# Patient Record
Sex: FEMALE | Race: WHITE | NOT HISPANIC OR LATINO | Employment: OTHER | URBAN - METROPOLITAN AREA
[De-identification: names, ages, dates, MRNs, and addresses within clinical notes are randomized per-mention and may not be internally consistent; named-entity substitution may affect disease eponyms.]

---

## 2017-02-06 ENCOUNTER — GENERIC CONVERSION - ENCOUNTER (OUTPATIENT)
Dept: OTHER | Facility: OTHER | Age: 57
End: 2017-02-06

## 2017-03-22 ENCOUNTER — ALLSCRIPTS OFFICE VISIT (OUTPATIENT)
Dept: OTHER | Facility: OTHER | Age: 57
End: 2017-03-22

## 2017-04-05 ENCOUNTER — APPOINTMENT (EMERGENCY)
Dept: RADIOLOGY | Facility: HOSPITAL | Age: 57
End: 2017-04-05
Payer: MEDICARE

## 2017-04-05 ENCOUNTER — HOSPITAL ENCOUNTER (EMERGENCY)
Facility: HOSPITAL | Age: 57
Discharge: HOME | End: 2017-04-05
Attending: EMERGENCY MEDICINE | Admitting: EMERGENCY MEDICINE
Payer: MEDICARE

## 2017-04-05 VITALS
HEART RATE: 92 BPM | WEIGHT: 155 LBS | RESPIRATION RATE: 20 BRPM | BODY MASS INDEX: 28.52 KG/M2 | DIASTOLIC BLOOD PRESSURE: 90 MMHG | SYSTOLIC BLOOD PRESSURE: 128 MMHG | OXYGEN SATURATION: 100 % | HEIGHT: 62 IN | TEMPERATURE: 98.1 F

## 2017-04-05 DIAGNOSIS — R00.2 HEART PALPITATIONS: Primary | ICD-10-CM

## 2017-04-05 LAB
ANION GAP SERPL CALCULATED.3IONS-SCNC: 10 MMOL/L (ref 4–13)
ATRIAL RATE: 103 BPM
BASOPHILS # BLD AUTO: 0.08 THOUSANDS/ΜL (ref 0–0.1)
BASOPHILS NFR BLD AUTO: 1 % (ref 0–1)
BUN SERPL-MCNC: 8 MG/DL (ref 5–25)
CALCIUM SERPL-MCNC: 9.5 MG/DL (ref 8.3–10.1)
CHLORIDE SERPL-SCNC: 101 MMOL/L (ref 100–108)
CO2 SERPL-SCNC: 27 MMOL/L (ref 21–32)
CREAT SERPL-MCNC: 0.92 MG/DL (ref 0.6–1.3)
EOSINOPHIL # BLD AUTO: 0.02 THOUSAND/ΜL (ref 0–0.61)
EOSINOPHIL NFR BLD AUTO: 0 % (ref 0–6)
ERYTHROCYTE [DISTWIDTH] IN BLOOD BY AUTOMATED COUNT: 13.8 % (ref 11.6–15.1)
GFR SERPL CREATININE-BSD FRML MDRD: >60 ML/MIN/1.73SQ M
GLUCOSE SERPL-MCNC: 134 MG/DL (ref 65–140)
HCT VFR BLD AUTO: 46.8 % (ref 34.8–46.1)
HGB BLD-MCNC: 15.5 G/DL (ref 11.5–15.4)
LYMPHOCYTES # BLD AUTO: 2.11 THOUSANDS/ΜL (ref 0.6–4.47)
LYMPHOCYTES NFR BLD AUTO: 28 % (ref 14–44)
MCH RBC QN AUTO: 28 PG (ref 26.8–34.3)
MCHC RBC AUTO-ENTMCNC: 33.1 G/DL (ref 31.4–37.4)
MCV RBC AUTO: 85 FL (ref 82–98)
MONOCYTES # BLD AUTO: 0.36 THOUSAND/ΜL (ref 0.17–1.22)
MONOCYTES NFR BLD AUTO: 5 % (ref 4–12)
NEUTROPHILS # BLD AUTO: 4.98 THOUSANDS/ΜL (ref 1.85–7.62)
NEUTS SEG NFR BLD AUTO: 66 % (ref 43–75)
NRBC BLD AUTO-RTO: 0 /100 WBCS
P AXIS: 54 DEGREES
PLATELET # BLD AUTO: 327 THOUSANDS/UL (ref 149–390)
PMV BLD AUTO: 10.6 FL (ref 8.9–12.7)
POTASSIUM SERPL-SCNC: 3.7 MMOL/L (ref 3.5–5.3)
PR INTERVAL: 136 MS
QRS AXIS: -3 DEGREES
QRSD INTERVAL: 88 MS
QT INTERVAL: 344 MS
QTC INTERVAL: 450 MS
RBC # BLD AUTO: 5.53 MILLION/UL (ref 3.81–5.12)
SODIUM SERPL-SCNC: 138 MMOL/L (ref 136–145)
SPECIMEN SOURCE: NORMAL
T WAVE AXIS: 28 DEGREES
TROPONIN I BLD-MCNC: 0 NG/ML (ref 0–0.08)
TSH SERPL DL<=0.05 MIU/L-ACNC: 1.41 UIU/ML (ref 0.36–3.74)
VENTRICULAR RATE: 103 BPM
WBC # BLD AUTO: 7.57 THOUSAND/UL (ref 4.31–10.16)

## 2017-04-05 PROCEDURE — 71020 HB CHEST X-RAY 2VW FRONTAL&LATL: CPT

## 2017-04-05 PROCEDURE — 84443 ASSAY THYROID STIM HORMONE: CPT | Performed by: NURSE PRACTITIONER

## 2017-04-05 PROCEDURE — 99284 EMERGENCY DEPT VISIT MOD MDM: CPT

## 2017-04-05 PROCEDURE — 80048 BASIC METABOLIC PNL TOTAL CA: CPT | Performed by: NURSE PRACTITIONER

## 2017-04-05 PROCEDURE — 93005 ELECTROCARDIOGRAM TRACING: CPT | Performed by: EMERGENCY MEDICINE

## 2017-04-05 PROCEDURE — 85025 COMPLETE CBC W/AUTO DIFF WBC: CPT | Performed by: NURSE PRACTITIONER

## 2017-04-05 PROCEDURE — 36415 COLL VENOUS BLD VENIPUNCTURE: CPT | Performed by: NURSE PRACTITIONER

## 2017-04-05 PROCEDURE — 84484 ASSAY OF TROPONIN QUANT: CPT

## 2017-04-05 RX ORDER — TRAZODONE HYDROCHLORIDE 150 MG/1
TABLET ORAL
COMMUNITY
End: 2018-12-04

## 2017-04-05 RX ORDER — PANTOPRAZOLE SODIUM 40 MG/1
TABLET, DELAYED RELEASE ORAL
COMMUNITY
Start: 2016-09-07 | End: 2018-12-04 | Stop reason: SDUPTHER

## 2017-04-05 RX ORDER — BIOTIN 1 MG
TABLET ORAL
COMMUNITY
Start: 2016-10-01 | End: 2018-12-04 | Stop reason: SDUPTHER

## 2017-04-05 RX ORDER — LORAZEPAM 0.5 MG/1
0.5 TABLET ORAL ONCE
Status: COMPLETED | OUTPATIENT
Start: 2017-04-05 | End: 2017-04-05

## 2017-04-05 RX ORDER — SERTRALINE HYDROCHLORIDE 100 MG/1
150 TABLET, FILM COATED ORAL 2 TIMES DAILY
COMMUNITY

## 2017-04-05 RX ORDER — LORAZEPAM 2 MG/ML
0.5 INJECTION INTRAMUSCULAR ONCE
Status: DISCONTINUED | OUTPATIENT
Start: 2017-04-05 | End: 2017-04-05

## 2017-04-05 RX ORDER — FOLIC ACID 1 MG/1
TABLET ORAL
COMMUNITY
Start: 2016-10-01 | End: 2017-03-30 | Stop reason: ALTCHOICE

## 2017-04-05 RX ORDER — CLONAZEPAM 1 MG/1
1 TABLET ORAL 4 TIMES DAILY
COMMUNITY

## 2017-04-05 RX ADMIN — LORAZEPAM 0.5 MG: 0.5 TABLET ORAL at 12:07

## 2017-04-18 ENCOUNTER — GENERIC CONVERSION - ENCOUNTER (OUTPATIENT)
Dept: OTHER | Facility: OTHER | Age: 57
End: 2017-04-18

## 2017-10-11 ENCOUNTER — GENERIC CONVERSION - ENCOUNTER (OUTPATIENT)
Dept: OTHER | Facility: OTHER | Age: 57
End: 2017-10-11

## 2018-01-10 NOTE — MISCELLANEOUS
History of Present Illness  TCM Communication [facility]: The patient is being contacted for SINDI appt not required  Hospital records were reviewed  She was hospitalized at [facility]  The date of admission: 10/5/17, date of discharge: 10/10/17  She did not schedule a follow up appointment  Communication performed and completed by      Active Problems    1  Abnormal CAT scan (793 99) (R93 8)   2  Abnormal CT of liver (793 3) (R93 2)   3  Abnormal weight loss (783 21) (R63 4)   4  Anxiety (300 00) (F41 9)   5  Backache (724 5) (M54 9)   6  Bilious vomiting with nausea (787 04) (R11 14)   7  Calculus of gallbladder without cholecystitis without obstruction (574 20) (K80 20)   8  Changing skin lesion (709 9) (L98 9)   9  Chronic obstructive pulmonary disease (496) (J44 9)   10  Chronic sore throat (472 1) (J31 2)   11  Current tobacco use (305 1) (Z72 0)   12  Decreased thyroxine (T4) level (794 5) (R94 6)   13  Dehydration (276 51) (E86 0)   14  Depression (311) (F32 9)   15  Edema (782 3) (R60 9)   16  Elevated serum alkaline phosphatase level (790 5) (R74 8)   17  Esophageal reflux (530 81) (K21 9)   18  Hemorrhoids, external (455 3) (K64 4)   19  Hepatic hemangioma (228 04) (D18 03)   20  History of gallstones (V12 79) (Z87 19)   21  Hypercholesterolemia (272 0) (E78 00)   22  Hyperlipidemia (272 4) (E78 5)   23  Kyphoscoliosis (737 30) (M41 9)   24  Liver mass (573 9) (R16 0)   25  Long term use of drug (V58 69) (Z79 899)   26  Loose stools (787 7) (R19 5)   27  Lung nodule (793 11) (R91 1)   28  Lung nodule seen on imaging study (793 11) (R91 1)   29  Multiple benign nevi (216 9) (D22 9)   30  Nasal congestion (478 19) (R09 81)   31  Need for revaccination (V05 9) (Z23)   32  Neurotic excoriations (698 4) (L98 1)   33  Noncompliance of patient with other medical treatment and regimen (V15 81) (Z91 19)   34  Obsessive compulsive disorder (300 3) (F42 9)   35  Osteoporosis (733 00) (M81 0)   36   Other gastritis without hemorrhage (535 40) (K29 60)   37  Rectal bleeding (569 3) (K62 5)   38  Rhabdomyolysis (728 88) (M62 82)   39  Schizoaffective disorder (295 70) (F25 9)   40  Screening for skin condition (V82 0) (Z13 89)   41  Sepsis (038 9,995 91) (A41 9)   42  Sprain of ankle, right (845 00) (S93 401A)   43  Urinary incontinence (788 30) (R32)    Past Medical History    1  History of Acute upper respiratory infection (465 9) (J06 9)   2  History of Benzodiazepine Abuse (305 40)   3  History of Facial cellulitis (682 0) (L03 211)   4  History of abnormal weight loss (V13 89) (Z87 898)   5  History of nicotine dependence (V15 82) (Z87 891)   6  History of Malaise (780 79) (R53 81)   7  History of Suicidal ideation (V62 84) (R45 851)    Surgical History    1  Denied: History of Recent Surgery    Family History  Mother    1  Family history of Diabetes Mellitus (V18 0)   2  Family history of Family Health Status Of Mother -    3  Family history of Stroke Syndrome (V17 1)  Father    4  Family history of Acute Myocardial Infarction (V17 3)  Brother    5  Family history of Intellectual Disabilities (V18 4)    Social History    · Denied: History of Alcohol Use (History)   · Current Every Day Smoker (305 1)   · Current tobacco use (305 1) (Z72 0)    Current Meds   1  Calcium 600 MG Oral Tablet; TAKE 1 TABLET TWICE DAILY; Therapy: 12YRZ0358 to (Evaluate:2016)  Requested for: 65GXB5317; Last   Rx:2016 Ordered   2  KlonoPIN 1 MG Oral Tablet; 1 tab TID; Therapy: (Yarelis Verduzco) to Recorded   3  Pantoprazole Sodium 40 MG Oral Tablet Delayed Release; TAKE 1 TABLET TWICE DAILY   30 MINUTES BEFORE BREAKFAST AND DINNER; Therapy: 58XZN4426 to (Evaluate:2017)  Requested for: 72DSZ6482; Last   Rx:2017 Ordered   4  Sertraline HCl - 100 MG Oral Tablet; TAKE 1 5 TABLET Daily; Last Rx:28Hzh7662   Ordered   5  Vitamin D 1000 UNIT Oral Tablet; TAKE 1 TABLET DAILY;    Therapy: 61WHI4047 to (Evaluate:2016) Requested for: 00Kgj1736; Last   Rx:[id number] Ordered   6  Vitamin D3 1000 UNIT Oral Capsule; TAKE AS DIRECTED; Therapy: 40DHV0148 to (Evaluate:31Oct2016)  Requested for: 51VYD6907; Last   Rx:01Oct2016 Ordered    Allergies    1  Penicillins   2  SEROquel TABS    Health Management  Health Maintenance   COLONOSCOPY; every 10 years; Last 07GOG1851; Next Due: 10SOZ7030;  Active    Signatures   Electronically signed by : Nae Pearce, ; Oct 11 2017  9:06AM EST                       (Author)    Electronically signed by : Armida Sinclair DO; Oct 19 2017  5:03PM EST                       (Co-author)

## 2018-01-11 NOTE — MISCELLANEOUS
History of Present Illness  TCM Communication [facility]: The patient is being contacted for follow-up after hospitalization and 1st try mess 2nd try  Hospital records were reviewed  She was hospitalized at [facility]  The date of admission: 4/10/17, date of discharge: 4/17/17  Diagnosis: schizo  She was discharged to home  Communication performed and completed by      Active Problems    1  Abnormal CAT scan (793 99) (R93 8)   2  Abnormal CT of liver (793 3) (R93 2)   3  Abnormal weight loss (783 21) (R63 4)   4  Anxiety (300 00) (F41 9)   5  Backache (724 5) (M54 9)   6  Bilious vomiting with nausea (787 04) (R11 14)   7  Calculus of gallbladder without cholecystitis without obstruction (574 20) (K80 20)   8  Changing skin lesion (709 9) (L98 9)   9  Chronic obstructive pulmonary disease (496) (J44 9)   10  Chronic sore throat (472 1) (J31 2)   11  Current tobacco use (305 1) (Z72 0)   12  Decreased thyroxine (T4) level (794 5) (R94 6)   13  Dehydration (276 51) (E86 0)   14  Depression (311) (F32 9)   15  Edema (782 3) (R60 9)   16  Elevated serum alkaline phosphatase level (790 5) (R74 8)   17  Esophageal reflux (530 81) (K21 9)   18  Hemorrhoids, external (455 3) (K64 4)   19  Hepatic hemangioma (228 04) (D18 03)   20  History of gallstones (V12 79) (Z87 19)   21  Hypercholesterolemia (272 0) (E78 00)   22  Hyperlipidemia (272 4) (E78 5)   23  Kyphoscoliosis (737 30) (M41 9)   24  Liver mass (573 9) (R16 0)   25  Long term use of drug (V58 69) (Z79 899)   26  Loose stools (787 7) (R19 5)   27  Lung nodule (793 11) (R91 1)   28  Lung nodule seen on imaging study (793 11) (R91 1)   29  Multiple benign nevi (216 9) (D22 9)   30  Nasal congestion (478 19) (R09 81)   31  Need for revaccination (V05 9) (Z23)   32  Neurotic excoriations (698 4) (L98 1)   33  Noncompliance of patient with other medical treatment and regimen (V15 81) (Z91 19)   34  Obsessive compulsive disorder (300 3) (F42 9)   35   Osteoporosis (733 00) (M81 0)   36  Other gastritis without hemorrhage (535 40) (K29 60)   37  Rectal bleeding (569 3) (K62 5)   38  Rhabdomyolysis (728 88) (M62 82)   39  Schizoaffective disorder (295 70) (F25 9)   40  Screening for skin condition (V82 0) (Z13 89)   41  Sepsis (038 9,995 91) (A41 9)   42  Sprain of ankle, right (845 00) (S93 401A)   43  Urinary incontinence (788 30) (R32)    Past Medical History    1  History of Acute upper respiratory infection (465 9) (J06 9)   2  History of Benzodiazepine Abuse (305 40)   3  History of Facial cellulitis (682 0) (L03 211)   4  History of abnormal weight loss (V13 89) (Z87 898)   5  History of nicotine dependence (V15 82) (Z87 891)   6  History of Malaise (780 79) (R53 81)   7  History of Suicidal ideation (V62 84) (R45 851)    Surgical History    1  Denied: History of Recent Surgery    Family History  Mother    1  Family history of Diabetes Mellitus (V18 0)   2  Family history of Family Health Status Of Mother -    3  Family history of Stroke Syndrome (V17 1)  Father    4  Family history of Acute Myocardial Infarction (V17 3)  Brother    5  Family history of Intellectual Disabilities (V18 4)    Social History    · Denied: History of Alcohol Use (History)   · Current Every Day Smoker (305 1)   · Current tobacco use (305 1) (Z72 0)    Current Meds   1  Calcium 600 MG Oral Tablet; TAKE 1 TABLET TWICE DAILY; Therapy: 87UVN6235 to (Evaluate:2016)  Requested for: 31GNL3526; Last   Rx:2016 Ordered   2  KlonoPIN 1 MG Oral Tablet; 1 tab TID; Therapy: (Charan Cortraymond) to Recorded   3  Pantoprazole Sodium 40 MG Oral Tablet Delayed Release; TAKE 1 TABLET TWICE DAILY   30 MINUTES BEFORE BREAKFAST AND DINNER; Therapy: 59ILO3515 to (Evaluate:2017)  Requested for: 57IPR9542; Last   Rx:2017 Ordered   4  Sertraline HCl - 100 MG Oral Tablet; TAKE 1 5 TABLET Daily; Last Rx:13Nwc0287   Ordered   5  Vitamin D 1000 UNIT Oral Tablet; TAKE 1 TABLET DAILY;    Therapy: 83WOD0855 to (Evaluate:18Jun2016)  Requested for: 42Ptv1145; Last   Rx:11Mup9862 Ordered   6  Vitamin D3 1000 UNIT Oral Capsule; TAKE AS DIRECTED; Therapy: 61HTM3620 to (Evaluate:31Oct2016)  Requested for: 21TVO3616; Last   Rx:01Oct2016 Ordered    Allergies    1  Penicillins   2  SEROquel TABS    Health Management  Health Maintenance   COLONOSCOPY; every 10 years; Last 79LPU9572; Next Due: 59DVN3607;  Active    Signatures   Electronically signed by : Roman Gold, [facility]; May  5 2017  2:04PM EST                       (Author)    Electronically signed by : Dale Nieto DO; May  5 2017  3:33PM EST                       (Co-author)

## 2018-01-13 VITALS
DIASTOLIC BLOOD PRESSURE: 80 MMHG | BODY MASS INDEX: 27.31 KG/M2 | HEIGHT: 64 IN | WEIGHT: 160 LBS | SYSTOLIC BLOOD PRESSURE: 130 MMHG

## 2018-01-14 NOTE — MISCELLANEOUS
Message  I called the patient  Last week she had refused basically all medical treatment  I called her today and she now agrees to see the pulmonologist  She is going to make an appointment for follow-up regarding her pulmonary nodules        Signatures   Electronically signed by : Nella Lane DO; Oct  3 2016  6:06PM EST                       (Author)

## 2018-01-16 NOTE — RESULT NOTES
Verified Results  (1) CBC/PLT/DIFF 15Apr2016 09:42AM Yvonne Johnson   TW Order Number: [id number]    TW Order Number: [id number]     Test Name Result Flag Reference   WBC COUNT 8 01 Thousand/uL  4 31-10 16   RBC COUNT 4 62 Million/uL  3 81-5 12   HEMOGLOBIN 12 9 g/dL  11 5-15 4   HEMATOCRIT 39 5 %  34 8-46  1   MCV 86 fL  82-98   MCH 27 9 pg  26 8-34 3   MCHC 32 7 g/dL  31 4-37 4   RDW 15 8 % H 11 6-15 1   MPV 10 5 fL  8 9-12 7   PLATELET COUNT 293 Thousands/uL  149-390   nRBC AUTOMATED 0 /100 WBCs     NEUTROPHILS RELATIVE PERCENT 59 %  43-75   LYMPHOCYTES RELATIVE PERCENT 31 %  14-44   MONOCYTES RELATIVE PERCENT 7 %  4-12   EOSINOPHILS RELATIVE PERCENT 2 %  0-6   BASOPHILS RELATIVE PERCENT 1 %  0-1   NEUTROPHILS ABSOLUTE COUNT 4 81 Thousands/µL  1 85-7 62   LYMPHOCYTES ABSOLUTE COUNT 2 48 Thousands/µL  0 60-4 47   MONOCYTES ABSOLUTE COUNT 0 52 Thousand/µL  0 17-1 22   EOSINOPHILS ABSOLUTE COUNT 0 13 Thousand/µL  0 00-0 61   BASOPHILS ABSOLUTE COUNT 0 06 Thousands/µL  0 00-0 10

## 2018-02-14 ENCOUNTER — TRANSITIONAL CARE MANAGEMENT (OUTPATIENT)
Dept: INTERNAL MEDICINE CLINIC | Facility: CLINIC | Age: 58
End: 2018-02-14

## 2018-03-07 NOTE — PROGRESS NOTES
History of Present Illness    Revaccination   Vaccine Information: Vaccine(s) Given (names): pneumovax [id number]  Unable to reach by phone  Pt called (attempt 1): [phone] sd  Pt called (attempt 2): [phone] sd  Pt called (attempt 3): [phone] sd  Other Information: patient called back and wants to call back to schedule later [phone] sd  [phone]- no response from patient  Active Problems    1  Abnormal CAT scan (793 99) (R93 8)   2  Abnormal CT of liver (793 3) (R93 2)   3  Abnormal weight loss (783 21) (R63 4)   4  Anxiety (300 00) (F41 9)   5  Backache (724 5) (M54 9)   6  Bilious vomiting with nausea (787 04) (R11 14)   7  Changing skin lesion (709 9) (L98 9)   8  Chronic obstructive pulmonary disease (496) (J44 9)   9  Chronic sore throat (472 1) (J31 2)   10  Current tobacco use (305 1) (Z72 0)   11  Decreased thyroxine (T4) level (794 5) (R94 6)   12  Dehydration (276 51) (E86 0)   13  Depression (311) (F32 9)   14  Edema (782 3) (R60 9)   15  Elevated serum alkaline phosphatase level (790 5) (R74 8)   16  Hemorrhoids, external (455 3) (K64 4)   17  History of gallstones (V12 79) (Z87 19)   18  Hypercholesterolemia (272 0) (E78 00)   19  Hyperlipidemia (272 4) (E78 5)   20  Kyphoscoliosis (737 30) (M41 9)   21  Liver mass (573 9) (R16 0)   22  Long term use of drug (V58 69) (Z79 899)   23  Loose stools (787 7) (R19 5)   24  Lung nodule (793 11) (R91 1)   25  Lung nodule seen on imaging study (793 11) (R91 1)   26  Multiple benign nevi (216 9) (D22 9)   27  Nasal congestion (478 19) (R09 81)   28  Need for revaccination (V05 9) (Z23)   29  Neurotic excoriations (698 4) (L98 1)   30  Noncompliance of patient with other medical treatment and regimen (V15 81) (Z91 19)   31  Obsessive compulsive disorder (300 3) (F42 9)   32  Osteoporosis (733 00) (M81 0)   33  Other gastritis without hemorrhage (535 40) (K29 60)   34  Rectal bleeding (569 3) (K62 5)   35   Rhabdomyolysis (446 88) (M62 82)   36  Schizoaffective disorder (295 70) (F25 9)   37  Screening for skin condition (V82 0) (Z13 89)   38  Sepsis (038 9,995 91) (A41 9)   39  Sprain of ankle, right (845 00) (S93 401A)   40  Urinary incontinence (788 30) (R32)    Immunizations  DTP/DTaP --- Edilia Abdit: 09-Apr-2015   Influenza --- Edilia Tierney: no   PPSV --- Series1: 09-Apr-2015     Current Meds   1  Calcium 600 MG Oral Tablet; TAKE 1 TABLET TWICE DAILY   2  Folic Acid 1 MG Oral Tablet; TAKE 1 TABLET DAILY   3  KlonoPIN 1 MG Oral Tablet; 1 tab TID   4  Pantoprazole Sodium 40 MG Oral Tablet Delayed Release; TAKE 1 TABLET TWICE DAILY   30 MINUTES BEFORE BREAKFAST AND DINNER   5  Sertraline HCl - 100 MG Oral Tablet; TAKE 1 5 TABLET Daily   6  Vitamin D 1000 UNIT Oral Tablet; TAKE 1 TABLET DAILY   7  Vitamin D3 1000 UNIT Oral Capsule; TAKE AS DIRECTED    Allergies    1  Penicillins   2  SEROquel TABS    Plan    1   RVAC-Pneumovax 23 25 MCG/0 5ML Injection Injectable    Future Appointments    Date/Time Provider Specialty Site   08/25/2017 09:15 AM Malik Au DO Internal Medicine [facility]     Signatures   Electronically signed by : Kaz Mancini DO; Aug 24 2017  8:00PM EST                       (Author)

## 2018-03-23 ENCOUNTER — TRANSITIONAL CARE MANAGEMENT (OUTPATIENT)
Dept: INTERNAL MEDICINE CLINIC | Facility: CLINIC | Age: 58
End: 2018-03-23

## 2018-04-04 ENCOUNTER — TRANSITIONAL CARE MANAGEMENT (OUTPATIENT)
Dept: INTERNAL MEDICINE CLINIC | Facility: CLINIC | Age: 58
End: 2018-04-04

## 2018-04-17 ENCOUNTER — APPOINTMENT (OUTPATIENT)
Dept: LAB | Facility: CLINIC | Age: 58
End: 2018-04-17
Payer: MEDICARE

## 2018-04-17 ENCOUNTER — TRANSCRIBE ORDERS (OUTPATIENT)
Dept: LAB | Facility: CLINIC | Age: 58
End: 2018-04-17

## 2018-04-17 ENCOUNTER — TELEPHONE (OUTPATIENT)
Dept: INTERNAL MEDICINE CLINIC | Facility: CLINIC | Age: 58
End: 2018-04-17

## 2018-04-17 DIAGNOSIS — Z13.21 ENCOUNTER FOR SCREENING FOR NUTRITIONAL DISORDER: Primary | ICD-10-CM

## 2018-04-17 DIAGNOSIS — Z13.21 ENCOUNTER FOR SCREENING FOR NUTRITIONAL DISORDER: ICD-10-CM

## 2018-04-17 LAB
25(OH)D3 SERPL-MCNC: 16.6 NG/ML (ref 30–100)
ALBUMIN SERPL BCP-MCNC: 3.3 G/DL (ref 3.5–5)
ALP SERPL-CCNC: 124 U/L (ref 46–116)
ALT SERPL W P-5'-P-CCNC: 9 U/L (ref 12–78)
ANION GAP SERPL CALCULATED.3IONS-SCNC: 6 MMOL/L (ref 4–13)
AST SERPL W P-5'-P-CCNC: 13 U/L (ref 5–45)
BASOPHILS # BLD AUTO: 0.04 THOUSANDS/ΜL (ref 0–0.1)
BASOPHILS NFR BLD AUTO: 0 % (ref 0–1)
BILIRUB SERPL-MCNC: 0.22 MG/DL (ref 0.2–1)
BUN SERPL-MCNC: 15 MG/DL (ref 5–25)
CALCIUM SERPL-MCNC: 9.3 MG/DL (ref 8.3–10.1)
CHLORIDE SERPL-SCNC: 110 MMOL/L (ref 100–108)
CHOLEST SERPL-MCNC: 167 MG/DL (ref 50–200)
CO2 SERPL-SCNC: 26 MMOL/L (ref 21–32)
CREAT SERPL-MCNC: 0.83 MG/DL (ref 0.6–1.3)
EOSINOPHIL # BLD AUTO: 0.25 THOUSAND/ΜL (ref 0–0.61)
EOSINOPHIL NFR BLD AUTO: 3 % (ref 0–6)
ERYTHROCYTE [DISTWIDTH] IN BLOOD BY AUTOMATED COUNT: 14.6 % (ref 11.6–15.1)
GFR SERPL CREATININE-BSD FRML MDRD: 78 ML/MIN/1.73SQ M
GLUCOSE P FAST SERPL-MCNC: 79 MG/DL (ref 65–99)
HCT VFR BLD AUTO: 36.9 % (ref 34.8–46.1)
HDLC SERPL-MCNC: 32 MG/DL (ref 40–60)
HGB BLD-MCNC: 12.1 G/DL (ref 11.5–15.4)
LDLC SERPL CALC-MCNC: 115 MG/DL (ref 0–100)
LYMPHOCYTES # BLD AUTO: 2.62 THOUSANDS/ΜL (ref 0.6–4.47)
LYMPHOCYTES NFR BLD AUTO: 29 % (ref 14–44)
MCH RBC QN AUTO: 29 PG (ref 26.8–34.3)
MCHC RBC AUTO-ENTMCNC: 32.8 G/DL (ref 31.4–37.4)
MCV RBC AUTO: 89 FL (ref 82–98)
MONOCYTES # BLD AUTO: 0.58 THOUSAND/ΜL (ref 0.17–1.22)
MONOCYTES NFR BLD AUTO: 6 % (ref 4–12)
NEUTROPHILS # BLD AUTO: 5.58 THOUSANDS/ΜL (ref 1.85–7.62)
NEUTS SEG NFR BLD AUTO: 62 % (ref 43–75)
NONHDLC SERPL-MCNC: 135 MG/DL
NRBC BLD AUTO-RTO: 0 /100 WBCS
PLATELET # BLD AUTO: 425 THOUSANDS/UL (ref 149–390)
PMV BLD AUTO: 10 FL (ref 8.9–12.7)
POTASSIUM SERPL-SCNC: 4.5 MMOL/L (ref 3.5–5.3)
PROT SERPL-MCNC: 7.7 G/DL (ref 6.4–8.2)
RBC # BLD AUTO: 4.17 MILLION/UL (ref 3.81–5.12)
SODIUM SERPL-SCNC: 142 MMOL/L (ref 136–145)
TRIGL SERPL-MCNC: 101 MG/DL
TSH SERPL DL<=0.05 MIU/L-ACNC: 2.02 UIU/ML (ref 0.36–3.74)
VIT B12 SERPL-MCNC: 231 PG/ML (ref 100–900)
WBC # BLD AUTO: 9.09 THOUSAND/UL (ref 4.31–10.16)

## 2018-04-17 PROCEDURE — 80053 COMPREHEN METABOLIC PANEL: CPT

## 2018-04-17 PROCEDURE — 84443 ASSAY THYROID STIM HORMONE: CPT

## 2018-04-17 PROCEDURE — 80061 LIPID PANEL: CPT

## 2018-04-17 PROCEDURE — 87086 URINE CULTURE/COLONY COUNT: CPT

## 2018-04-17 PROCEDURE — 82306 VITAMIN D 25 HYDROXY: CPT

## 2018-04-17 PROCEDURE — 85025 COMPLETE CBC W/AUTO DIFF WBC: CPT

## 2018-04-17 PROCEDURE — 82607 VITAMIN B-12: CPT

## 2018-04-17 PROCEDURE — 36415 COLL VENOUS BLD VENIPUNCTURE: CPT

## 2018-04-17 NOTE — TELEPHONE ENCOUNTER
The visiting nurses should try again and if there is no answer they should probably call the police

## 2018-04-18 LAB — BACTERIA UR CULT: NORMAL

## 2018-06-25 ENCOUNTER — APPOINTMENT (OUTPATIENT)
Dept: PHYSICAL THERAPY | Facility: CLINIC | Age: 58
End: 2018-06-25
Payer: MEDICARE

## 2018-06-28 ENCOUNTER — OFFICE VISIT (OUTPATIENT)
Dept: PHYSICAL THERAPY | Facility: CLINIC | Age: 58
End: 2018-06-28
Payer: MEDICARE

## 2018-06-28 DIAGNOSIS — Z96.641 STATUS POST TOTAL HIP REPLACEMENT, RIGHT: Primary | ICD-10-CM

## 2018-06-28 PROCEDURE — 97110 THERAPEUTIC EXERCISES: CPT

## 2018-06-28 NOTE — PROGRESS NOTES
Daily Note     Today's date: 2018  Patient name: Anthony Manjarrez  : 1960  MRN: [id number]  Referring provider: Smiley Miles MD  Dx:   Encounter Diagnosis     ICD-10-CM    1  Status post total hip replacement, right Z96 641    visit 20           Re-eval Date:visit 20  Date 18       Visit Count 16       FOTO        Pain In 0       Pain Out 0                  Subjective: using rw intermittently at home, is working on getting a [facility],, has no pain with amb  Objective: See treatment diary below      Assessment: Tolerated treatment well  Patient exhibited good technique with therapeutic exercises  Pt showing progress in strength, activity allyn, and gt skills  Plan: Continue per plan of care       Precautions THPs Rt LE    Specialty Daily Treatment Diary     Manual  18        def                                           Exercise Diary  18       nustep L6 10 min       Heel slides 3# 40       SL ABD w pillow b/t legs 0# 30       SAQs 3# 45       SLRs 3# 2x15       bridges 25x5 sec       LAQs 4# 30       Seated march 4# 30       Standing march, ABD EXT 3#       Leg press squats 80# 30       Leg press TRs 60# 30       Sit to stand from mat w/o UEs 20       Step ups  Up R down L 3# 30       aeromat sidestep        Calf stretch 8x15 sec       Wobble board                GT w/o asst devices                            Modalities

## 2018-07-03 ENCOUNTER — OFFICE VISIT (OUTPATIENT)
Dept: PHYSICAL THERAPY | Facility: CLINIC | Age: 58
End: 2018-07-03
Payer: MEDICARE

## 2018-07-03 DIAGNOSIS — Z96.641 STATUS POST TOTAL HIP REPLACEMENT, RIGHT: Primary | ICD-10-CM

## 2018-07-03 PROCEDURE — 97112 NEUROMUSCULAR REEDUCATION: CPT

## 2018-07-03 PROCEDURE — 97110 THERAPEUTIC EXERCISES: CPT

## 2018-07-03 NOTE — PROGRESS NOTES
Daily Note     Today's date: 7/3/2018  Patient name: Lina Luz  : 1960  MRN: [id number]  Referring provider: Ariel Bailey MD  Dx:   Encounter Diagnosis     ICD-10-CM    1  Status post total hip replacement, right Z96 641           Re-eval Date:visit 20  Date 6/28/18 7/3/18      Visit Count 16 17      FOTO        Pain In 0 0      Pain Out 0 0                Subjective: At home I walk around a lot of the time w/o the walker  Objective: See treatment diary below      Assessment: Tolerated treatment well  Pt appears safe for indep amb w/o asst devices level surfaces  Patient exhibited good technique with therapeutic exercises and would benefit from continued PT      Plan: Progress treatment as tolerated    Attempt amb on outdoor surfaces w/o asst devices      Precautions THPs Rt LE    Specialty Daily Treatment Diary     Manual  6/28/18 7/3/18       def def                                          Exercise Diary  6/28/18 7/3/18      nustep L6 10 min L6 10 min      Heel slides 3# 40 3# 45      SL ABD w pillow b/t legs 0# 30 0# 30      SAQs 3# 45 3# 45      SLRs 3# 2x15 3# 2x15      bridges 25x5 sec 30 x 5sec      LAQs 4# 30 4# 30      Seated march 4# 30 4# 30      Standing march, ABD EXT 3#  3# 30      Leg press squats 80# 30 85# 30 supine      Leg press TRs 60# 30 65# 30      Sit to stand from mat w/o UEs 20 20      Step ups  Up R down L 3# 30 3# 30      aeromat sidestep  3x12ft w/o UEs      Calf stretch 8x15 sec [id number]      Wobble board  Fwd/rev 30  Side to side 30              GT w/o asst devices  5 min total                          Modalities

## 2018-07-10 ENCOUNTER — OFFICE VISIT (OUTPATIENT)
Dept: PHYSICAL THERAPY | Facility: CLINIC | Age: 58
End: 2018-07-10
Payer: MEDICARE

## 2018-07-10 DIAGNOSIS — Z96.641 STATUS POST TOTAL HIP REPLACEMENT, RIGHT: Primary | ICD-10-CM

## 2018-07-10 PROCEDURE — 97110 THERAPEUTIC EXERCISES: CPT

## 2018-07-10 PROCEDURE — 97112 NEUROMUSCULAR REEDUCATION: CPT

## 2018-07-10 PROCEDURE — 97116 GAIT TRAINING THERAPY: CPT

## 2018-07-10 NOTE — PROGRESS NOTES
Daily Note     Today's date: 7/10/2018  Patient name: Anthony Manjarrez  : 1960  MRN: [id number]  Referring provider: Smiley Miles MD  Dx:   Encounter Diagnosis     ICD-10-CM    1  Status post total hip replacement, right Z96 641             Re-eval Date:visit 20  Date 6/28/18 7/3/18 7 10 18     Visit Count 16 17 18     FOTO        Pain In 0 0 0     Pain Out 0 0 0            Subjective: Pt states the staff at the Group home where she's living is requesting for pt to have some stair training, as she will need to go up/down about 25 steps when she returns to her previous home  Objective: See treatment diary below      Assessment: Tolerated treatment well  Patient demonstrated Indep  on stairs for ascend/descend, however she did use the B handrails      Plan: Continue per plan of care         Manual  6/28/18 7/3/18 7 10 18      def def                                          Exercise Diary  6/28/18 7/3/18 7 10 18     nustep L6 10 min L6 10 min L6  10 min     Heel slides 3# 40 3# 45 3# 45x R     SL ABD w pillow b/t legs 0# 30 0# 30 0# 40x  SL on L side     SAQs 3# 45 3# 45 3# 45x R     SLRs 3# 2x15 3# 2x15 3# 2x15 R     bridges 25x5 sec 30 x 5sec 30 x 5 sec     LAQs 4# 30 4# 30 4# 40x     Seated march 4# 30 4# 30      Standing march, ABD EXT 3#  3# 30 3# 30x ea     Leg press squats 80# 30 85# 30 supine 85# 40x     Leg press TRs 60# 30 65# 30 65# 40x     Sit to stand from mat w/o UEs 20 20 20x     Step ups  Up R down L 3# 30 3# 30 3# 30x FWD     aeromat sidestep  3x12ft w/o UEs 3x12ft w/o UE's     Calf stretch 8x15 sec [id number]     Wobble board  Fwd/rev 30  Side to side 30 Fwd/Rev 30x   Side-side 30x          * stair train  Up/down 4 steps x 7 trials  B Handrails for Asst      GT w/o asst devices  5 min total 8 min                         Modalities

## 2018-07-17 ENCOUNTER — OFFICE VISIT (OUTPATIENT)
Dept: PHYSICAL THERAPY | Facility: CLINIC | Age: 58
End: 2018-07-17
Payer: MEDICARE

## 2018-07-17 DIAGNOSIS — Z96.641 STATUS POST TOTAL HIP REPLACEMENT, RIGHT: Primary | ICD-10-CM

## 2018-07-17 PROCEDURE — 97110 THERAPEUTIC EXERCISES: CPT

## 2018-07-17 PROCEDURE — 97116 GAIT TRAINING THERAPY: CPT

## 2018-07-17 NOTE — PROGRESS NOTES
Daily Note     Today's date: 2018  Patient name: Sara Macdonald  : 1960  MRN: [id number]  Referring provider: Manisha Butterfield MD  Dx:   Encounter Diagnosis     ICD-10-CM    1  Status post total hip replacement, right Z96 641                Re-eval Date:visit 20  Date 6/28/18 7/3/18 7 10 18 18    Visit Count 16  18 19    FOTO        Pain In 0 0 0 0    Pain Out 0 0 0 0           Subjective: Pt reports she has been ambulating w/o asst devices around her home w/o difficulty w steady gait  Objective: See treatment diary below      Assessment: Tolerated treatment well  Patient exhibited good technique with therapeutic exercises and would benefit from continued PT      Plan: Potential discharge next visit  he is feeling good and has been ambulating w/o asst devices        Precautions THPs Rt LE    Manual  6/28/18 7/3/18 7 10 18 18     def def                                          Exercise Diary  6/28/18 7/3/18 7 10 18 18      nustep L6 10 min L6 10 min L6  10 min L6 10 min    Heel slides 3# 40 3# 45 3# 45x R 3# 39 R    SL ABD w pillow b/t legs 0# 30 0# 30 0# 40x  SL on L side 0# 39  Lt SL    SAQs 3# 45 3# 45 3# 45x R 3# 39    SLRs 3# 2x15 3# 2x15 3# 2x15 R 3# 2x15    bridges 25x5 sec 30 x 5sec 30 x 5 sec 30x5 sec    LAQs 4# 30 4# 30 4# 40x 4# 45    Seated march 4# 30 4# 30  4# 45    Standing march, ABD EXT 3#  3# 30 3# 30x ea 3# 40x ea    Leg press squats 80# 30 85# 30 supine 85# 40x 90#  30x    Leg press TRs 60# 30 65# 30 65# 40x 70# 30    Sit to stand from mat w/o UEs 20 20 20x 20x    Step ups  Up R down L 3# 30 3# 30 3# 30x FWD 3# 30 fwd    aeromat sidestep  3x12ft w/o UEs 3x12ft w/o UE's 5x12ft w/o UEs    Calf stretch 8x15 sec [id number] 9x15 sec    Wobble board  Fwd/rev 30  Side to side 30 Fwd/Rev 30x   Side-side 30x   Fwd/rev 30x  Side-side 30x       * stair train  Up/down 4 steps x 7 trials  B Handrails for Asst  Stair train foot over foot w 1 HR  4 steps x 7    GT w/o asst devices  5 min total 8 min     GT outdoor terrain w/o asst devices    8 min curbs, sidewalks  and uneven grassy surfaces                Modalities

## 2018-07-19 ENCOUNTER — OFFICE VISIT (OUTPATIENT)
Dept: PHYSICAL THERAPY | Facility: CLINIC | Age: 58
End: 2018-07-19
Payer: MEDICARE

## 2018-07-19 DIAGNOSIS — Z96.641 STATUS POST TOTAL HIP REPLACEMENT, RIGHT: Primary | ICD-10-CM

## 2018-07-19 PROCEDURE — 97110 THERAPEUTIC EXERCISES: CPT

## 2018-07-19 PROCEDURE — 97140 MANUAL THERAPY 1/> REGIONS: CPT

## 2018-07-19 NOTE — LETTER
2018    Rishabh Kaplan MD  [id number] [address] [address]    Patient: Eder Bates   YOB: 1960   Date of Visit: 2018     Encounter Diagnosis     ICD-10-CM    1  Status post total hip replacement, right Z96 641        Dear Dr Little Blank:    Please review the attached Plan of Care from [facility] recent visit  Please verify that you agree therapy should continue by signing the attached document and sending it back to our office  If you have any questions or concerns, please don't hesitate to call  Sincerely,    Luz Marina Bryant PT      Referring Provider:      I certify that I have read the below Plan of Care and certify the need for these services furnished under this plan of treatment while under my care  Rishabh Kaplan MD  [id number] [address] [address]  VIA Facsimile: [phone]          PT Discharge    Today's date: 2018  Patient name: Eder Bates  : 1960  MRN: [id number]  Referring provider: Howard Stokes MD  Dx: No diagnosis found  Assessment    Assessment details: Eder Bates is a 61 y o  female presenting to outpatient physical therapy with diagnosis of Status post total hip replacement, right  Patient has successfully rehabilitated her rt hip back to PLOF such that she reports no functional deficits at this time    Patient has benefitted  from skilled outpatient physical therapy to reduce pain, maximize pain free range of motion, increase strength and stability, and improve functional mobility/functional activity in order to maximize return to prior level of function with reduced limitations  Pt has demonstrated safe indep amb level surfaces, stairs and outdoor surfaces w/o asst devices  Pt is ready for D/C from PT    Thank you for your referral       Goals  All STGs and LTGs have been met at this time and pt is being D/Cd    Plan  Treatment plan discussed with: patient  Plan details: D/C PT        Subjective Evaluation    History of Present Illness  Mechanism of injury: Pt states she has returned to PLOF status  Notes no diff w any functional mobility or activities    Pain  Current pain ratin  At best pain ratin  At worst pain ratin          Objective     Active Range of Motion     Right Hip   Flexion: 110 degrees   Extension: 0 degrees   Abduction: 40 degrees   Adduction: Right hip active adduction: neutral      Strength/Myotome Testing     Right Hip   Planes of Motion   Flexion: 5  Extension: 4+  Abduction: 4+  Adduction: 5  External rotation: 4+  Internal rotation: 4+        Exercise Diary  6/28/18 7/3/18 7 10 18 7/17/18   7/19/18   nustep L6 10 min L6 10 min L6  10 min L6 10 min L6 10 min   Heel slides 3# 40 3# 45 3# 45x R 3# 39 R 3# 39 R   SL ABD w pillow b/t legs 0# 30 0# 30 0# 40x  SL on L side 0# 39  Lt SL 0# 45  L SL   SAQs 3# 45 3# 39 3# 45x R 3# 45 3# 39   SLRs 3# 2x15 3# 2x15 3# 2x15 R 3# 2x15 3# 2x15   bridges 25x5 sec 30 x 5sec 30 x 5 sec 30x5 sec [id number]   LAQs 4# 30 4# 30 4# 40x 4# 45 4# 45   Seated march 4# 30 4# 30  4# 45 4# 39   Standing march, ABD EXT 3#  3# 30 3# 30x ea 3# 40x ea 3# 40 ea   Leg press squats 80# 30 85# 30 supine 85# 40x 90#  30x 90# 40   Leg press TRs 60# 30 65# 30 65# 40x 70# 30 70# 40x   Sit to stand from mat w/o UEs 20 20 20x 20x 20x   Step ups  Up R down L 3# 30 3# 30 3# 30x FWD 3# 30 fwd 3# 30 fwd   aeromat sidestep  3x12ft w/o UEs 3x12ft w/o UE's 5x12ft w/o UEs 5x12ft w/o UEs   Calf stretch 8x15 sec [id number] 9x15 sec 9x15 sec   Wobble board  Fwd/rev 30  Side to side 30 Fwd/rev 30  Side to side 30    Fwd/rev 30  Side to side 30    Fwd/rev 30x  Side-side 30x      * stair train  Up/down 4 steps x 7 trials  B Handrails for Asst  Stair train foot over foot w 1 HR  4 steps x 7 Stair train foot over foot w 1 HR  4 steps x 7   GT w/o asst devices  5 min total 8 min     GT outdoor terrain w/o asst devices    8 min curbs, sidewalks  and uneven grassy surfaces                Modalities

## 2018-07-19 NOTE — PROGRESS NOTES
PT Discharge    Today's date: 2018  Patient name: Bienvenido Pink  : 1960  MRN: [id number]  Referring provider: Sachi Joy MD  Dx: No diagnosis found  Assessment    Assessment details: Bienvenido Pink is a 61 y o  female presenting to outpatient physical therapy with diagnosis of Status post total hip replacement, right  Patient has successfully rehabilitated her rt hip back to PLOF such that she reports no functional deficits at this time    Patient has benefitted  from skilled outpatient physical therapy to reduce pain, maximize pain free range of motion, increase strength and stability, and improve functional mobility/functional activity in order to maximize return to prior level of function with reduced limitations  Pt has demonstrated safe indep amb level surfaces, stairs and outdoor surfaces w/o asst devices  Pt is ready for D/C from PT  Thank you for your referral       Goals  All STGs and LTGs have been met at this time and pt is being D/Cd    Plan  Treatment plan discussed with: patient  Plan details: D/C PT        Subjective Evaluation    History of Present Illness  Mechanism of injury: Pt states she has returned to PLOF status  Notes no diff w any functional mobility or activities    Pain  Current pain ratin  At best pain ratin  At worst pain ratin          Objective     Active Range of Motion     Right Hip   Flexion: 110 degrees   Extension: 0 degrees   Abduction: 40 degrees   Adduction: Right hip active adduction: neutral      Strength/Myotome Testing     Right Hip   Planes of Motion   Flexion: 5  Extension: 4+  Abduction: 4+  Adduction: 5  External rotation: 4+  Internal rotation: 4+        Exercise Diary  6/28/18 7/3/18 7 10 18   nustep L6 10 min L6 10 min L6  10 min L6 10 min L6 10 min   Heel slides 3# 40 3# 45 3# 45x R 3# 39 R 3# 39 R   SL ABD w pillow b/t legs 0# 30 0# 30 0# 40x  SL on L side 0# 39  Lt SL 0# 45  L SL   SAQs 3# 39 3# 39 3# 45x R 3# 39 3# 39   SLRs 3# 2x15 3# 2x15 3# 2x15 R 3# 2x15 3# 2x15   bridges 25x5 sec 30 x 5sec 30 x 5 sec 30x5 sec [id number]   LAQs 4# 30 4# 30 4# 40x 4# 45 4# 45   Seated march 4# 30 4# 30  4# 45 4# 39   Standing march, ABD EXT 3#  3# 30 3# 30x ea 3# 40x ea 3# 40 ea   Leg press squats 80# 30 85# 30 supine 85# 40x 90#  30x 90# 40   Leg press TRs 60# 30 65# 30 65# 40x 70# 30 70# 40x   Sit to stand from mat w/o UEs 20 20 20x 20x 20x   Step ups  Up R down L 3# 30 3# 30 3# 30x FWD 3# 30 fwd 3# 30 fwd   aeromat sidestep  3x12ft w/o UEs 3x12ft w/o UE's 5x12ft w/o UEs 5x12ft w/o UEs   Calf stretch 8x15 sec [id number] 9x15 sec 9x15 sec   Wobble board  Fwd/rev 30  Side to side 30 Fwd/rev 30  Side to side 30    Fwd/rev 30  Side to side 30    Fwd/rev 30x  Side-side 30x      * stair train  Up/down 4 steps x 7 trials  B Handrails for Asst  Stair train foot over foot w 1 HR  4 steps x 7 Stair train foot over foot w 1 HR  4 steps x 7   GT w/o asst devices  5 min total 8 min     GT outdoor terrain w/o asst devices    8 min curbs, sidewalks  and uneven grassy surfaces                Modalities

## 2018-07-23 ENCOUNTER — APPOINTMENT (OUTPATIENT)
Dept: PHYSICAL THERAPY | Facility: CLINIC | Age: 58
End: 2018-07-23
Payer: MEDICARE

## 2018-07-24 ENCOUNTER — APPOINTMENT (OUTPATIENT)
Dept: PHYSICAL THERAPY | Facility: CLINIC | Age: 58
End: 2018-07-24
Payer: MEDICARE

## 2018-07-26 ENCOUNTER — APPOINTMENT (OUTPATIENT)
Dept: PHYSICAL THERAPY | Facility: CLINIC | Age: 58
End: 2018-07-26
Payer: MEDICARE

## 2018-07-27 ENCOUNTER — TRANSCRIBE ORDERS (OUTPATIENT)
Dept: PHYSICAL THERAPY | Facility: CLINIC | Age: 58
End: 2018-07-27

## 2018-07-27 DIAGNOSIS — M62.81 GENERALIZED MUSCLE WEAKNESS: ICD-10-CM

## 2018-07-27 DIAGNOSIS — R26.9 GAIT ABNORMALITY: Primary | ICD-10-CM

## 2018-09-28 ENCOUNTER — TELEPHONE (OUTPATIENT)
Dept: INTERNAL MEDICINE CLINIC | Facility: CLINIC | Age: 58
End: 2018-09-28

## 2018-09-28 NOTE — TELEPHONE ENCOUNTER
Please let Kierra Ahumada know that we have a psychology dr now; her name is Nelly  Works Tues all day and Wed half days  It's a 45 min appt for New patients  Contact Neo in our office to set up an appt    Discharge date: 9/12; had appt 9/18; No show; No TCM    FYI: Dr Rocío Kearney; refuses to come in for appointment this afternoon  Her appointment is for 2:45PM     [facility] from [address] [place] [address],[address] called  Mckenna Paul refuses to come in for appointment with Dr Rocío Kearney today  Patient was in hospital again this morning and they discharged her again- wouldn't keep her  Has psych reasons      P# for Kierra Ahumada is: [phone]

## 2018-11-27 DIAGNOSIS — K21.9 GASTROESOPHAGEAL REFLUX DISEASE, ESOPHAGITIS PRESENCE NOT SPECIFIED: Primary | ICD-10-CM

## 2018-11-27 DIAGNOSIS — E78.00 HIGH CHOLESTEROL: ICD-10-CM

## 2018-11-27 NOTE — TELEPHONE ENCOUNTER
Wesley Kamara the  at [facility]  Asking for medication refills     -Panprazole 40mg once daily in the morning  atorvastatin 10mg once at bedtime   ( I do not see this on her med list)     Pharmacy [facility] [address]    PH#[phone]

## 2018-11-29 NOTE — TELEPHONE ENCOUNTER
Wesley Kamara / Gissel Carter called back seeing if these meds will be ordered    She said patient transferred to them from [facility], where they were prescribed to her by Dr Moyer Gent     She only has enough for a couple days and is asking if these could be prescribed for her ????

## 2018-12-03 RX ORDER — ATORVASTATIN CALCIUM 10 MG/1
10 TABLET, FILM COATED ORAL DAILY
Qty: 90 TABLET | Refills: 1 | OUTPATIENT
Start: 2018-12-03

## 2018-12-03 RX ORDER — PANTOPRAZOLE SODIUM 40 MG/1
40 TABLET, DELAYED RELEASE ORAL DAILY
Qty: 90 TABLET | Refills: 1 | OUTPATIENT
Start: 2018-12-03

## 2018-12-04 ENCOUNTER — OFFICE VISIT (OUTPATIENT)
Dept: INTERNAL MEDICINE CLINIC | Facility: CLINIC | Age: 58
End: 2018-12-04
Payer: MEDICARE

## 2018-12-04 VITALS
OXYGEN SATURATION: 96 % | BODY MASS INDEX: 26.23 KG/M2 | WEIGHT: 143.4 LBS | HEART RATE: 73 BPM | DIASTOLIC BLOOD PRESSURE: 72 MMHG | SYSTOLIC BLOOD PRESSURE: 108 MMHG

## 2018-12-04 DIAGNOSIS — Z12.4 SCREENING FOR CERVICAL CANCER: ICD-10-CM

## 2018-12-04 DIAGNOSIS — E78.5 HYPERLIPIDEMIA, UNSPECIFIED HYPERLIPIDEMIA TYPE: Primary | ICD-10-CM

## 2018-12-04 DIAGNOSIS — F32.A DEPRESSION, UNSPECIFIED DEPRESSION TYPE: ICD-10-CM

## 2018-12-04 DIAGNOSIS — E55.9 VITAMIN D DEFICIENCY: ICD-10-CM

## 2018-12-04 DIAGNOSIS — Z13.6 SCREENING FOR HEART DISEASE: ICD-10-CM

## 2018-12-04 DIAGNOSIS — Z13.228 SCREENING FOR METABOLIC DISORDER: ICD-10-CM

## 2018-12-04 DIAGNOSIS — F41.9 ANXIETY: ICD-10-CM

## 2018-12-04 DIAGNOSIS — Z13.29 SCREENING FOR THYROID DISORDER: ICD-10-CM

## 2018-12-04 DIAGNOSIS — Z13.0 SCREENING FOR DEFICIENCY ANEMIA: ICD-10-CM

## 2018-12-04 DIAGNOSIS — K21.9 GASTROESOPHAGEAL REFLUX DISEASE, ESOPHAGITIS PRESENCE NOT SPECIFIED: ICD-10-CM

## 2018-12-04 PROCEDURE — 99215 OFFICE O/P EST HI 40 MIN: CPT | Performed by: PHYSICIAN ASSISTANT

## 2018-12-04 RX ORDER — ERGOCALCIFEROL 1.25 MG/1
50000 CAPSULE ORAL WEEKLY
Qty: 12 CAPSULE | Refills: 1 | Status: SHIPPED | OUTPATIENT
Start: 2018-12-04 | End: 2019-10-14 | Stop reason: SDUPTHER

## 2018-12-04 RX ORDER — TRAZODONE HYDROCHLORIDE 100 MG/1
100 TABLET ORAL
Refills: 0
Start: 2018-12-04 | End: 2021-02-10

## 2018-12-04 RX ORDER — ZIPRASIDONE HYDROCHLORIDE 20 MG/1
20 CAPSULE ORAL 2 TIMES DAILY WITH MEALS
Refills: 0
Start: 2018-12-04 | End: 2021-02-10

## 2018-12-04 RX ORDER — ATORVASTATIN CALCIUM 10 MG/1
10 TABLET, FILM COATED ORAL DAILY
Qty: 90 TABLET | Refills: 1 | Status: SHIPPED | OUTPATIENT
Start: 2018-12-04 | End: 2019-05-21 | Stop reason: SDUPTHER

## 2018-12-04 RX ORDER — HYDROXYZINE HYDROCHLORIDE 25 MG/1
25 TABLET, FILM COATED ORAL EVERY 6 HOURS PRN
Refills: 0
Start: 2018-12-04

## 2018-12-04 RX ORDER — PANTOPRAZOLE SODIUM 40 MG/1
40 TABLET, DELAYED RELEASE ORAL DAILY
Qty: 90 TABLET | Refills: 1 | Status: SHIPPED | OUTPATIENT
Start: 2018-12-04 | End: 2019-05-09 | Stop reason: SDUPTHER

## 2018-12-04 NOTE — PROGRESS NOTES
Assessment/Plan:    Hyperlipidemia-recent lipid panel in April stable, continue atorvastatin, recheck lipid panel in the spring    Vitamin-D deficiency-patient has not been taking her vitamin-D supplement, we will renew the weekly vitamin-D supplement and she will check her vitamin-D level in the spring  GERD-stable, renew pantoprazole, follow up with GI as needed    Anxiety/depression-continue to follow with Psychiatry, psych meds ordered by Psychiatry  Breast cancer screening- Recent mammogram done in May  Up-to-date    Colon cancer screening- Colonoscopy done in September of 2014, up-to-date, due in 2024    Cervical cancer screening- Referral given for gyn, patient sees Dr Jalen Abarca and will call his office to find out when she needs to be seen again  Follow-up again with Dr Jane Alvarez in the spring after blood work is done    No problem-specific Assessment & Plan notes found for this encounter  Diagnoses and all orders for this visit:    Gastroesophageal reflux disease, esophagitis presence not specified  -     pantoprazole (PROTONIX) 40 mg tablet; Take 1 tablet (40 mg total) by mouth daily    Screening for heart disease    Vitamin D deficiency  -     ergocalciferol (VITAMIN D2) 50,000 units; Take 1 capsule (50,000 Units total) by mouth once a week  -     Vitamin D 25 hydroxy; Future    Anxiety  -     ziprasidone (GEODON) 20 mg capsule; Take 1 capsule (20 mg total) by mouth 2 (two) times a day with meals  -     hydrOXYzine HCL (ATARAX) 25 mg tablet; Take 1 tablet (25 mg total) by mouth every 6 (six) hours as needed for itching    Depression, unspecified depression type  -     traZODone (DESYREL) 100 mg tablet; Take 1 tablet (100 mg total) by mouth daily at bedtime    Screening for metabolic disorder  -     Comprehensive metabolic panel; Future    Screening for thyroid disorder  -     TSH, 3rd generation with Free T4 reflex;  Future    Hyperlipidemia, unspecified hyperlipidemia type  - atorvastatin (LIPITOR) 10 mg tablet; Take 1 tablet (10 mg total) by mouth daily  -     Lipid panel; Future    Screening for deficiency anemia  -     CBC and differential; Future  -     CBC and differential; Future    Screening for cervical cancer  -     Ambulatory referral to Obstetrics / Gynecology; Future          Subjective:      Patient ID: Marjan Portillo is a 60 y o  female  Patient comes in for medication refills  She has not been here in 2 years  She lives in the [address] [place] [address]/[facility]  She is relatively noncompliant with office visits  She does not like to come in here  She does not like to do screening or follow-up tests in general     She has no acute complaints  She is present with a staff member  The following portions of the patient's history were reviewed and updated as appropriate: allergies, current medications, past family history, past medical history, past social history, past surgical history and problem list     Review of Systems   Constitutional: Negative for chills, fatigue and fever  HENT: Negative for congestion, ear pain and sore throat  Respiratory: Negative for cough and shortness of breath  Cardiovascular: Negative for chest pain and palpitations  Gastrointestinal: Negative for abdominal pain, blood in stool, diarrhea, nausea and vomiting  Objective:      /72   Pulse 73   Wt 65 kg (143 lb 6 4 oz)   SpO2 96%   BMI 26 23 kg/m²          Physical Exam   Constitutional: She appears well-developed and well-nourished  HENT:   Head: Normocephalic and atraumatic  Right Ear: External ear normal    Left Ear: External ear normal    Mouth/Throat: Oropharynx is clear and moist  No oropharyngeal exudate  Eyes: Pupils are equal, round, and reactive to light  Neck: Normal range of motion  Cardiovascular: Normal rate, regular rhythm and normal heart sounds  Pulmonary/Chest: Effort normal and breath sounds normal  No respiratory distress  Abdominal: Soft  Bowel sounds are normal  There is no tenderness  Lymphadenopathy:     She has no cervical adenopathy  Neurological: She is alert  Psychiatric: She has a normal mood and affect   Her behavior is normal  Judgment normal

## 2019-01-02 ENCOUNTER — TELEPHONE (OUTPATIENT)
Dept: INTERNAL MEDICINE CLINIC | Facility: CLINIC | Age: 59
End: 2019-01-02

## 2019-04-22 ENCOUNTER — APPOINTMENT (OUTPATIENT)
Dept: LAB | Facility: CLINIC | Age: 59
End: 2019-04-22
Payer: MEDICARE

## 2019-04-22 ENCOUNTER — OFFICE VISIT (OUTPATIENT)
Dept: INTERNAL MEDICINE CLINIC | Facility: CLINIC | Age: 59
End: 2019-04-22
Payer: MEDICARE

## 2019-04-22 VITALS
HEIGHT: 65 IN | BODY MASS INDEX: 27.16 KG/M2 | HEART RATE: 92 BPM | SYSTOLIC BLOOD PRESSURE: 110 MMHG | DIASTOLIC BLOOD PRESSURE: 74 MMHG | WEIGHT: 163 LBS | OXYGEN SATURATION: 95 %

## 2019-04-22 DIAGNOSIS — Z13.0 SCREENING FOR DEFICIENCY ANEMIA: ICD-10-CM

## 2019-04-22 DIAGNOSIS — E78.5 HYPERLIPIDEMIA, UNSPECIFIED HYPERLIPIDEMIA TYPE: ICD-10-CM

## 2019-04-22 DIAGNOSIS — Z13.228 SCREENING FOR METABOLIC DISORDER: ICD-10-CM

## 2019-04-22 DIAGNOSIS — J44.9 CHRONIC OBSTRUCTIVE PULMONARY DISEASE, UNSPECIFIED COPD TYPE (HCC): ICD-10-CM

## 2019-04-22 DIAGNOSIS — F32.A DEPRESSION, UNSPECIFIED DEPRESSION TYPE: ICD-10-CM

## 2019-04-22 DIAGNOSIS — K21.9 GASTROESOPHAGEAL REFLUX DISEASE WITHOUT ESOPHAGITIS: ICD-10-CM

## 2019-04-22 DIAGNOSIS — Z13.29 SCREENING FOR THYROID DISORDER: ICD-10-CM

## 2019-04-22 DIAGNOSIS — F41.9 ANXIETY: ICD-10-CM

## 2019-04-22 DIAGNOSIS — F25.1 SCHIZOAFFECTIVE DISORDER, DEPRESSIVE TYPE (HCC): Primary | ICD-10-CM

## 2019-04-22 DIAGNOSIS — E55.9 VITAMIN D DEFICIENCY: ICD-10-CM

## 2019-04-22 LAB
25(OH)D3 SERPL-MCNC: 90.6 NG/ML (ref 30–100)
ALBUMIN SERPL BCP-MCNC: 3.9 G/DL (ref 3.5–5)
ALP SERPL-CCNC: 104 U/L (ref 46–116)
ALT SERPL W P-5'-P-CCNC: 14 U/L (ref 12–78)
ANION GAP SERPL CALCULATED.3IONS-SCNC: 5 MMOL/L (ref 4–13)
AST SERPL W P-5'-P-CCNC: 12 U/L (ref 5–45)
BASOPHILS # BLD AUTO: 0.08 THOUSANDS/ΜL (ref 0–0.1)
BASOPHILS NFR BLD AUTO: 1 % (ref 0–1)
BILIRUB SERPL-MCNC: 0.39 MG/DL (ref 0.2–1)
BUN SERPL-MCNC: 16 MG/DL (ref 5–25)
CALCIUM SERPL-MCNC: 8.7 MG/DL (ref 8.3–10.1)
CHLORIDE SERPL-SCNC: 106 MMOL/L (ref 100–108)
CHOLEST SERPL-MCNC: 161 MG/DL (ref 50–200)
CO2 SERPL-SCNC: 25 MMOL/L (ref 21–32)
CREAT SERPL-MCNC: 1.05 MG/DL (ref 0.6–1.3)
EOSINOPHIL # BLD AUTO: 0.06 THOUSAND/ΜL (ref 0–0.61)
EOSINOPHIL NFR BLD AUTO: 1 % (ref 0–6)
ERYTHROCYTE [DISTWIDTH] IN BLOOD BY AUTOMATED COUNT: 15.6 % (ref 11.6–15.1)
GFR SERPL CREATININE-BSD FRML MDRD: 58 ML/MIN/1.73SQ M
GLUCOSE P FAST SERPL-MCNC: 87 MG/DL (ref 65–99)
HCT VFR BLD AUTO: 40.2 % (ref 34.8–46.1)
HDLC SERPL-MCNC: 32 MG/DL (ref 40–60)
HGB BLD-MCNC: 12.9 G/DL (ref 11.5–15.4)
IMM GRANULOCYTES # BLD AUTO: 0.02 THOUSAND/UL (ref 0–0.2)
IMM GRANULOCYTES NFR BLD AUTO: 0 % (ref 0–2)
LDLC SERPL CALC-MCNC: 105 MG/DL (ref 0–100)
LYMPHOCYTES # BLD AUTO: 2.61 THOUSANDS/ΜL (ref 0.6–4.47)
LYMPHOCYTES NFR BLD AUTO: 29 % (ref 14–44)
MCH RBC QN AUTO: 26.3 PG (ref 26.8–34.3)
MCHC RBC AUTO-ENTMCNC: 32.1 G/DL (ref 31.4–37.4)
MCV RBC AUTO: 82 FL (ref 82–98)
MONOCYTES # BLD AUTO: 0.46 THOUSAND/ΜL (ref 0.17–1.22)
MONOCYTES NFR BLD AUTO: 5 % (ref 4–12)
NEUTROPHILS # BLD AUTO: 5.78 THOUSANDS/ΜL (ref 1.85–7.62)
NEUTS SEG NFR BLD AUTO: 64 % (ref 43–75)
NONHDLC SERPL-MCNC: 129 MG/DL
NRBC BLD AUTO-RTO: 0 /100 WBCS
PLATELET # BLD AUTO: 338 THOUSANDS/UL (ref 149–390)
PMV BLD AUTO: 10.3 FL (ref 8.9–12.7)
POTASSIUM SERPL-SCNC: 4.1 MMOL/L (ref 3.5–5.3)
PROT SERPL-MCNC: 7.8 G/DL (ref 6.4–8.2)
RBC # BLD AUTO: 4.91 MILLION/UL (ref 3.81–5.12)
SODIUM SERPL-SCNC: 136 MMOL/L (ref 136–145)
TRIGL SERPL-MCNC: 118 MG/DL
TSH SERPL DL<=0.05 MIU/L-ACNC: 1.42 UIU/ML (ref 0.36–3.74)
WBC # BLD AUTO: 9.01 THOUSAND/UL (ref 4.31–10.16)

## 2019-04-22 PROCEDURE — 99214 OFFICE O/P EST MOD 30 MIN: CPT | Performed by: INTERNAL MEDICINE

## 2019-04-22 PROCEDURE — 36415 COLL VENOUS BLD VENIPUNCTURE: CPT

## 2019-04-22 PROCEDURE — 80061 LIPID PANEL: CPT

## 2019-04-22 PROCEDURE — G0439 PPPS, SUBSEQ VISIT: HCPCS | Performed by: INTERNAL MEDICINE

## 2019-04-22 PROCEDURE — 82306 VITAMIN D 25 HYDROXY: CPT

## 2019-04-22 PROCEDURE — 85025 COMPLETE CBC W/AUTO DIFF WBC: CPT

## 2019-04-22 PROCEDURE — 84443 ASSAY THYROID STIM HORMONE: CPT

## 2019-04-22 PROCEDURE — 80053 COMPREHEN METABOLIC PANEL: CPT

## 2019-04-23 ENCOUNTER — TELEPHONE (OUTPATIENT)
Dept: INTERNAL MEDICINE CLINIC | Facility: CLINIC | Age: 59
End: 2019-04-23

## 2019-05-02 ENCOUNTER — HOSPITAL ENCOUNTER (OUTPATIENT)
Dept: CT IMAGING | Facility: CLINIC | Age: 59
Discharge: HOME | End: 2019-05-02
Payer: COMMERCIAL

## 2019-05-02 DIAGNOSIS — J44.9 CHRONIC OBSTRUCTIVE PULMONARY DISEASE, UNSPECIFIED COPD TYPE (HCC): ICD-10-CM

## 2019-05-09 DIAGNOSIS — K21.9 GASTROESOPHAGEAL REFLUX DISEASE, ESOPHAGITIS PRESENCE NOT SPECIFIED: ICD-10-CM

## 2019-05-09 RX ORDER — PANTOPRAZOLE SODIUM 40 MG/1
TABLET, DELAYED RELEASE ORAL
Qty: 90 TABLET | Refills: 1 | Status: SHIPPED | OUTPATIENT
Start: 2019-05-09 | End: 2020-01-07 | Stop reason: SDUPTHER

## 2019-05-21 DIAGNOSIS — E78.5 HYPERLIPIDEMIA, UNSPECIFIED HYPERLIPIDEMIA TYPE: ICD-10-CM

## 2019-05-21 RX ORDER — ATORVASTATIN CALCIUM 10 MG/1
TABLET, FILM COATED ORAL
Qty: 90 TABLET | Refills: 1 | Status: SHIPPED | OUTPATIENT
Start: 2019-05-21 | End: 2019-10-24 | Stop reason: SDUPTHER

## 2019-05-23 ENCOUNTER — OFFICE VISIT (OUTPATIENT)
Dept: GASTROENTEROLOGY | Facility: CLINIC | Age: 59
End: 2019-05-23
Payer: MEDICARE

## 2019-05-23 VITALS
DIASTOLIC BLOOD PRESSURE: 70 MMHG | SYSTOLIC BLOOD PRESSURE: 107 MMHG | WEIGHT: 170 LBS | HEIGHT: 65 IN | RESPIRATION RATE: 18 BRPM | HEART RATE: 82 BPM | BODY MASS INDEX: 28.32 KG/M2

## 2019-05-23 DIAGNOSIS — K21.9 GASTROESOPHAGEAL REFLUX DISEASE WITHOUT ESOPHAGITIS: ICD-10-CM

## 2019-05-23 PROCEDURE — 99213 OFFICE O/P EST LOW 20 MIN: CPT | Performed by: INTERNAL MEDICINE

## 2019-05-24 DIAGNOSIS — K21.9 GASTROESOPHAGEAL REFLUX DISEASE, ESOPHAGITIS PRESENCE NOT SPECIFIED: Primary | ICD-10-CM

## 2019-05-24 RX ORDER — PANTOPRAZOLE SODIUM 40 MG/1
TABLET, DELAYED RELEASE ORAL
Qty: 15 TABLET | Refills: 2 | Status: SHIPPED | OUTPATIENT
Start: 2019-05-24 | End: 2019-08-29 | Stop reason: SDUPTHER

## 2019-08-29 ENCOUNTER — APPOINTMENT (OUTPATIENT)
Dept: LAB | Facility: CLINIC | Age: 59
End: 2019-08-29
Payer: MEDICARE

## 2019-08-29 ENCOUNTER — OFFICE VISIT (OUTPATIENT)
Dept: INTERNAL MEDICINE CLINIC | Facility: CLINIC | Age: 59
End: 2019-08-29
Payer: MEDICARE

## 2019-08-29 VITALS
HEART RATE: 83 BPM | BODY MASS INDEX: 28.82 KG/M2 | HEIGHT: 65 IN | OXYGEN SATURATION: 95 % | WEIGHT: 173 LBS | DIASTOLIC BLOOD PRESSURE: 84 MMHG | SYSTOLIC BLOOD PRESSURE: 132 MMHG

## 2019-08-29 DIAGNOSIS — Z00.00 HEALTHCARE MAINTENANCE: ICD-10-CM

## 2019-08-29 DIAGNOSIS — F32.A DEPRESSION, UNSPECIFIED DEPRESSION TYPE: ICD-10-CM

## 2019-08-29 DIAGNOSIS — Z12.39 SCREENING FOR BREAST CANCER: Primary | ICD-10-CM

## 2019-08-29 DIAGNOSIS — E78.5 HYPERLIPIDEMIA, UNSPECIFIED HYPERLIPIDEMIA TYPE: ICD-10-CM

## 2019-08-29 DIAGNOSIS — F25.1 SCHIZOAFFECTIVE DISORDER, DEPRESSIVE TYPE (HCC): ICD-10-CM

## 2019-08-29 DIAGNOSIS — J44.9 CHRONIC OBSTRUCTIVE PULMONARY DISEASE, UNSPECIFIED COPD TYPE (HCC): ICD-10-CM

## 2019-08-29 DIAGNOSIS — K21.9 GASTROESOPHAGEAL REFLUX DISEASE, ESOPHAGITIS PRESENCE NOT SPECIFIED: ICD-10-CM

## 2019-08-29 LAB
ALBUMIN SERPL BCP-MCNC: 4.1 G/DL (ref 3.5–5)
ALP SERPL-CCNC: 95 U/L (ref 46–116)
ALT SERPL W P-5'-P-CCNC: 14 U/L (ref 12–78)
ANION GAP SERPL CALCULATED.3IONS-SCNC: 7 MMOL/L (ref 4–13)
AST SERPL W P-5'-P-CCNC: 12 U/L (ref 5–45)
BASOPHILS # BLD AUTO: 0.08 THOUSANDS/ΜL (ref 0–0.1)
BASOPHILS NFR BLD AUTO: 1 % (ref 0–1)
BILIRUB SERPL-MCNC: 0.4 MG/DL (ref 0.2–1)
BUN SERPL-MCNC: 17 MG/DL (ref 5–25)
CALCIUM SERPL-MCNC: 9.1 MG/DL (ref 8.3–10.1)
CHLORIDE SERPL-SCNC: 105 MMOL/L (ref 100–108)
CHOLEST SERPL-MCNC: 150 MG/DL (ref 50–200)
CO2 SERPL-SCNC: 26 MMOL/L (ref 21–32)
CREAT SERPL-MCNC: 1 MG/DL (ref 0.6–1.3)
EOSINOPHIL # BLD AUTO: 0.05 THOUSAND/ΜL (ref 0–0.61)
EOSINOPHIL NFR BLD AUTO: 1 % (ref 0–6)
ERYTHROCYTE [DISTWIDTH] IN BLOOD BY AUTOMATED COUNT: 15.8 % (ref 11.6–15.1)
GFR SERPL CREATININE-BSD FRML MDRD: 61 ML/MIN/1.73SQ M
GLUCOSE P FAST SERPL-MCNC: 88 MG/DL (ref 65–99)
HCT VFR BLD AUTO: 40.7 % (ref 34.8–46.1)
HDLC SERPL-MCNC: 33 MG/DL (ref 40–60)
HGB BLD-MCNC: 12.6 G/DL (ref 11.5–15.4)
IMM GRANULOCYTES # BLD AUTO: 0.03 THOUSAND/UL (ref 0–0.2)
IMM GRANULOCYTES NFR BLD AUTO: 0 % (ref 0–2)
LDLC SERPL CALC-MCNC: 96 MG/DL (ref 0–100)
LYMPHOCYTES # BLD AUTO: 2.88 THOUSANDS/ΜL (ref 0.6–4.47)
LYMPHOCYTES NFR BLD AUTO: 27 % (ref 14–44)
MCH RBC QN AUTO: 25.5 PG (ref 26.8–34.3)
MCHC RBC AUTO-ENTMCNC: 31 G/DL (ref 31.4–37.4)
MCV RBC AUTO: 82 FL (ref 82–98)
MONOCYTES # BLD AUTO: 0.62 THOUSAND/ΜL (ref 0.17–1.22)
MONOCYTES NFR BLD AUTO: 6 % (ref 4–12)
NEUTROPHILS # BLD AUTO: 6.94 THOUSANDS/ΜL (ref 1.85–7.62)
NEUTS SEG NFR BLD AUTO: 65 % (ref 43–75)
NONHDLC SERPL-MCNC: 117 MG/DL
NRBC BLD AUTO-RTO: 0 /100 WBCS
PLATELET # BLD AUTO: 360 THOUSANDS/UL (ref 149–390)
PMV BLD AUTO: 10.4 FL (ref 8.9–12.7)
POTASSIUM SERPL-SCNC: 4 MMOL/L (ref 3.5–5.3)
PROT SERPL-MCNC: 8 G/DL (ref 6.4–8.2)
RBC # BLD AUTO: 4.94 MILLION/UL (ref 3.81–5.12)
SODIUM SERPL-SCNC: 138 MMOL/L (ref 136–145)
TRIGL SERPL-MCNC: 104 MG/DL
WBC # BLD AUTO: 10.6 THOUSAND/UL (ref 4.31–10.16)

## 2019-08-29 PROCEDURE — 99214 OFFICE O/P EST MOD 30 MIN: CPT | Performed by: INTERNAL MEDICINE

## 2019-08-29 PROCEDURE — 80061 LIPID PANEL: CPT

## 2019-08-29 PROCEDURE — 80053 COMPREHEN METABOLIC PANEL: CPT

## 2019-08-29 PROCEDURE — 36415 COLL VENOUS BLD VENIPUNCTURE: CPT

## 2019-08-29 PROCEDURE — 85025 COMPLETE CBC W/AUTO DIFF WBC: CPT

## 2019-08-29 RX ORDER — THERMOMETER, ELECTRONIC,ORAL
EACH MISCELLANEOUS 2 TIMES DAILY
COMMUNITY

## 2019-08-29 RX ORDER — DOCUSATE SODIUM 100 MG/1
100 CAPSULE, LIQUID FILLED ORAL 2 TIMES DAILY
COMMUNITY
End: 2021-07-13 | Stop reason: SDUPTHER

## 2019-08-29 NOTE — PROGRESS NOTES
Assessment/Plan:  Her psychiatric status seems stable and she is going to follow up with her psychiatrist     She does need to follow up with a mammogram labs and a pelvic exam   The importance of this was stressed to both the patient and the caregiver that accompanies her  His was supposed to be done previously but was not  I will see her back in 4 months  Before if any problems and once again I have encouraged her to discontinue her tobacco abuse  1  Screening for breast cancer  Mammo screening bilateral w 3d & cad   2  Healthcare maintenance  Ambulatory referral to Gynecology   3  Depression, unspecified depression type     4  Hyperlipidemia, unspecified hyperlipidemia type  CBC and differential    Comprehensive metabolic panel    Lipid panel   5  [facility]izoaffective disorder, depressive type ([facility] )     6  Chronic obstructive pulmonary disease, unspecified COPD type ([facility] )     7  Gastroesophageal reflux disease, esophagitis presence not specified         Orders Placed This Encounter   Procedures    Mammo screening bilateral w 3d & cad    CBC and differential    Comprehensive metabolic panel    Lipid panel    Ambulatory referral to Gynecology         Subjective:  She comes in for follow-up  When she was in last time I told her to get a mammogram as well as pelvic exam   She apparently did not have either 1 of them  She needs to have her blood work checked  She has COPD  She needs to have her labs done  Overall she says she is feeling well  She lives in a group home  She does have schizoaffective disorder  Patient ID: Tawanda Hilton is a 60 y o  female  HPI    The following portions of the patient's history were reviewed and updated as appropriate:   She has a past medical history of Abnormal weight loss, Anxiety, Benzodiazepine abuse ([facility] ), Facial cellulitis, Malaise, Nicotine dependence, Psychiatric disorder, and Suicidal ideation  ,  does not have any pertinent problems on file  , has a past surgical history that includes No past surgeries  ,  family history includes Diabetes in her mother; Heart attack in her father; Other in her brother; Stroke in her mother  ,   reports that she has been smoking  She has a 10 00 pack-year smoking history  She has never used smokeless tobacco  She reports that she does not drink alcohol or use drugs  ,  is allergic to quetiapine and penicillins       Current Outpatient Medications:     atorvastatin (LIPITOR) 10 mg tablet, TAKE 1 TABLET BY MOUTH EVERY DAY, Disp: 90 tablet, Rfl: 1    clonazePAM (KLONOPIN) 1 mg tablet, Take 1 mg by mouth 3 (three) times a day , Disp: , Rfl:     docusate sodium (COLACE) 100 mg capsule, Take 100 mg by mouth 2 (two) times a day, Disp: , Rfl:     ergocalciferol (VITAMIN D2) 50,000 units, Take 1 capsule (50,000 Units total) by mouth once a week, Disp: 12 capsule, Rfl: 1    hydrOXYzine HCL (ATARAX) 25 mg tablet, Take 1 tablet (25 mg total) by mouth every 6 (six) hours as needed for itching, Disp: , Rfl: 0    pantoprazole (PROTONIX) 40 mg tablet, TAKE 1 TABLET BY MOUTH EVERY DAY, Disp: 90 tablet, Rfl: 1    sertraline (ZOLOFT) 100 mg tablet, Take 150 mg by mouth daily , Disp: , Rfl:     tolnaftate (TINACTIN) 1 % cream, Apply topically 2 (two) times a day, Disp: , Rfl:     traZODone (DESYREL) 100 mg tablet, Take 1 tablet (100 mg total) by mouth daily at bedtime (Patient taking differently: Take 150 mg by mouth daily at bedtime ), Disp: , Rfl: 0    ziprasidone (GEODON) 20 mg capsule, Take 1 capsule (20 mg total) by mouth 2 (two) times a day with meals, Disp: , Rfl: 0    Review of Systems   Constitutional: Negative for activity change, appetite change, chills, diaphoresis, fatigue, fever and unexpected weight change  HENT: Negative for congestion, ear pain, hearing loss, mouth sores, nosebleeds, postnasal drip, sinus pressure, sinus pain, sore throat and trouble swallowing      Eyes: Negative for pain, discharge and visual disturbance  Respiratory: Positive for shortness of breath  Negative for apnea, cough, chest tightness and wheezing  She says she smokes a few cigarettes per day  Cardiovascular: Negative for chest pain, palpitations and leg swelling  Gastrointestinal: Negative for abdominal pain, anal bleeding, blood in stool, constipation, diarrhea, nausea and vomiting  Endocrine: Negative for polydipsia and polyphagia  Genitourinary: Negative for decreased urine volume, dysuria, flank pain, frequency, hematuria and urgency  Musculoskeletal: Negative for arthralgias, back pain, gait problem, joint swelling and myalgias  Skin: Negative for rash and wound  Allergic/Immunologic: Negative for environmental allergies and food allergies  Neurological: Negative for dizziness, tremors, seizures, syncope, speech difficulty, light-headedness, numbness and headaches  Hematological: Negative for adenopathy  Does not bruise/bleed easily  Psychiatric/Behavioral: Negative for agitation, confusion, hallucinations, sleep disturbance and suicidal ideas  The patient is not nervous/anxious  Objective:  /84 (BP Location: Left arm, Patient Position: Sitting, Cuff Size: Standard)   Pulse 83   Ht 5' 5" (1 651 m)   Wt 78 5 kg (173 lb)   SpO2 95%   BMI 28 79 kg/m²      Physical Exam   Constitutional: She appears well-developed and well-nourished  No distress  Blood pressure is 132/84  O2 saturations 95  Respirations are 20  Rhythm is regular  HENT:   Head: Normocephalic  Right Ear: External ear normal    Left Ear: External ear normal    Nose: Nose normal    Mouth/Throat: Oropharynx is clear and moist  No oropharyngeal exudate  Eyes: Pupils are equal, round, and reactive to light  Conjunctivae and EOM are normal  Right eye exhibits no discharge  Left eye exhibits no discharge  Neck: Normal range of motion  Neck supple  No thyromegaly present     Cardiovascular: Normal rate, regular rhythm, normal heart sounds and intact distal pulses  Exam reveals no gallop and no friction rub  No murmur heard  Pulmonary/Chest: Effort normal  No respiratory distress  She has no wheezes  She has no rales  Breath sounds are diminished  Abdominal: Soft  Bowel sounds are normal  She exhibits no distension and no mass  There is no tenderness  There is no rebound and no guarding  Musculoskeletal: Normal range of motion  She exhibits no edema, tenderness or deformity  Lymphadenopathy:     She has no cervical adenopathy  Neurological: She is alert  She has normal reflexes  She displays normal reflexes  No cranial nerve deficit  Coordination normal    Skin: Skin is warm and dry  No rash noted  No erythema  Psychiatric: Her behavior is normal  Judgment and thought content normal    She has a very bland affect  She is our doing much better than she used to be  Nursing note and vitals reviewed          Recent Results (from the past 1008 hour(s))   CBC and differential    Collection Time: 08/29/19 10:31 AM   Result Value Ref Range    WBC 10 60 (H) 4 31 - 10 16 Thousand/uL    RBC 4 94 3 81 - 5 12 Million/uL    Hemoglobin 12 6 11 5 - 15 4 g/dL    Hematocrit 40 7 34 8 - 46 1 %    MCV 82 82 - 98 fL    MCH 25 5 (L) 26 8 - 34 3 pg    MCHC 31 0 (L) 31 4 - 37 4 g/dL    RDW 15 8 (H) 11 6 - 15 1 %    MPV 10 4 8 9 - 12 7 fL    Platelets 619 508 - 532 Thousands/uL    nRBC 0 /100 WBCs    Neutrophils Relative 65 43 - 75 %    Immat GRANS % 0 0 - 2 %    Lymphocytes Relative 27 14 - 44 %    Monocytes Relative 6 4 - 12 %    Eosinophils Relative 1 0 - 6 %    Basophils Relative 1 0 - 1 %    Neutrophils Absolute 6 94 1 85 - 7 62 Thousands/µL    Immature Grans Absolute 0 03 0 00 - 0 20 Thousand/uL    Lymphocytes Absolute 2 88 0 60 - 4 47 Thousands/µL    Monocytes Absolute 0 62 0 17 - 1 22 Thousand/µL    Eosinophils Absolute 0 05 0 00 - 0 61 Thousand/µL    Basophils Absolute 0 08 0 00 - 0 10 Thousands/µL   Comprehensive metabolic panel Collection Time: 08/29/19 10:31 AM   Result Value Ref Range    Sodium 138 136 - 145 mmol/L    Potassium 4 0 3 5 - 5 3 mmol/L    Chloride 105 100 - 108 mmol/L    CO2 26 21 - 32 mmol/L    ANION GAP 7 4 - 13 mmol/L    BUN 17 5 - 25 mg/dL    Creatinine 1 00 0 60 - 1 30 mg/dL    Glucose, Fasting 88 65 - 99 mg/dL    Calcium 9 1 8 3 - 10 1 mg/dL    AST 12 5 - 45 U/L    ALT 14 12 - 78 U/L    Alkaline Phosphatase 95 46 - 116 U/L    Total Protein 8 0 6 4 - 8 2 g/dL    Albumin 4 1 3 5 - 5 0 g/dL    Total Bilirubin 0 40 0 20 - 1 00 mg/dL    eGFR 61 ml/min/1 73sq m   Lipid panel    Collection Time: 08/29/19 10:31 AM   Result Value Ref Range    Cholesterol 150 50 - 200 mg/dL    Triglycerides 104 <=150 mg/dL    HDL, Direct 33 (L) 40 - 60 mg/dL    LDL Calculated 96 0 - 100 mg/dL    Non-HDL-Chol (CHOL-HDL) 117 mg/dl

## 2019-09-06 ENCOUNTER — TELEPHONE (OUTPATIENT)
Dept: INTERNAL MEDICINE CLINIC | Facility: CLINIC | Age: 59
End: 2019-09-06

## 2019-09-06 RX ORDER — ACETAMINOPHEN 500 MG
500 TABLET ORAL EVERY 6 HOURS PRN
Qty: 30 TABLET | Refills: 0 | Status: CANCELLED | OUTPATIENT
Start: 2019-09-06

## 2019-09-06 NOTE — TELEPHONE ENCOUNTER
Mariah from [employer] called stating that at [address] [facility] last visit on 8/29 with Dr Truman Paget he mentioned about putting an order in for tylenol for her  Mariah is requesting that Dr Truman Paget put in an order for tylenol and fax it Lisa      VPN#[phone]    Any questions please contact Mariah at  [phone]

## 2019-09-10 DIAGNOSIS — R52 MILD PAIN: Primary | ICD-10-CM

## 2019-09-10 RX ORDER — ACETAMINOPHEN 500 MG
500 TABLET ORAL EVERY 6 HOURS PRN
Qty: 30 TABLET | Refills: 0 | OUTPATIENT
Start: 2019-09-10

## 2019-09-10 NOTE — TELEPHONE ENCOUNTER
CALLED [facility] AND WAS NOTIFIED MED SENT TO    TO SEND TO NE PHARMACY, STATED THEY DO NOT WANT IT TO GO THERE FAX TOP THEM -[phone] DONE

## 2019-09-23 ENCOUNTER — HOSPITAL ENCOUNTER (OUTPATIENT)
Dept: MAMMOGRAPHY | Facility: CLINIC | Age: 59
Discharge: HOME | End: 2019-09-23
Payer: MEDICARE

## 2019-09-23 VITALS — WEIGHT: 173 LBS | BODY MASS INDEX: 28.82 KG/M2 | HEIGHT: 65 IN

## 2019-09-23 DIAGNOSIS — Z12.39 SCREENING FOR BREAST CANCER: ICD-10-CM

## 2019-09-23 PROCEDURE — 77067 SCR MAMMO BI INCL CAD: CPT

## 2019-09-23 PROCEDURE — 77063 BREAST TOMOSYNTHESIS BI: CPT

## 2019-09-30 ENCOUNTER — OFFICE VISIT (OUTPATIENT)
Dept: OBGYN CLINIC | Age: 59
End: 2019-09-30
Payer: MEDICARE

## 2019-09-30 VITALS
SYSTOLIC BLOOD PRESSURE: 110 MMHG | DIASTOLIC BLOOD PRESSURE: 82 MMHG | BODY MASS INDEX: 27.61 KG/M2 | WEIGHT: 171.8 LBS | HEIGHT: 66 IN

## 2019-09-30 DIAGNOSIS — Z01.419 ENCOUNTER FOR GYNECOLOGICAL EXAMINATION WITHOUT ABNORMAL FINDING: Primary | ICD-10-CM

## 2019-09-30 DIAGNOSIS — Z78.0 POST-MENOPAUSAL: ICD-10-CM

## 2019-09-30 PROCEDURE — G0145 SCR C/V CYTO,THINLAYER,RESCR: HCPCS | Performed by: NURSE PRACTITIONER

## 2019-09-30 PROCEDURE — 87624 HPV HI-RISK TYP POOLED RSLT: CPT | Performed by: NURSE PRACTITIONER

## 2019-09-30 PROCEDURE — G0101 CA SCREEN;PELVIC/BREAST EXAM: HCPCS | Performed by: NURSE PRACTITIONER

## 2019-09-30 RX ORDER — PSEUDOEPHED/ACETAMINOPH/DIPHEN 30MG-500MG
TABLET ORAL
Refills: 5 | COMMUNITY
Start: 2019-09-11 | End: 2021-06-30 | Stop reason: ALTCHOICE

## 2019-09-30 NOTE — PROGRESS NOTES
Diagnoses and all orders for this visit:    1  Encounter for gynecological examination without abnormal finding/2  Postmenopausal  -     Liquid-based pap, screening  Pap collected today, discussed new guidelines  Healthy eating and nutrition, daily exercise discussed and SBE reinforced  Call with any issues and all questions and concerns addressed  Other orders  -     ACETAMINOPHEN EXTRA STRENGTH 500 MG tablet; TAKE 1 TABLET BY MOUTH EVERY 6 HOURS AS NEEDED FOR MILD PAIN      Discussed calcium and vitamin D daily intake  All questions and concerns answered  Call with any questions  Pleasant 60 y o  nulliparous, postmenopausal female here for new patient annual exam  She denies any vaginal bleeding of any kind  Denies vaginal itching, discharge and odor  Denies pelvic pain, breast problems and urinary incontinence  Not sexually active  She is up-to-date on mammogram, completed on 9/23/19, report not available yet to view, colonoscopy order in her chart, reprinted and given to patient today to complete  All paps in the past have been normal, last pap was in 2014, so  Based on new pap guidelines, patient will need pap today  Vitals:    09/30/19 0959   BP: 110/82   BP Location: Left arm   Patient Position: Sitting   Cuff Size: Standard   Weight: 77 9 kg (171 lb 12 8 oz)   Height: 5' 6" (1 676 m)     Body mass index is 27 73 kg/m²      Allergies   Allergen Reactions    Quetiapine     Penicillins Rash       Past Medical History:   Diagnosis Date    Abnormal weight loss     Anxiety     Benzodiazepine abuse (HCC)     Facial cellulitis     Malaise     Nicotine dependence     Psychiatric disorder     Suicidal ideation      Past Surgical History:   Procedure Laterality Date    NO PAST SURGERIES       Family History   Problem Relation Age of Onset    Diabetes Mother     Stroke Mother         Syndrome    Heart attack Father     Other Brother         Intellectual Disabilities    No Known Problems Sister     No Known Problems Maternal Grandmother     Breast cancer Paternal Grandmother 68    Breast cancer Maternal Aunt 39    No Known Problems Maternal Aunt     No Known Problems Maternal Aunt     Colon cancer Neg Hx     Ovarian cancer Neg Hx     Cervical cancer Neg Hx     Uterine cancer Neg Hx      Social History     Tobacco Use    Smoking status: Current Some Day Smoker     Packs/day: 0 50     Years: 20 00     Pack years: 10 00    Smokeless tobacco: Never Used    Tobacco comment: current every day smoker per [employer]   Substance Use Topics    Alcohol use: No    Drug use: No       Current Outpatient Medications:     ACETAMINOPHEN EXTRA STRENGTH 500 MG tablet, TAKE 1 TABLET BY MOUTH EVERY 6 HOURS AS NEEDED FOR MILD PAIN, Disp: , Rfl: 5    atorvastatin (LIPITOR) 10 mg tablet, TAKE 1 TABLET BY MOUTH EVERY DAY, Disp: 90 tablet, Rfl: 1    clonazePAM (KLONOPIN) 1 mg tablet, Take 1 mg by mouth 3 (three) times a day , Disp: , Rfl:     docusate sodium (COLACE) 100 mg capsule, Take 100 mg by mouth 2 (two) times a day, Disp: , Rfl:     ergocalciferol (VITAMIN D2) 50,000 units, Take 1 capsule (50,000 Units total) by mouth once a week, Disp: 12 capsule, Rfl: 1    hydrOXYzine HCL (ATARAX) 25 mg tablet, Take 1 tablet (25 mg total) by mouth every 6 (six) hours as needed for itching, Disp: , Rfl: 0    pantoprazole (PROTONIX) 40 mg tablet, TAKE 1 TABLET BY MOUTH EVERY DAY, Disp: 90 tablet, Rfl: 1    sertraline (ZOLOFT) 100 mg tablet, Take 150 mg by mouth daily , Disp: , Rfl:     tolnaftate (TINACTIN) 1 % cream, Apply topically 2 (two) times a day, Disp: , Rfl:     traZODone (DESYREL) 100 mg tablet, Take 1 tablet (100 mg total) by mouth daily at bedtime (Patient taking differently: Take 150 mg by mouth daily at bedtime ), Disp: , Rfl: 0    ziprasidone (GEODON) 20 mg capsule, Take 1 capsule (20 mg total) by mouth 2 (two) times a day with meals, Disp: , Rfl: 0    OB History    Para Term  AB Living       0         SAB TAB Ectopic Multiple Live Births                   She is a resident of [place], paperwork filled out today  Review of Systems   Constitutional: Negative for chills, fatigue, fever and unexpected weight change  Respiratory: Negative for shortness of breath  Gastrointestinal: Negative for anal bleeding, blood in stool, constipation and diarrhea  Genitourinary: Negative for difficulty urinating, dysuria and hematuria  OBGyn Exam     Physical Exam   Constitutional: She appears well-developed and well-nourished  No distress  Head: Normocephalic  Neck: Normal range of motion  Neck supple  Pulmonary: Effort normal   Breasts: bilateral without masses, skin changes or nipple discharge  Bilaterally soft and warm to touch  No areas of erythema or pain  Abdominal: Soft  Non-tender, obese  Pelvic exam was performed with patient supine  No labial fusion, mild thinning w/o leukoplakia  There is no rash, tenderness, lesion or injury on the right labia  There is no rash, tenderness, lesion or injury on the left labia  Uterus is not deviated, not enlarged, not fixed and not tender  Cervix exhibits no motion tenderness, no discharge and no friability, stenotic os: yes  Right adnexum displays no mass, no tenderness and no fullness  Left adnexum displays no mass, no tenderness and no fullness  No erythema or tenderness in the vagina, R/T vaginal dryness  Mild signs of atrophy, w/o erosion, shortening and/or tightening of vaginal canal  No foreign body in the vagina  No signs of injury around the vagina  No vaginal discharge found  PAP collected with some difficulty, stenosis noted  Prolapse: None  Lymphadenopathy:          Right: No inguinal adenopathy present  Left: No inguinal adenopathy present

## 2019-10-01 LAB
HPV HR 12 DNA CVX QL NAA+PROBE: NEGATIVE
HPV16 DNA CVX QL NAA+PROBE: NEGATIVE
HPV18 DNA CVX QL NAA+PROBE: NEGATIVE

## 2019-10-03 LAB
LAB AP GYN PRIMARY INTERPRETATION: NORMAL
Lab: NORMAL

## 2019-10-14 DIAGNOSIS — E55.9 VITAMIN D DEFICIENCY: ICD-10-CM

## 2019-10-14 RX ORDER — ERGOCALCIFEROL 1.25 MG/1
CAPSULE ORAL
Qty: 12 CAPSULE | Refills: 1 | Status: SHIPPED | OUTPATIENT
Start: 2019-10-14 | End: 2020-01-07 | Stop reason: SDUPTHER

## 2019-10-24 DIAGNOSIS — E78.5 HYPERLIPIDEMIA, UNSPECIFIED HYPERLIPIDEMIA TYPE: ICD-10-CM

## 2019-10-24 RX ORDER — ATORVASTATIN CALCIUM 10 MG/1
TABLET, FILM COATED ORAL
Qty: 90 TABLET | Refills: 1 | Status: SHIPPED | OUTPATIENT
Start: 2019-10-24 | End: 2020-01-07 | Stop reason: SDUPTHER

## 2020-01-07 ENCOUNTER — OFFICE VISIT (OUTPATIENT)
Dept: INTERNAL MEDICINE CLINIC | Facility: CLINIC | Age: 60
End: 2020-01-07
Payer: MEDICARE

## 2020-01-07 VITALS
HEART RATE: 82 BPM | OXYGEN SATURATION: 96 % | WEIGHT: 178.2 LBS | SYSTOLIC BLOOD PRESSURE: 108 MMHG | DIASTOLIC BLOOD PRESSURE: 74 MMHG | BODY MASS INDEX: 28.64 KG/M2 | HEIGHT: 66 IN

## 2020-01-07 DIAGNOSIS — E55.9 VITAMIN D DEFICIENCY: ICD-10-CM

## 2020-01-07 DIAGNOSIS — J44.9 CHRONIC OBSTRUCTIVE PULMONARY DISEASE, UNSPECIFIED COPD TYPE (HCC): ICD-10-CM

## 2020-01-07 DIAGNOSIS — F25.1 SCHIZOAFFECTIVE DISORDER, DEPRESSIVE TYPE (HCC): Primary | ICD-10-CM

## 2020-01-07 DIAGNOSIS — Z12.11 SCREENING FOR COLON CANCER: ICD-10-CM

## 2020-01-07 DIAGNOSIS — E78.5 HYPERLIPIDEMIA, UNSPECIFIED HYPERLIPIDEMIA TYPE: ICD-10-CM

## 2020-01-07 DIAGNOSIS — K21.9 GASTROESOPHAGEAL REFLUX DISEASE, ESOPHAGITIS PRESENCE NOT SPECIFIED: ICD-10-CM

## 2020-01-07 PROCEDURE — 99215 OFFICE O/P EST HI 40 MIN: CPT | Performed by: INTERNAL MEDICINE

## 2020-01-07 RX ORDER — PANTOPRAZOLE SODIUM 40 MG/1
40 TABLET, DELAYED RELEASE ORAL DAILY
Qty: 90 TABLET | Refills: 1 | Status: SHIPPED | OUTPATIENT
Start: 2020-01-07 | End: 2020-06-30 | Stop reason: SDUPTHER

## 2020-01-07 RX ORDER — ATORVASTATIN CALCIUM 10 MG/1
10 TABLET, FILM COATED ORAL DAILY
Qty: 90 TABLET | Refills: 1 | Status: SHIPPED | OUTPATIENT
Start: 2020-01-07 | End: 2020-01-24

## 2020-01-07 RX ORDER — ERGOCALCIFEROL 1.25 MG/1
50000 CAPSULE ORAL
Qty: 12 CAPSULE | Refills: 1 | Status: SHIPPED | OUTPATIENT
Start: 2020-01-07 | End: 2020-12-28 | Stop reason: SDUPTHER

## 2020-01-07 NOTE — PROGRESS NOTES
Assessment/Plan:  Regarding her cholesterol she will check a cholesterol  Regarding her colon issues she is going to have a cologard test     She apparently is up-to-date on mammograms and pelvic exams  I have urged her to stop smoking  Will see her in 4 months  At that time will be rescheduling a screening CT of her chest     Apparently she may be going out living on her own  Approximately 35 minute spent with patient terms of counseling physical exam and review of medications    1  Schizoaffective disorder, depressive type ([place] [address] )     2  Chronic obstructive pulmonary disease, unspecified COPD type ([place] [address] )     3  Gastroesophageal reflux disease, esophagitis presence not specified  pantoprazole (PROTONIX) 40 mg tablet    CBC and differential    Comprehensive metabolic panel   4  Hyperlipidemia, unspecified hyperlipidemia type  atorvastatin (LIPITOR) 10 mg tablet    Lipid panel   5  Vitamin D deficiency  ergocalciferol (VITAMIN D2) 50,000 units   6  Screening for colon cancer  Cologuard       Orders Placed This Encounter   Procedures    Cologuard    CBC and differential    Comprehensive metabolic panel    Lipid panel         Subjective:  She comes in for follow-up  She says she is doing well  She said her nares are under good control  She continues to be followed by Psychiatry  She continues to smoke cigarettes  Patient ID: Itz Chris is a 60 y o  female  HPI she had a cologard a couple of years ago  She is up-to-date on mammograms and pelvic exams  She does have GERD and was scoped a couple of years ago  The following portions of the patient's history were reviewed and updated as appropriate:   She has a past medical history of Abnormal weight loss, Anxiety, Benzodiazepine abuse ([place] [address] ), Facial cellulitis, Malaise, Nicotine dependence, Psychiatric disorder, and Suicidal ideation  ,  does not have any pertinent problems on file  ,   has a past surgical history that includes No past surgeries  ,  family history includes Breast cancer (age of onset: 39) in her maternal aunt; Breast cancer (age of onset: 68) in her paternal grandmother; Diabetes in her mother; Heart attack in her father; No Known Problems in her maternal aunt, maternal aunt, maternal grandmother, and sister; Other in her brother; Stroke in her mother  ,   reports that she has been smoking  She has a 10 00 pack-year smoking history  She has never used smokeless tobacco  She reports that she does not drink alcohol or use drugs  ,  is allergic to quetiapine and penicillins       Current Outpatient Medications:     ACETAMINOPHEN EXTRA STRENGTH 500 MG tablet, TAKE 1 TABLET BY MOUTH EVERY 6 HOURS AS NEEDED FOR MILD PAIN, Disp: , Rfl: 5    atorvastatin (LIPITOR) 10 mg tablet, Take 1 tablet (10 mg total) by mouth daily, Disp: 90 tablet, Rfl: 1    clonazePAM (KLONOPIN) 1 mg tablet, Take 1 mg by mouth 3 (three) times a day , Disp: , Rfl:     docusate sodium (COLACE) 100 mg capsule, Take 100 mg by mouth 2 (two) times a day, Disp: , Rfl:     ergocalciferol (VITAMIN D2) 50,000 units, Take 1 capsule (50,000 Units total) by mouth every 28 days, Disp: 12 capsule, Rfl: 1    hydrOXYzine HCL (ATARAX) 25 mg tablet, Take 1 tablet (25 mg total) by mouth every 6 (six) hours as needed for itching, Disp: , Rfl: 0    pantoprazole (PROTONIX) 40 mg tablet, Take 1 tablet (40 mg total) by mouth daily, Disp: 90 tablet, Rfl: 1    sertraline (ZOLOFT) 100 mg tablet, Take 150 mg by mouth daily , Disp: , Rfl:     tolnaftate (TINACTIN) 1 % cream, Apply topically 2 (two) times a day, Disp: , Rfl:     traZODone (DESYREL) 100 mg tablet, Take 1 tablet (100 mg total) by mouth daily at bedtime (Patient taking differently: Take 150 mg by mouth daily at bedtime ), Disp: , Rfl: 0    ziprasidone (GEODON) 20 mg capsule, Take 1 capsule (20 mg total) by mouth 2 (two) times a day with meals, Disp: , Rfl: 0    Review of Systems   Constitutional: Negative for activity change, appetite change, chills, diaphoresis, fatigue, fever and unexpected weight change  HENT: Negative for congestion, ear pain, hearing loss, mouth sores, nosebleeds, postnasal drip, sinus pressure, sinus pain, sore throat and trouble swallowing  Eyes: Negative for pain, discharge and visual disturbance  Respiratory: Negative for apnea, cough, chest tightness, shortness of breath and wheezing  Cardiovascular: Negative for chest pain, palpitations and leg swelling  Gastrointestinal: Negative for abdominal pain, anal bleeding, blood in stool, constipation, diarrhea, nausea and vomiting  GERD is under control she is due for a cologard test    Endocrine: Negative for polydipsia and polyphagia  Genitourinary: Negative for decreased urine volume, dysuria, flank pain, frequency, hematuria and urgency  She is up-to-date on pelvic exams and mammograms   Musculoskeletal: Negative for arthralgias, back pain, gait problem, joint swelling and myalgias  Skin: Negative for rash and wound  Allergic/Immunologic: Negative for environmental allergies and food allergies  Neurological: Negative for dizziness, tremors, seizures, syncope, speech difficulty, light-headedness, numbness and headaches  Hematological: Negative for adenopathy  Does not bruise/bleed easily  Psychiatric/Behavioral: Negative for agitation, confusion, hallucinations, sleep disturbance and suicidal ideas  The patient is not nervous/anxious  She carries the diagnosis of schizoaffective disorder  She is followed by Psychiatry  Objective:  /74 (BP Location: Left arm, Patient Position: Sitting, Cuff Size: Standard)   Pulse 82   Ht 5' 6" (1 676 m)   Wt 80 8 kg (178 lb 3 2 oz)   SpO2 96%   BMI 28 76 kg/m²      Physical Exam   Constitutional: She appears well-developed and well-nourished  No distress  She has a somewhat bland affect  Blood pressure is 108/74  Rhythm is regular  Rate is 20      HENT: Head: Normocephalic  Right Ear: External ear normal    Left Ear: External ear normal    Nose: Nose normal    Mouth/Throat: Oropharynx is clear and moist  No oropharyngeal exudate  Eyes: Pupils are equal, round, and reactive to light  Conjunctivae and EOM are normal  Right eye exhibits no discharge  Left eye exhibits no discharge  Neck: Normal range of motion  Neck supple  No thyromegaly present  Cardiovascular: Normal rate, regular rhythm, normal heart sounds and intact distal pulses  Exam reveals no gallop and no friction rub  No murmur heard  Pulmonary/Chest: Effort normal and breath sounds normal  No respiratory distress  She has no wheezes  She has no rales  Breath sounds are somewhat diminished  Abdominal: Soft  Bowel sounds are normal  She exhibits no distension and no mass  There is no tenderness  There is no rebound and no guarding  Musculoskeletal: Normal range of motion  She exhibits no edema, tenderness or deformity  Lymphadenopathy:     She has no cervical adenopathy  Neurological: She is alert  She has normal reflexes  She displays normal reflexes  No cranial nerve deficit  Coordination normal    Skin: Skin is warm and dry  No rash noted  No erythema  She has a healed scar across her face which is old   Psychiatric: She has a normal mood and affect  Her behavior is normal  Judgment and thought content normal    Nursing note and vitals reviewed  No results found for this or any previous visit (from the past 1008 hour(s))

## 2020-01-23 DIAGNOSIS — E78.5 HYPERLIPIDEMIA, UNSPECIFIED HYPERLIPIDEMIA TYPE: ICD-10-CM

## 2020-01-24 RX ORDER — ATORVASTATIN CALCIUM 10 MG/1
TABLET, FILM COATED ORAL
Qty: 90 TABLET | Refills: 0 | Status: SHIPPED | OUTPATIENT
Start: 2020-01-24 | End: 2020-04-28

## 2020-02-05 ENCOUNTER — TELEPHONE (OUTPATIENT)
Dept: INTERNAL MEDICINE CLINIC | Facility: CLINIC | Age: 60
End: 2020-02-05

## 2020-02-05 DIAGNOSIS — K21.9 GASTROESOPHAGEAL REFLUX DISEASE, ESOPHAGITIS PRESENCE NOT SPECIFIED: ICD-10-CM

## 2020-02-05 RX ORDER — PANTOPRAZOLE SODIUM 40 MG/1
40 TABLET, DELAYED RELEASE ORAL DAILY
Qty: 90 TABLET | Refills: 1 | Status: CANCELLED | OUTPATIENT
Start: 2020-02-05

## 2020-02-05 NOTE — TELEPHONE ENCOUNTER
NEEDS TO CLARIFY THE DIRECTION ON THE PROTONIX MEDICATION   THEY HAVE CONFLICTING DIRECTIONS ON FILE  [address] [facility]   [phone] (MCKAYLA)

## 2020-04-27 DIAGNOSIS — E78.5 HYPERLIPIDEMIA, UNSPECIFIED HYPERLIPIDEMIA TYPE: ICD-10-CM

## 2020-04-28 RX ORDER — ATORVASTATIN CALCIUM 10 MG/1
TABLET, FILM COATED ORAL
Qty: 90 TABLET | Refills: 0 | Status: SHIPPED | OUTPATIENT
Start: 2020-04-28 | End: 2020-07-31

## 2020-06-30 DIAGNOSIS — K21.9 GASTROESOPHAGEAL REFLUX DISEASE, ESOPHAGITIS PRESENCE NOT SPECIFIED: ICD-10-CM

## 2020-06-30 RX ORDER — PANTOPRAZOLE SODIUM 40 MG/1
40 TABLET, DELAYED RELEASE ORAL DAILY
Qty: 90 TABLET | Refills: 1 | Status: SHIPPED | OUTPATIENT
Start: 2020-06-30 | End: 2021-07-13 | Stop reason: SDUPTHER

## 2020-07-31 DIAGNOSIS — E78.5 HYPERLIPIDEMIA, UNSPECIFIED HYPERLIPIDEMIA TYPE: ICD-10-CM

## 2020-07-31 RX ORDER — ATORVASTATIN CALCIUM 10 MG/1
TABLET, FILM COATED ORAL
Qty: 90 TABLET | Refills: 0 | Status: SHIPPED | OUTPATIENT
Start: 2020-07-31 | End: 2020-09-01

## 2020-08-31 DIAGNOSIS — E78.5 HYPERLIPIDEMIA, UNSPECIFIED HYPERLIPIDEMIA TYPE: ICD-10-CM

## 2020-09-01 RX ORDER — ATORVASTATIN CALCIUM 10 MG/1
TABLET, FILM COATED ORAL
Qty: 90 TABLET | Refills: 0 | Status: SHIPPED | OUTPATIENT
Start: 2020-09-01 | End: 2021-01-04 | Stop reason: SDUPTHER

## 2020-09-21 NOTE — TELEPHONE ENCOUNTER
Make appointment with PA tomorrow    I have not seen her in more than a year Refill approved as requested.

## 2020-11-10 DIAGNOSIS — E55.9 VITAMIN D DEFICIENCY: ICD-10-CM

## 2020-11-10 RX ORDER — ERGOCALCIFEROL 1.25 MG/1
50000 CAPSULE ORAL
Qty: 12 CAPSULE | Refills: 1 | OUTPATIENT
Start: 2020-11-10

## 2020-11-19 ENCOUNTER — TELEPHONE (OUTPATIENT)
Dept: INTERNAL MEDICINE CLINIC | Facility: CLINIC | Age: 60
End: 2020-11-19

## 2020-11-30 ENCOUNTER — TELEPHONE (OUTPATIENT)
Dept: INTERNAL MEDICINE CLINIC | Facility: CLINIC | Age: 60
End: 2020-11-30

## 2020-12-03 ENCOUNTER — TELEPHONE (OUTPATIENT)
Dept: INTERNAL MEDICINE CLINIC | Facility: CLINIC | Age: 60
End: 2020-12-03

## 2020-12-28 DIAGNOSIS — E55.9 VITAMIN D DEFICIENCY: ICD-10-CM

## 2020-12-28 RX ORDER — ERGOCALCIFEROL 1.25 MG/1
50000 CAPSULE ORAL
Qty: 12 CAPSULE | Refills: 1 | Status: SHIPPED | OUTPATIENT
Start: 2020-12-28 | End: 2021-02-10

## 2021-01-04 DIAGNOSIS — E78.5 HYPERLIPIDEMIA, UNSPECIFIED HYPERLIPIDEMIA TYPE: ICD-10-CM

## 2021-01-04 RX ORDER — ATORVASTATIN CALCIUM 10 MG/1
10 TABLET, FILM COATED ORAL DAILY
Qty: 30 TABLET | Refills: 0 | Status: SHIPPED | OUTPATIENT
Start: 2021-01-04 | End: 2021-01-27 | Stop reason: SDUPTHER

## 2021-01-12 ENCOUNTER — TELEPHONE (OUTPATIENT)
Dept: INTERNAL MEDICINE CLINIC | Facility: CLINIC | Age: 61
End: 2021-01-12

## 2021-01-12 NOTE — TELEPHONE ENCOUNTER
[address] [facility]; [place]; [phone]  They were unable to see her today, regarding her broken arm  She wants nothing to do with them, is non compliant, paranoid        [facility]  She was found wondering the streets, wasn't seen for 3 days, according to her roomate    They reached out to her care manager alonzo saying she needs increase level of care

## 2021-01-12 NOTE — TELEPHONE ENCOUNTER
We will try to call patient and see if we can get her established with dr Cesario Mendiola Pac Maury Pac Maury Pac Please contact patient and make appointment

## 2021-01-27 DIAGNOSIS — E78.5 HYPERLIPIDEMIA, UNSPECIFIED HYPERLIPIDEMIA TYPE: ICD-10-CM

## 2021-01-27 RX ORDER — ATORVASTATIN CALCIUM 10 MG/1
10 TABLET, FILM COATED ORAL DAILY
Qty: 30 TABLET | Refills: 0 | Status: SHIPPED | OUTPATIENT
Start: 2021-01-27 | End: 2021-07-13 | Stop reason: SDUPTHER

## 2021-02-10 ENCOUNTER — OFFICE VISIT (OUTPATIENT)
Dept: INTERNAL MEDICINE CLINIC | Facility: CLINIC | Age: 61
End: 2021-02-10
Payer: MEDICARE

## 2021-02-10 ENCOUNTER — APPOINTMENT (OUTPATIENT)
Dept: LAB | Facility: CLINIC | Age: 61
End: 2021-02-10
Payer: MEDICARE

## 2021-02-10 VITALS
BODY MASS INDEX: 28.57 KG/M2 | DIASTOLIC BLOOD PRESSURE: 82 MMHG | TEMPERATURE: 96.9 F | SYSTOLIC BLOOD PRESSURE: 108 MMHG | HEART RATE: 86 BPM | WEIGHT: 177 LBS | OXYGEN SATURATION: 98 %

## 2021-02-10 DIAGNOSIS — D64.9 ANEMIA, UNSPECIFIED TYPE: ICD-10-CM

## 2021-02-10 DIAGNOSIS — E55.9 VITAMIN D DEFICIENCY: ICD-10-CM

## 2021-02-10 DIAGNOSIS — Z11.4 SCREENING FOR HIV (HUMAN IMMUNODEFICIENCY VIRUS): ICD-10-CM

## 2021-02-10 DIAGNOSIS — Z11.59 NEED FOR HEPATITIS C SCREENING TEST: ICD-10-CM

## 2021-02-10 DIAGNOSIS — R73.09 ELEVATED GLUCOSE LEVEL: ICD-10-CM

## 2021-02-10 DIAGNOSIS — J44.9 CHRONIC OBSTRUCTIVE PULMONARY DISEASE, UNSPECIFIED COPD TYPE (HCC): ICD-10-CM

## 2021-02-10 DIAGNOSIS — R74.8 ELEVATED ALKALINE PHOSPHATASE LEVEL: ICD-10-CM

## 2021-02-10 DIAGNOSIS — E78.5 HYPERLIPIDEMIA, UNSPECIFIED HYPERLIPIDEMIA TYPE: Primary | ICD-10-CM

## 2021-02-10 DIAGNOSIS — R91.1 PULMONARY NODULE: ICD-10-CM

## 2021-02-10 DIAGNOSIS — F13.20 BENZODIAZEPINE DEPENDENCE (HCC): ICD-10-CM

## 2021-02-10 DIAGNOSIS — N39.41 URGE INCONTINENCE OF URINE: ICD-10-CM

## 2021-02-10 DIAGNOSIS — F25.1 SCHIZOAFFECTIVE DISORDER, DEPRESSIVE TYPE (HCC): ICD-10-CM

## 2021-02-10 LAB
25(OH)D3 SERPL-MCNC: 38.3 NG/ML (ref 30–100)
ALBUMIN SERPL BCP-MCNC: 4 G/DL (ref 3.5–5)
ALP SERPL-CCNC: 106 U/L (ref 46–116)
ALT SERPL W P-5'-P-CCNC: 15 U/L (ref 12–78)
ANION GAP SERPL CALCULATED.3IONS-SCNC: 4 MMOL/L (ref 4–13)
AST SERPL W P-5'-P-CCNC: 9 U/L (ref 5–45)
BASOPHILS # BLD AUTO: 0.08 THOUSANDS/ΜL (ref 0–0.1)
BASOPHILS NFR BLD AUTO: 1 % (ref 0–1)
BILIRUB SERPL-MCNC: 0.29 MG/DL (ref 0.2–1)
BUN SERPL-MCNC: 18 MG/DL (ref 5–25)
CALCIUM SERPL-MCNC: 9.7 MG/DL (ref 8.3–10.1)
CHLORIDE SERPL-SCNC: 109 MMOL/L (ref 100–108)
CO2 SERPL-SCNC: 27 MMOL/L (ref 21–32)
CREAT SERPL-MCNC: 0.88 MG/DL (ref 0.6–1.3)
EOSINOPHIL # BLD AUTO: 0.22 THOUSAND/ΜL (ref 0–0.61)
EOSINOPHIL NFR BLD AUTO: 3 % (ref 0–6)
ERYTHROCYTE [DISTWIDTH] IN BLOOD BY AUTOMATED COUNT: 19.9 % (ref 11.6–15.1)
EST. AVERAGE GLUCOSE BLD GHB EST-MCNC: 108 MG/DL
GFR SERPL CREATININE-BSD FRML MDRD: 71 ML/MIN/1.73SQ M
GLUCOSE P FAST SERPL-MCNC: 95 MG/DL (ref 65–99)
HBA1C MFR BLD: 5.4 %
HCT VFR BLD AUTO: 33.4 % (ref 34.8–46.1)
HCV AB SER QL: NORMAL
HGB BLD-MCNC: 10.1 G/DL (ref 11.5–15.4)
IMM GRANULOCYTES # BLD AUTO: 0.02 THOUSAND/UL (ref 0–0.2)
IMM GRANULOCYTES NFR BLD AUTO: 0 % (ref 0–2)
LYMPHOCYTES # BLD AUTO: 2.3 THOUSANDS/ΜL (ref 0.6–4.47)
LYMPHOCYTES NFR BLD AUTO: 29 % (ref 14–44)
MCH RBC QN AUTO: 24.6 PG (ref 26.8–34.3)
MCHC RBC AUTO-ENTMCNC: 30.2 G/DL (ref 31.4–37.4)
MCV RBC AUTO: 81 FL (ref 82–98)
MONOCYTES # BLD AUTO: 0.53 THOUSAND/ΜL (ref 0.17–1.22)
MONOCYTES NFR BLD AUTO: 7 % (ref 4–12)
NEUTROPHILS # BLD AUTO: 4.92 THOUSANDS/ΜL (ref 1.85–7.62)
NEUTS SEG NFR BLD AUTO: 60 % (ref 43–75)
NRBC BLD AUTO-RTO: 0 /100 WBCS
PLATELET # BLD AUTO: 391 THOUSANDS/UL (ref 149–390)
PMV BLD AUTO: 10.6 FL (ref 8.9–12.7)
POTASSIUM SERPL-SCNC: 4.3 MMOL/L (ref 3.5–5.3)
PROT SERPL-MCNC: 7.8 G/DL (ref 6.4–8.2)
RBC # BLD AUTO: 4.11 MILLION/UL (ref 3.81–5.12)
SODIUM SERPL-SCNC: 140 MMOL/L (ref 136–145)
WBC # BLD AUTO: 8.07 THOUSAND/UL (ref 4.31–10.16)

## 2021-02-10 PROCEDURE — 87389 HIV-1 AG W/HIV-1&-2 AB AG IA: CPT

## 2021-02-10 PROCEDURE — 99214 OFFICE O/P EST MOD 30 MIN: CPT | Performed by: FAMILY MEDICINE

## 2021-02-10 PROCEDURE — 80053 COMPREHEN METABOLIC PANEL: CPT

## 2021-02-10 PROCEDURE — 86803 HEPATITIS C AB TEST: CPT

## 2021-02-10 PROCEDURE — 85025 COMPLETE CBC W/AUTO DIFF WBC: CPT

## 2021-02-10 PROCEDURE — 82306 VITAMIN D 25 HYDROXY: CPT

## 2021-02-10 PROCEDURE — 83036 HEMOGLOBIN GLYCOSYLATED A1C: CPT

## 2021-02-10 PROCEDURE — 36415 COLL VENOUS BLD VENIPUNCTURE: CPT

## 2021-02-10 RX ORDER — OXYBUTYNIN CHLORIDE 5 MG/1
TABLET ORAL
COMMUNITY
Start: 2021-01-25 | End: 2021-07-13 | Stop reason: SDUPTHER

## 2021-02-10 RX ORDER — ZIPRASIDONE HYDROCHLORIDE 40 MG/1
80 CAPSULE ORAL 2 TIMES DAILY
COMMUNITY
Start: 2021-01-25

## 2021-02-10 RX ORDER — LANOLIN ALCOHOL/MO/W.PET/CERES
6 CREAM (GRAM) TOPICAL
COMMUNITY
Start: 2020-11-11

## 2021-02-10 RX ORDER — TRAZODONE HYDROCHLORIDE 150 MG/1
150 TABLET ORAL
COMMUNITY
Start: 2021-01-25

## 2021-02-10 RX ORDER — ASPIRIN 81 MG/1
81 TABLET, CHEWABLE ORAL DAILY
COMMUNITY
Start: 2020-11-11 | End: 2021-07-13 | Stop reason: SDUPTHER

## 2021-02-10 RX ORDER — LANOLIN ALCOHOL/MO/W.PET/CERES
400 CREAM (GRAM) TOPICAL 2 TIMES DAILY
COMMUNITY
Start: 2021-01-06 | End: 2021-07-13 | Stop reason: SDUPTHER

## 2021-02-10 NOTE — PROGRESS NOTES
FOLLOW-UP OFFICE VISIT  [facility]    NAME: Naida Clay  AGE: 63 y o  SEX: female  : 1960     DATE: 2/10/2021     Assessment and Plan:     1  Hyperlipidemia, unspecified hyperlipidemia type-continue statin     2  Anemia, unspecified type- h/h of 2021 show hemoglobin of 11 and hematocrit of 33  repeat study ordered  - CBC and differential; Future    3  Elevated glucose level-multiple studies shows elevation  F/u a1c ordered  - HEMOGLOBIN A1C W/ EAG ESTIMATION; Future    4  Elevated alkaline phosphatase level- last alk phos 122  Repeat study ordered  - Comprehensive metabolic panel; Future    5  Schizoaffective disorder, depressive type ([facility] )  Stable  Living in group setting  No longer independent  Pt to continue with psych  6  Urge incontinence of urine-continue oxybutinin  Script for adult diaper faxed to pharmacy     7  Need for hepatitis C screening test  - Hepatitis C Antibody ([facility]); Future    8  Screening for HIV (human immunodeficiency virus)  - HIV 1/2 Antigen/Antibody (4th Generation) w Reflex [facility]; Future    9  Pulmonary nodule  - CT lung nodule follow-up; Future    10  Vitamin D deficiency- not currently taking supplement  Will check serum levels   - Vitamin D 25 hydroxy; Future    11  Chronic obstructive pulmonary disease, unspecified COPD type ([facility] )  Stable  12  Benzodiazepine dependence ([facility] )- persistent  on klonopin for anxiety and panic  Managed by psychiatry  Return in about 3 months (around 5/10/2021)  Chief Complaint:     Chief Complaint   Patient presents with    Follow-up     panic attacks        History of Present Illness:       34-year-old female past medical history significant for hyperlipidemia, anxiety, schizoaffective disorder, COPD and pulmonary nodules  Patient presents to establish care  Previous PCP retired over 4 months ago    Patient last seen previous PCP over a year ago         Today patient complains of urinary incontinence  Occurs when she has the urge to go and is unable to get to the bathroom in time  Denies urinary leakage with coughing, laughing or quick positional changes  She is on oxybutynin therapy for this but still has problems  Asking for a prescription of adult depends  Patient has history of Closed fracture of proximal end of right fibula  A  m  Nondisplaced fracture of distal phalanx of right thumb  Fractures  Were due to a fall that occurred a few months ago  Says she wore a cast and her broken bones have since healed  She did not participate in physical therapy  Home PT was ordered via [facility] however she reports that no one ever came to her home  This provided knows that multiple attempts were made to reach patient and conduct home PT however due to her psychiatric illness she would not let nursing staff in her home at that time  The patient [facility]  Since she has  moved from independent living to being back in a group community living with staff ( [facility])  Has been there for a week now  Denies SI or self injurious behavior  Follows with psychiatrist every 3 months  Has a  she speaks with weekly  Still smoking  Half a pack a day  Trying to self wean  Review of Systems:     Review of Systems   Eyes: Negative for visual disturbance  Respiratory: Negative for cough and shortness of breath  Cardiovascular: Negative for chest pain and leg swelling  Gastrointestinal: Positive for constipation (on colace)  Negative for abdominal pain and blood in stool  Genitourinary: Negative for difficulty urinating, dysuria and hematuria  Neurological: Positive for tremors (full body shakes ) and headaches (responds to tylenol )  Psychiatric/Behavioral: Negative for self-injury, sleep disturbance and suicidal ideas  The patient is nervous/anxious  Problem List:     Patient Active Problem List   Diagnosis    Anxiety    Depression    Hyperlipidemia    Esophageal reflux    [facility]    Chronic obstructive pulmonary disease ([facility] )    Encounter for gynecological examination without abnormal finding    Post-menopausal    [facility]        Objective:     /82   Pulse 86   Temp (!) 96 9 °F (36 1 °C) (Tympanic)   Wt 80 3 kg (177 lb)   SpO2 98%   BMI 28 57 kg/m²     Physical Exam  Constitutional:       General: She is not in acute distress  HENT:      Head: Normocephalic and atraumatic  Right Ear: Tympanic membrane and external ear normal       Left Ear: Tympanic membrane and external ear normal    Eyes:      Conjunctiva/sclera: Conjunctivae normal       Pupils: Pupils are equal, round, and reactive to light  Cardiovascular:      Rate and Rhythm: Normal rate and regular rhythm  Pulmonary:      Effort: Pulmonary effort is normal       Breath sounds: No stridor  Abdominal:      General: Bowel sounds are normal       Palpations: Abdomen is soft  Tenderness: There is no abdominal tenderness  Musculoskeletal:      Right lower leg: No edema  Left lower leg: No edema  Neurological:      Mental Status: She is alert        Gait: Gait normal       Comments:  tardive dyskinesia of the jaw         Pertinent Laboratory/Diagnostic Studies:    Laboratory Results: I have personally reviewed the pertinent laboratory results/reports             Main Salomon [facility]  2/10/2021 12:49 PM

## 2021-02-10 NOTE — PATIENT INSTRUCTIONS
Get your blood work done before your next visit  Make sure you schedule the lung cat scan for early May

## 2021-02-11 LAB — HIV 1+2 AB+HIV1 P24 AG SERPL QL IA: NORMAL

## 2021-02-12 ENCOUNTER — TELEPHONE (OUTPATIENT)
Dept: INTERNAL MEDICINE CLINIC | Facility: CLINIC | Age: 61
End: 2021-02-12

## 2021-02-12 NOTE — TELEPHONE ENCOUNTER
----- Message from Fletcher Wagner DO sent at 2/12/2021 11:23 AM EST -----  Mild anemia will continue to monitor, vit d level normal  negative hep c and hiv    Will have [facility] review negative study with pt

## 2021-02-12 NOTE — TELEPHONE ENCOUNTER
Tyler Irenelaanayeli / sister called back and sd to call patient @ [phone]  that is her home #  sister sd she has nothing to do with her sister Royal Para

## 2021-04-30 ENCOUNTER — HOSPITAL ENCOUNTER (OUTPATIENT)
Dept: CT IMAGING | Facility: CLINIC | Age: 61
Discharge: HOME | End: 2021-04-30
Payer: MEDICARE

## 2021-04-30 DIAGNOSIS — R91.1 PULMONARY NODULE: ICD-10-CM

## 2021-04-30 PROCEDURE — G1004 CDSM NDSC: HCPCS

## 2021-04-30 PROCEDURE — 71250 CT THORAX DX C-: CPT

## 2021-05-04 ENCOUNTER — TELEPHONE (OUTPATIENT)
Dept: INTERNAL MEDICINE CLINIC | Facility: CLINIC | Age: 61
End: 2021-05-04

## 2021-05-04 NOTE — TELEPHONE ENCOUNTER
----- Message from Joshua Perez DO sent at 5/4/2021  2:52 PM EDT -----  Stable, benign appearing  lung nodules   Recommend repeat study in 6-12 months

## 2021-05-17 ENCOUNTER — TELEPHONE (OUTPATIENT)
Dept: INTERNAL MEDICINE CLINIC | Facility: CLINIC | Age: 61
End: 2021-05-17

## 2021-05-17 ENCOUNTER — APPOINTMENT (OUTPATIENT)
Dept: LAB | Facility: CLINIC | Age: 61
End: 2021-05-17
Payer: MEDICARE

## 2021-05-17 ENCOUNTER — OFFICE VISIT (OUTPATIENT)
Dept: INTERNAL MEDICINE CLINIC | Facility: CLINIC | Age: 61
End: 2021-05-17
Payer: MEDICARE

## 2021-05-17 VITALS
TEMPERATURE: 97.7 F | BODY MASS INDEX: 25.57 KG/M2 | OXYGEN SATURATION: 98 % | DIASTOLIC BLOOD PRESSURE: 84 MMHG | HEART RATE: 67 BPM | SYSTOLIC BLOOD PRESSURE: 116 MMHG | WEIGHT: 158.4 LBS

## 2021-05-17 DIAGNOSIS — R26.89 SHUFFLING GAIT: ICD-10-CM

## 2021-05-17 DIAGNOSIS — D64.9 ANEMIA, UNSPECIFIED TYPE: ICD-10-CM

## 2021-05-17 DIAGNOSIS — R25.1 TREMOR: ICD-10-CM

## 2021-05-17 DIAGNOSIS — F41.9 ANXIETY: Primary | ICD-10-CM

## 2021-05-17 DIAGNOSIS — R63.4 WEIGHT LOSS: ICD-10-CM

## 2021-05-17 DIAGNOSIS — R32 URINARY INCONTINENCE, UNSPECIFIED TYPE: ICD-10-CM

## 2021-05-17 DIAGNOSIS — D50.8 IRON DEFICIENCY ANEMIA SECONDARY TO INADEQUATE DIETARY IRON INTAKE: Primary | ICD-10-CM

## 2021-05-17 DIAGNOSIS — R39.198 DIFFICULTY URINATING: ICD-10-CM

## 2021-05-17 LAB
BASOPHILS # BLD AUTO: 0.06 THOUSANDS/ΜL (ref 0–0.1)
BASOPHILS NFR BLD AUTO: 1 % (ref 0–1)
BILIRUB UR QL STRIP: NEGATIVE
CLARITY UR: ABNORMAL
COLOR UR: YELLOW
EOSINOPHIL # BLD AUTO: 0.43 THOUSAND/ΜL (ref 0–0.61)
EOSINOPHIL NFR BLD AUTO: 5 % (ref 0–6)
ERYTHROCYTE [DISTWIDTH] IN BLOOD BY AUTOMATED COUNT: 18.3 % (ref 11.6–15.1)
FERRITIN SERPL-MCNC: 6 NG/ML (ref 8–388)
GLUCOSE UR STRIP-MCNC: NEGATIVE MG/DL
HCT VFR BLD AUTO: 34.2 % (ref 34.8–46.1)
HGB BLD-MCNC: 10.3 G/DL (ref 11.5–15.4)
HGB UR QL STRIP.AUTO: NEGATIVE
IMM GRANULOCYTES # BLD AUTO: 0.02 THOUSAND/UL (ref 0–0.2)
IMM GRANULOCYTES NFR BLD AUTO: 0 % (ref 0–2)
IRON SATN MFR SERPL: 6 %
IRON SERPL-MCNC: 24 UG/DL (ref 50–170)
KETONES UR STRIP-MCNC: NEGATIVE MG/DL
LEUKOCYTE ESTERASE UR QL STRIP: ABNORMAL
LYMPHOCYTES # BLD AUTO: 2.24 THOUSANDS/ΜL (ref 0.6–4.47)
LYMPHOCYTES NFR BLD AUTO: 27 % (ref 14–44)
MCH RBC QN AUTO: 22.2 PG (ref 26.8–34.3)
MCHC RBC AUTO-ENTMCNC: 30.1 G/DL (ref 31.4–37.4)
MCV RBC AUTO: 74 FL (ref 82–98)
MONOCYTES # BLD AUTO: 0.45 THOUSAND/ΜL (ref 0.17–1.22)
MONOCYTES NFR BLD AUTO: 5 % (ref 4–12)
NEUTROPHILS # BLD AUTO: 5.14 THOUSANDS/ΜL (ref 1.85–7.62)
NEUTS SEG NFR BLD AUTO: 62 % (ref 43–75)
NITRITE UR QL STRIP: NEGATIVE
NRBC BLD AUTO-RTO: 0 /100 WBCS
PH UR STRIP.AUTO: 7.5 [PH]
PLATELET # BLD AUTO: 368 THOUSANDS/UL (ref 149–390)
PMV BLD AUTO: 11.1 FL (ref 8.9–12.7)
PROT UR STRIP-MCNC: NEGATIVE MG/DL
RBC # BLD AUTO: 4.65 MILLION/UL (ref 3.81–5.12)
SP GR UR STRIP.AUTO: 1.01 (ref 1–1.03)
TIBC SERPL-MCNC: 400 UG/DL (ref 250–450)
TSH SERPL DL<=0.05 MIU/L-ACNC: 1.39 UIU/ML (ref 0.36–3.74)
UROBILINOGEN UR QL STRIP.AUTO: 0.2 E.U./DL
WBC # BLD AUTO: 8.34 THOUSAND/UL (ref 4.31–10.16)

## 2021-05-17 PROCEDURE — 83550 IRON BINDING TEST: CPT

## 2021-05-17 PROCEDURE — 83540 ASSAY OF IRON: CPT

## 2021-05-17 PROCEDURE — 85025 COMPLETE CBC W/AUTO DIFF WBC: CPT

## 2021-05-17 PROCEDURE — 84443 ASSAY THYROID STIM HORMONE: CPT

## 2021-05-17 PROCEDURE — 99214 OFFICE O/P EST MOD 30 MIN: CPT | Performed by: FAMILY MEDICINE

## 2021-05-17 PROCEDURE — 82728 ASSAY OF FERRITIN: CPT

## 2021-05-17 PROCEDURE — 81001 URINALYSIS AUTO W/SCOPE: CPT | Performed by: FAMILY MEDICINE

## 2021-05-17 PROCEDURE — 36415 COLL VENOUS BLD VENIPUNCTURE: CPT

## 2021-05-17 RX ORDER — FERROUS SULFATE TAB EC 324 MG (65 MG FE EQUIVALENT) 324 (65 FE) MG
324 TABLET DELAYED RESPONSE ORAL
Qty: 60 TABLET | Refills: 2 | Status: SHIPPED | OUTPATIENT
Start: 2021-05-17 | End: 2021-06-30 | Stop reason: ALTCHOICE

## 2021-05-17 RX ORDER — BENZTROPINE MESYLATE 1 MG/1
TABLET ORAL
COMMUNITY
Start: 2021-04-29

## 2021-05-17 NOTE — PROGRESS NOTES
FOLLOW-UP OFFICE VISIT  [facility]    NAME: Karissa Alcantar  AGE: 63 y o  SEX: female  : 1960     DATE: 2021     Assessment and Plan:     Problem List Items Addressed This Visit        Other    Anxiety - Primary      Other Visit Diagnoses     Anemia, unspecified type        Relevant Orders    Iron Panel (Includes Ferritin, Iron Sat%, Iron, and TIBC)    CBC and differential    Occult blood 1-3, stool    Tremor        Relevant Orders    Ambulatory referral to Neurology    Shuffling gait        Relevant Orders    Ambulatory referral to Neurology    Difficulty urinating        Relevant Orders    UA w Reflex to Microscopic w Reflex to Culture -Lab Collect    Urinary incontinence, unspecified type        Weight loss        Relevant Orders    TSH, 3rd generation with Free T4 reflex        Plan: will continue work up for anemia  Pt has associated 20;b weight loss over the past 3 months  Will check FOBT  Beside anemia, recent labs were otherwise unremarkable  Will add TSH  Pt encouraged to not skp meals  Will refer to neurology for further eval concerning possible parkinsonian tremor  Return in about 3 months (around 2021) for Next scheduled follow up  Chief Complaint:     Chief Complaint   Patient presents with    Follow-up     3 months, Psychiatrist saw signs of Parkisnons  History of Present Illness:     64 yo female with pmhx listed below presents for f/u  Presents with [employer] employee Nichole  C/o of tremors  Onset unknown  Has been noticed by other employee  Pt reports her whole body shakes  Kathia Hirschfeld states most  noticeable when she is walking and trying to carry things  slightly unsteady on feet but no falls  Was evaluated by psych and there was concern for parkinsons  Psych started cogentin almost 2 weeks ago  No improvement  Pt denies difficulty brushing teeth, combing hair or self feeding   Reports difficutly carrying packages  Review of Systems:     Review of Systems   Constitutional: Positive for appetite change (occasionally skips lunch)  HENT: Negative for trouble swallowing  Respiratory: Positive for shortness of breath  Negative for cough  Cardiovascular: Negative for chest pain  Gastrointestinal: Negative for constipation and diarrhea  Genitourinary: Positive for difficulty urinating  Musculoskeletal: Negative for gait problem  Neurological: Positive for tremors, weakness and headaches  Problem List:     Patient Active Problem List   Diagnosis    Anxiety    Depression    Hyperlipidemia    Esophageal reflux    Schizoaffective disorder ([facility] )    Chronic obstructive pulmonary disease ([place] [address] )    Encounter for gynecological examination without abnormal finding    Post-menopausal    Benzodiazepine dependence ([place] [address] )        Objective:     /84 (BP Location: Left arm, Patient Position: Sitting, Cuff Size: Standard)   Pulse 67   Temp 97 7 °F (36 5 °C) (Temporal) Comment: no nsaids  Wt 71 8 kg (158 lb 6 4 oz)   SpO2 98%   BMI 25 57 kg/m²     Physical Exam  HENT:      Head: Normocephalic and atraumatic  Right Ear: Tympanic membrane and external ear normal       Left Ear: Tympanic membrane and external ear normal    Eyes:      Extraocular Movements: Extraocular movements intact  Conjunctiva/sclera: Conjunctivae normal       Pupils: Pupils are equal, round, and reactive to light  Cardiovascular:      Rate and Rhythm: Normal rate and regular rhythm  Heart sounds: No murmur  Pulmonary:      Effort: Pulmonary effort is normal       Breath sounds: Normal breath sounds  Abdominal:      General: Bowel sounds are normal       Palpations: Abdomen is soft  Musculoskeletal:      Right lower leg: No edema  Left lower leg: No edema  Neurological:      Mental Status: She is alert  Cranial Nerves: No dysarthria        Motor: Tremor (very low amplitude; b/l hands; noted at rest ) present  No pronator drift  Coordination: Romberg sign negative  Finger-Nose-Finger Test normal       Gait: Gait abnormal (shuffling )  Psychiatric:         Mood and Affect: Affect is flat  Speech: Speech is delayed  Behavior: Behavior is cooperative           Pertinent Laboratory/Diagnostic Studies:    Laboratory Results: I have personally reviewed the pertinent laboratory results/reports         Velia BLANC: [facility]  5/17/2021 12:06 PM

## 2021-05-17 NOTE — TELEPHONE ENCOUNTER
----- Message from Abi Edmond DO sent at 5/17/2021  5:19 PM EDT -----  Iron deficiency anemia   Will send oral iron to pharmacy

## 2021-05-17 NOTE — TELEPHONE ENCOUNTER
----- Message from Val Rossi DO sent at 5/17/2021  5:19 PM EDT -----  Iron deficiency anemia   Will send oral iron to pharmacy

## 2021-05-17 NOTE — PATIENT INSTRUCTIONS
Try to avoid skipping meals  Follow up with neurology as soon as possible  Make sure to get blood work completed before next visit

## 2021-05-18 ENCOUNTER — TELEPHONE (OUTPATIENT)
Dept: NEUROLOGY | Facility: CLINIC | Age: 61
End: 2021-05-18

## 2021-05-18 LAB
BACTERIA UR QL AUTO: ABNORMAL /HPF
NON-SQ EPI CELLS URNS QL MICRO: ABNORMAL /HPF
RBC #/AREA URNS AUTO: ABNORMAL /HPF
WBC #/AREA URNS AUTO: ABNORMAL /HPF

## 2021-05-18 NOTE — TELEPHONE ENCOUNTER
Best contact number for patient:  [phone]    Emergency Contact name and number:  Ilsa Cortez Sister [phone]         Referring provider and telephone number:  Brandi Lange    Primary Care Provider Name and if affiliated with [facility]:   [address]    Reason for Appointment/Dx:  Idania Powell    Have you seen and followed up with a pediatric Neurologist for this disease in the past?    No    Neurology Location patient would like to be seen: Saida Jacob received? yes                                                Records Received?      yes  Have you ever seen another Neurologist?     [employer] Name: Medicare A and B    ID/Policy #:      Workman's Comp/ Accident/ School  Information      Workman's Comp/Accident/School related?    no  Appointment date:   05/19/2021

## 2021-05-19 ENCOUNTER — CONSULT (OUTPATIENT)
Dept: NEUROLOGY | Facility: CLINIC | Age: 61
End: 2021-05-19
Payer: MEDICARE

## 2021-05-19 ENCOUNTER — TELEPHONE (OUTPATIENT)
Dept: INTERNAL MEDICINE CLINIC | Facility: CLINIC | Age: 61
End: 2021-05-19

## 2021-05-19 VITALS
HEART RATE: 66 BPM | WEIGHT: 158 LBS | BODY MASS INDEX: 25.39 KG/M2 | DIASTOLIC BLOOD PRESSURE: 70 MMHG | SYSTOLIC BLOOD PRESSURE: 88 MMHG | HEIGHT: 66 IN

## 2021-05-19 DIAGNOSIS — N30.90 CYSTITIS: Primary | ICD-10-CM

## 2021-05-19 DIAGNOSIS — F32.A DEPRESSION, UNSPECIFIED DEPRESSION TYPE: ICD-10-CM

## 2021-05-19 DIAGNOSIS — R26.89 SHUFFLING GAIT: ICD-10-CM

## 2021-05-19 DIAGNOSIS — R25.1 TREMOR: ICD-10-CM

## 2021-05-19 DIAGNOSIS — E78.5 HYPERLIPIDEMIA, UNSPECIFIED HYPERLIPIDEMIA TYPE: ICD-10-CM

## 2021-05-19 DIAGNOSIS — F25.1 SCHIZOAFFECTIVE DISORDER, DEPRESSIVE TYPE (HCC): ICD-10-CM

## 2021-05-19 DIAGNOSIS — F41.9 ANXIETY: ICD-10-CM

## 2021-05-19 PROCEDURE — 99204 OFFICE O/P NEW MOD 45 MIN: CPT | Performed by: PSYCHIATRY & NEUROLOGY

## 2021-05-19 RX ORDER — SULFAMETHOXAZOLE AND TRIMETHOPRIM 800; 160 MG/1; MG/1
1 TABLET ORAL 2 TIMES DAILY
Qty: 6 TABLET | Refills: 0 | Status: SHIPPED | OUTPATIENT
Start: 2021-05-19 | End: 2021-05-22

## 2021-05-19 NOTE — TELEPHONE ENCOUNTER
----- Message from Darien Brown DO sent at 5/19/2021  8:30 AM EDT -----  UA suggest UTI   Will send bactrim to pharmacy

## 2021-05-19 NOTE — PROGRESS NOTES
Christophe Colon is a 63 y o  female  Chief Complaint   Patient presents with    Tremors    Gait Abnormality       Assessment:  1  Tremor    2  Shuffling gait    3  Anxiety    4  Hyperlipidemia, unspecified hyperlipidemia type    5  Depression, unspecified depression type    6  Schizoaffective disorder, depressive type ([facility] )        Plan:   MRI scan of the brain  fall safety and aspiration precautions  follow-up in 2-3 months     Discussion:   differential diagnosis discussed with the patient, patient does have parkinsonian features that could be secondary to her psychotropic medications she has been on Geodon, according to the patient her tremor is not that bothersome I did advise her to have an MRI scan of the brain and also to call me after the test to discuss the results she was advised to discuss with her psychiatrist to see if any of her medications can be changed or the dosage decreased, if the tremor is bothersome then we could consider putting her on a dopamine agonist as patient does not want to go on any medications at this time, she was advised to remain physically and mentally active to keep her blood pressure cholesterol and sugar under control, she was advised to follow up with family physician regarding her low blood pressure and to keep herself well hydrated, to go to the hospital if has any worsening symptoms and call me otherwise to see me back in 2-3 months and follow up with her other physicians      Subjective:    HPI    54-year-old female with past medical history of schizoaffective disorder who lives in a group home is here for evaluation of tremors, according to the patient she has had this at least 4 years she feels right hand is worse than the left hand and it is most noticeable when she is walking and trying to carry things, she denies having it every day she feels slightly unsteady on her feet but no falls, she was evaluated by psych and there was concern for Parkinson's psych started her on Cogentin almost 2 weeks back there was no improvement as per the primary care note, patient denies having difficulty brushing teeth combing hair or self feeding she denies having any early morning stiffness that is no difficulty in swallowing she reports some difficulty carrying packages, no freezing episodes, no other complaints      Vitals:    05/19/21 1526   BP: (!) 88/70   BP Location: Left arm   Patient Position: Sitting   Cuff Size: Adult   Pulse: 66   Weight: 71 7 kg (158 lb)   Height: 5' 6" (1 676 m)       Current Medications    Current Outpatient Medications:     aspirin 81 mg chewable tablet, Chew 81 mg daily, Disp: , Rfl:     atorvastatin (LIPITOR) 10 mg tablet, Take 1 tablet (10 mg total) by mouth daily, Disp: 30 tablet, Rfl: 0    benztropine (COGENTIN) 1 mg tablet, , Disp: , Rfl:     clonazePAM (KLONOPIN) 1 mg tablet, Take 1 mg by mouth 3 (three) times a day , Disp: , Rfl:     docusate sodium (COLACE) 100 mg capsule, Take 100 mg by mouth 2 (two) times a day, Disp: , Rfl:     hydrOXYzine HCL (ATARAX) 25 mg tablet, Take 1 tablet (25 mg total) by mouth every 6 (six) hours as needed for itching (Patient taking differently: Take 25 mg by mouth every 6 (six) hours as needed For anxiety- patient needs this daily), Disp: , Rfl: 0    magnesium Oxide (MAG-OX) 400 mg TABS, Take 400 mg by mouth 2 (two) times a day, Disp: , Rfl:     melatonin 3 mg, Take 6 mg by mouth, Disp: , Rfl:     oxybutynin (DITROPAN) 5 mg tablet, , Disp: , Rfl:     pantoprazole (PROTONIX) 40 mg tablet, Take 1 tablet (40 mg total) by mouth daily, Disp: 90 tablet, Rfl: 1    sertraline (ZOLOFT) 100 mg tablet, Take 150 mg by mouth 2 (two) times a day , Disp: , Rfl:     traZODone (DESYREL) 150 mg tablet, Take 150 mg by mouth daily at bedtime , Disp: , Rfl:     ziprasidone (GEODON) 40 mg capsule, , Disp: , Rfl:     ACETAMINOPHEN EXTRA STRENGTH 500 MG tablet, TAKE 1 TABLET BY MOUTH EVERY 6 HOURS AS NEEDED FOR MILD PAIN, Disp: , Rfl: 5    ferrous sulfate 324 (65 Fe) mg, Take 1 tablet (324 mg total) by mouth 2 (two) times a day before meals, Disp: 60 tablet, Rfl: 2    sulfamethoxazole-trimethoprim (BACTRIM DS) 800-160 mg per tablet, Take 1 tablet by mouth 2 (two) times a day for 3 days, Disp: 6 tablet, Rfl: 0    tolnaftate (TINACTIN) 1 % cream, Apply topically 2 (two) times a day, Disp: , Rfl:       Allergies  Quetiapine and Penicillins    Past Medical History  Past Medical History:   Diagnosis Date    Abnormal weight loss     Anxiety     Benzodiazepine abuse (HCC)     Depression     Facial cellulitis     Malaise     Nicotine dependence     Psychiatric disorder     Suicidal ideation          Past Surgical History:  Past Surgical History:   Procedure Laterality Date    HIP SURGERY           Family History:  Family History   Problem Relation Age of Onset    Diabetes Mother     Stroke Mother         Syndrome    Heart attack Father     Other Brother         Intellectual Disabilities    No Known Problems Sister     No Known Problems Maternal Grandmother     Breast cancer Paternal Grandmother 68    Breast cancer Maternal Aunt 39    No Known Problems Maternal Aunt     No Known Problems Maternal Aunt     Colon cancer Neg Hx     Ovarian cancer Neg Hx     Cervical cancer Neg Hx     Uterine cancer Neg Hx        Social History:   reports that she has been smoking  She started smoking about 44 years ago  She has a 10 00 pack-year smoking history  She has never used smokeless tobacco  She reports that she does not drink alcohol or use drugs  I have reviewed the past medical history, surgical history, social and family history, current medications, allergies vitals, review of systems, and updated this information as appropriate today  Objective:    Physical Exam    Neurological Exam    GENERAL:  Cooperative in no acute distress   Well-developed and well-nourished    HEAD and NECK   Head is atraumatic normocephalic with no lesions or masses  Neck is supple with full range of motion    CARDIOVASCULAR  Carotid Arteries-no carotid bruits  NEUROLOGIC:  Mental Status-the patient is awake alert and oriented without aphasia or apraxia  Cranial Nerves: Visual fields are full to confrontation  Extraocular movements are full without nystagmus  Pupils are 2-1/2 mm and reactive  Face is symmetrical to light touch  Movements of facial expression move symmetrically  Hearing is normal to finger rub bilaterally  Soft palate lifts symmetrically  Shoulder shrug is symmetrical  Tongue is midline without atrophy  Patient does have some parkinsonian facial features  Motor: No drift is noted on arm extension  Strength is full in the upper and lower extremities with normal bulk and  Slight cogwheeling rigidity left hand worse than the right hand, slight tremor of outstretched hands  Sensory: Intact to temperature and vibratory sensation in the upper and lower extremities bilaterally  Cortical function is intact  Coordination: Finger to nose testing is performed accurately  Patient does have a shuffling gait and with decreased arm swing  Reflexes:    2+ and symmetrical          ROS:  Review of Systems   Constitutional: Negative  Negative for appetite change, fatigue and fever  HENT: Negative  Negative for drooling, hearing loss, tinnitus, trouble swallowing and voice change  Eyes: Negative for photophobia and pain  Respiratory: Negative  Negative for shortness of breath  Cardiovascular: Negative  Negative for palpitations  Gastrointestinal: Negative  Negative for nausea and vomiting  Endocrine: Negative  Negative for cold intolerance  Genitourinary: Negative  Negative for dysuria, frequency and urgency  Musculoskeletal: Positive for gait problem  Negative for myalgias and neck pain  Skin: Negative  Negative for rash  Neurological: Positive for tremors   Negative for dizziness, seizures, syncope, facial asymmetry, speech difficulty, weakness, light-headedness, numbness and headaches  Hematological: Negative  Does not bruise/bleed easily  Psychiatric/Behavioral: Positive for decreased concentration and dysphoric mood  Negative for confusion, hallucinations and sleep disturbance  The patient is nervous/anxious

## 2021-05-27 ENCOUNTER — TRANSCRIBE ORDERS (OUTPATIENT)
Dept: LAB | Facility: HOSPITAL | Age: 61
End: 2021-05-27

## 2021-05-27 DIAGNOSIS — D64.9 ANEMIA, UNSPECIFIED TYPE: Primary | ICD-10-CM

## 2021-06-07 ENCOUNTER — HOSPITAL ENCOUNTER (OUTPATIENT)
Dept: MRI IMAGING | Facility: CLINIC | Age: 61
Discharge: HOME | End: 2021-06-07
Payer: MEDICARE

## 2021-06-07 DIAGNOSIS — R25.1 TREMOR: ICD-10-CM

## 2021-06-07 PROCEDURE — G1004 CDSM NDSC: HCPCS

## 2021-06-07 PROCEDURE — 70551 MRI BRAIN STEM W/O DYE: CPT

## 2021-06-14 ENCOUNTER — APPOINTMENT (OUTPATIENT)
Dept: LAB | Facility: CLINIC | Age: 61
End: 2021-06-14
Payer: MEDICARE

## 2021-06-14 DIAGNOSIS — D64.9 ANEMIA, UNSPECIFIED TYPE: ICD-10-CM

## 2021-06-14 LAB — HEMOCCULT STL QL IA: NEGATIVE

## 2021-06-14 PROCEDURE — G0328 FECAL BLOOD SCRN IMMUNOASSAY: HCPCS

## 2021-06-30 ENCOUNTER — OFFICE VISIT (OUTPATIENT)
Dept: INTERNAL MEDICINE CLINIC | Facility: CLINIC | Age: 61
End: 2021-06-30
Payer: MEDICARE

## 2021-06-30 VITALS
TEMPERATURE: 97.4 F | OXYGEN SATURATION: 98 % | HEIGHT: 66 IN | BODY MASS INDEX: 24.94 KG/M2 | DIASTOLIC BLOOD PRESSURE: 60 MMHG | HEART RATE: 81 BPM | SYSTOLIC BLOOD PRESSURE: 78 MMHG | WEIGHT: 155.2 LBS

## 2021-06-30 DIAGNOSIS — D50.8 IRON DEFICIENCY ANEMIA SECONDARY TO INADEQUATE DIETARY IRON INTAKE: ICD-10-CM

## 2021-06-30 DIAGNOSIS — R41.0 CONFUSION: Primary | ICD-10-CM

## 2021-06-30 DIAGNOSIS — E86.1 HYPOTENSION DUE TO HYPOVOLEMIA: ICD-10-CM

## 2021-06-30 DIAGNOSIS — I95.89 HYPOTENSION DUE TO HYPOVOLEMIA: ICD-10-CM

## 2021-06-30 DIAGNOSIS — N30.00 ACUTE CYSTITIS WITHOUT HEMATURIA: ICD-10-CM

## 2021-06-30 DIAGNOSIS — R29.6 FREQUENT FALLS: ICD-10-CM

## 2021-06-30 PROBLEM — I95.9 ARTERIAL HYPOTENSION: Status: ACTIVE | Noted: 2021-06-30

## 2021-06-30 PROCEDURE — 99214 OFFICE O/P EST MOD 30 MIN: CPT | Performed by: NURSE PRACTITIONER

## 2021-06-30 RX ORDER — MINERAL OIL AND PETROLATUM 150; 830 MG/G; MG/G
OINTMENT OPHTHALMIC
COMMUNITY

## 2021-06-30 RX ORDER — CIPROFLOXACIN 250 MG/1
TABLET, FILM COATED ORAL
COMMUNITY
Start: 2021-06-27 | End: 2021-07-30

## 2021-06-30 NOTE — ASSESSMENT & PLAN NOTE
Patient with increased confusion over the past several days  She is currently being treated for UTI and has been on Cipro since Sunday     Emergency room evaluation recommended

## 2021-06-30 NOTE — ASSESSMENT & PLAN NOTE
The patient with a history of iron deficiency anemia  She had been on iron supplementation in the past but is no longer taking this  She does not have any recent blood work  Patient does have a  Diagnosis of schizophrenia and is living in a group home  She is accompanied to the office today by a   They report the patient having 2-3 falls over the last several days without injury  In addition there is some increased confusion  They also report the patient's appetite has been decreased and she is drinking mainly chocolate milk  She was seen at an urgent care on Sunday and was treated for a urinary tract infection and is currently on Cipro  Her confusion and fall started before the Cipro  In addition the patient's blood pressure is quite low today in the office after being checked several times  It is 78/60  I did recommend that the patient be evaluated in the emergency room at this time due to her hypotension, confusion and possible anemia

## 2021-06-30 NOTE — ASSESSMENT & PLAN NOTE
Patient with low blood pressure in the office today  Her pressure is 78/60  This was checked several times both manually and with a Tabatha mat  Patient is having some increased confusion, several falls in the group home, pallor in some fatigue  I did recommend emergency room evaluation at this time

## 2021-06-30 NOTE — PROGRESS NOTES
[facility]    NAME: Eleno Ponce  AGE: 63 y o  SEX: female  : 1960     DATE: 2021     Assessment and Plan:     Problem List Items Addressed This Visit        Cardiovascular and Mediastinum    Hypotension due to hypovolemia      Patient with low blood pressure in the office today  Her pressure is 78/60  This was checked several times both manually and with a Tabatha mat  Patient is having some increased confusion, several falls in the group home, pallor in some fatigue  I did recommend emergency room evaluation at this time  Nervous and Auditory    Confusion - Primary       Patient with increased confusion over the past several days  She is currently being treated for UTI and has been on Cipro since   Emergency room evaluation recommended         Relevant Orders    Comprehensive metabolic panel       Other    Iron deficiency anemia secondary to inadequate dietary iron intake     The patient with a history of iron deficiency anemia  She had been on iron supplementation in the past but is no longer taking this  She does not have any recent blood work  Patient does have a  Diagnosis of schizophrenia and is living in a group home  She is accompanied to the office today by a   They report the patient having 2-3 falls over the last several days without injury  In addition there is some increased confusion  They also report the patient's appetite has been decreased and she is drinking mainly chocolate milk  She was seen at an urgent care on  and was treated for a urinary tract infection and is currently on Cipro  Her confusion and fall started before the Cipro  In addition the patient's blood pressure is quite low today in the office after being checked several times  It is 78/60    I did recommend that the patient be evaluated in the emergency room at this time due to her hypotension, confusion and possible anemia  Relevant Orders    CBC and differential    Iron Panel (Includes Ferritin, Iron Sat%, Iron, and TIBC)      Other Visit Diagnoses     Acute cystitis without hematuria        Frequent falls              BMI Counseling: Body mass index is 25 05 kg/m²  Follow-up plan was not completed due to patient being in urgent or emergent medical situation  No follow-ups on file  Chief Complaint:     Chief Complaint   Patient presents with    Fall     x2 w/o injury    Confusion     unrelated to fall    Memory Loss     unrelated to fall    Tremors     unrelated to fall        History of Present Illness:     Seb Quintero   Presents to the office today with a  from her group home  The patient has had increased confusion, frequent falls, fatigue and memory issues for the past several days  She was seen at an urgent care on June 27th and is being treated for urinary tract infection  Her symptoms have not improved  She does appeared to me to be pale in color  She has a history of iron deficiency anemia  Her last colonoscopy was 5-6 years ago  Her blood pressure is quite low in the office today  I did recommend emergency room evaluation for her above symptoms  They will be taking the patient to [facility] today as this is closer  Review of Systems:     Review of Systems   Constitutional: Positive for fatigue  Negative for activity change and fever  HENT: Negative for congestion, hearing loss, rhinorrhea, trouble swallowing and voice change  Eyes: Negative for photophobia, pain, discharge and visual disturbance  Respiratory: Negative for cough, chest tightness and shortness of breath  Cardiovascular: Negative for chest pain, palpitations and leg swelling  Gastrointestinal: Negative for abdominal pain, blood in stool, constipation, nausea and vomiting  Endocrine: Negative for cold intolerance and heat intolerance     Genitourinary: Negative for difficulty urinating, frequency, hematuria, urgency, vaginal bleeding and vaginal discharge  Musculoskeletal: Positive for gait problem  Negative for arthralgias and myalgias  Skin: Negative  Neurological: Positive for tremors  Negative for dizziness, weakness, numbness and headaches  Psychiatric/Behavioral: Positive for confusion  Negative for decreased concentration  The patient is not nervous/anxious  Problem List:     Patient Active Problem List   Diagnosis    Anxiety    Depression    Hyperlipidemia    Esophageal reflux    [facility]izoaffective disorder ([facility] )    Chronic obstructive pulmonary disease ([place] [address] )    Encounter for gynecological examination without abnormal finding    Post-menopausal    Benzodiazepine dependence ([place] [address] )        Objective:     BP (!) 78/60   Pulse 81   Temp (!) 97 4 °F (36 3 °C) (Tympanic)   Ht 5' 6" (1 676 m)   Wt 70 4 kg (155 lb 3 2 oz)   SpO2 98%   BMI 25 05 kg/m²     Current Outpatient Medications   Medication Instructions    artificial tear (LUBRIFRESH P M ) 83-15 % ophthalmic ointment Daily at bedtime    aspirin 81 mg, Oral, Daily    atorvastatin (LIPITOR) 10 mg, Oral, Daily    benztropine (COGENTIN) 1 mg tablet No dose, route, or frequency recorded   ciprofloxacin (CIPRO) 250 mg tablet No dose, route, or frequency recorded   clonazePAM (KLONOPIN) 1 mg, Oral, 3 times daily    docusate sodium (COLACE) 100 mg, Oral, 2 times daily    hydrOXYzine HCL (ATARAX) 25 mg, Oral, Every 6 hours PRN    magnesium Oxide (MAG-OX) 400 mg, Oral, 2 times daily    melatonin 6 mg, Oral    oxybutynin (DITROPAN) 5 mg tablet No dose, route, or frequency recorded   pantoprazole (PROTONIX) 40 mg, Oral, Daily    sertraline (ZOLOFT) 150 mg, Oral, 2 times daily    tolnaftate (TINACTIN) 1 % cream 2 times daily    traZODone (DESYREL) 150 mg, Oral, Daily at bedtime    ziprasidone (GEODON) 40 mg capsule No dose, route, or frequency recorded  Physical Exam  Vitals reviewed  Constitutional:       Appearance: Normal appearance  HENT:      Head: Normocephalic  Nose: Nose normal       Mouth/Throat:      Mouth: Mucous membranes are moist       Pharynx: Oropharynx is clear  Eyes:      Extraocular Movements: Extraocular movements intact  Pupils: Pupils are equal, round, and reactive to light  Cardiovascular:      Rate and Rhythm: Normal rate and regular rhythm  Pulmonary:      Effort: Pulmonary effort is normal       Breath sounds: Normal breath sounds  Musculoskeletal:         General: Normal range of motion  Skin:     General: Skin is warm and dry  Coloration: Skin is pale  Neurological:      General: No focal deficit present  Mental Status: She is alert and oriented to person, place, and time  Psychiatric:         Attention and Perception: Attention normal          Mood and Affect: Affect is flat  Speech: Speech is delayed  Behavior: Behavior is slowed  Cognition and Memory: Cognition is impaired  Memory is impaired           KENNETH Robledo  [facility]

## 2021-07-13 DIAGNOSIS — K21.9 GASTROESOPHAGEAL REFLUX DISEASE: ICD-10-CM

## 2021-07-13 DIAGNOSIS — K59.00 CONSTIPATION, UNSPECIFIED CONSTIPATION TYPE: Primary | ICD-10-CM

## 2021-07-13 DIAGNOSIS — R32 URINARY INCONTINENCE, UNSPECIFIED TYPE: ICD-10-CM

## 2021-07-13 DIAGNOSIS — E78.5 HYPERLIPIDEMIA, UNSPECIFIED HYPERLIPIDEMIA TYPE: ICD-10-CM

## 2021-07-15 RX ORDER — ATORVASTATIN CALCIUM 10 MG/1
10 TABLET, FILM COATED ORAL DAILY
Qty: 30 TABLET | Refills: 0 | Status: SHIPPED | OUTPATIENT
Start: 2021-07-15 | End: 2021-07-30

## 2021-07-15 RX ORDER — OXYBUTYNIN CHLORIDE 5 MG/1
5 TABLET ORAL 2 TIMES DAILY
Qty: 60 TABLET | Refills: 0 | Status: SHIPPED | OUTPATIENT
Start: 2021-07-15 | End: 2021-07-30

## 2021-07-15 RX ORDER — LANOLIN ALCOHOL/MO/W.PET/CERES
400 CREAM (GRAM) TOPICAL 2 TIMES DAILY
Qty: 60 TABLET | Refills: 0 | Status: SHIPPED | OUTPATIENT
Start: 2021-07-15 | End: 2021-07-30

## 2021-07-15 RX ORDER — PANTOPRAZOLE SODIUM 40 MG/1
40 TABLET, DELAYED RELEASE ORAL DAILY
Qty: 30 TABLET | Refills: 0 | Status: SHIPPED | OUTPATIENT
Start: 2021-07-15 | End: 2021-07-30

## 2021-07-15 RX ORDER — DOCUSATE SODIUM 100 MG/1
100 CAPSULE, LIQUID FILLED ORAL DAILY
Qty: 30 CAPSULE | Refills: 0 | Status: SHIPPED | OUTPATIENT
Start: 2021-07-15 | End: 2021-07-30

## 2021-07-15 RX ORDER — ASPIRIN 81 MG/1
81 TABLET, CHEWABLE ORAL DAILY
Qty: 30 TABLET | Refills: 0 | Status: SHIPPED | OUTPATIENT
Start: 2021-07-15 | End: 2021-07-30

## 2021-07-29 DIAGNOSIS — R32 URINARY INCONTINENCE, UNSPECIFIED TYPE: ICD-10-CM

## 2021-07-29 DIAGNOSIS — K59.00 CONSTIPATION, UNSPECIFIED CONSTIPATION TYPE: ICD-10-CM

## 2021-07-29 DIAGNOSIS — K21.9 GASTROESOPHAGEAL REFLUX DISEASE: ICD-10-CM

## 2021-07-29 DIAGNOSIS — E78.5 HYPERLIPIDEMIA, UNSPECIFIED HYPERLIPIDEMIA TYPE: ICD-10-CM

## 2021-07-30 RX ORDER — PANTOPRAZOLE SODIUM 40 MG/1
TABLET, DELAYED RELEASE ORAL
Qty: 28 TABLET | Refills: 4 | Status: SHIPPED | OUTPATIENT
Start: 2021-07-30 | End: 2022-02-04 | Stop reason: SDUPTHER

## 2021-07-30 RX ORDER — ATORVASTATIN CALCIUM 10 MG/1
TABLET, FILM COATED ORAL
Qty: 28 TABLET | Refills: 4 | Status: SHIPPED | OUTPATIENT
Start: 2021-07-30 | End: 2022-02-04 | Stop reason: SDUPTHER

## 2021-07-30 RX ORDER — OXYBUTYNIN CHLORIDE 5 MG/1
TABLET ORAL
Qty: 56 TABLET | Refills: 4 | Status: SHIPPED | OUTPATIENT
Start: 2021-07-30 | End: 2022-02-04 | Stop reason: SDUPTHER

## 2021-07-30 RX ORDER — ASPIRIN 81 MG/1
TABLET, DELAYED RELEASE ORAL
Qty: 28 TABLET | Refills: 4 | Status: SHIPPED | OUTPATIENT
Start: 2021-07-30

## 2021-07-30 RX ORDER — DOCUSATE SODIUM 100 MG/1
CAPSULE, LIQUID FILLED ORAL
Qty: 28 CAPSULE | Refills: 4 | Status: SHIPPED | OUTPATIENT
Start: 2021-07-30 | End: 2021-08-19 | Stop reason: SDUPTHER

## 2021-07-30 RX ORDER — MAGNESIUM OXIDE 400 MG/1
TABLET ORAL
Qty: 56 TABLET | Refills: 4 | Status: SHIPPED | OUTPATIENT
Start: 2021-07-30 | End: 2021-08-04

## 2021-08-03 DIAGNOSIS — K59.00 CONSTIPATION, UNSPECIFIED CONSTIPATION TYPE: ICD-10-CM

## 2021-08-04 RX ORDER — MAGNESIUM OXIDE 400 MG/1
TABLET ORAL
Qty: 56 TABLET | Refills: 4 | Status: SHIPPED | OUTPATIENT
Start: 2021-08-04 | End: 2022-02-04 | Stop reason: SDUPTHER

## 2021-08-15 ENCOUNTER — RA CDI HCC (OUTPATIENT)
Dept: OTHER | Facility: HOSPITAL | Age: 61
End: 2021-08-15

## 2021-08-15 NOTE — PROGRESS NOTES
[facility]  coding opportunities       Chart reviewed, no opportunity found: CHART REVIEWED, NO OPPORTUNITY FOUND                        Patients insurance company: [employer]

## 2021-08-19 ENCOUNTER — OFFICE VISIT (OUTPATIENT)
Dept: INTERNAL MEDICINE CLINIC | Facility: CLINIC | Age: 61
End: 2021-08-19
Payer: MEDICARE

## 2021-08-19 VITALS
SYSTOLIC BLOOD PRESSURE: 114 MMHG | OXYGEN SATURATION: 98 % | DIASTOLIC BLOOD PRESSURE: 68 MMHG | HEART RATE: 85 BPM | HEIGHT: 66 IN | TEMPERATURE: 98.1 F | WEIGHT: 143 LBS | BODY MASS INDEX: 22.98 KG/M2

## 2021-08-19 DIAGNOSIS — K59.00 CONSTIPATION, UNSPECIFIED CONSTIPATION TYPE: ICD-10-CM

## 2021-08-19 DIAGNOSIS — R25.1 TREMOR: ICD-10-CM

## 2021-08-19 DIAGNOSIS — R26.2 AMBULATORY DYSFUNCTION: ICD-10-CM

## 2021-08-19 DIAGNOSIS — D50.8 IRON DEFICIENCY ANEMIA SECONDARY TO INADEQUATE DIETARY IRON INTAKE: Primary | ICD-10-CM

## 2021-08-19 DIAGNOSIS — Z00.00 ENCOUNTER FOR SUBSEQUENT ANNUAL WELLNESS VISIT (AWV) IN MEDICARE PATIENT: ICD-10-CM

## 2021-08-19 DIAGNOSIS — F01.51 VASCULAR DEMENTIA WITH BEHAVIOR DISTURBANCE (HCC): ICD-10-CM

## 2021-08-19 PROCEDURE — G0439 PPPS, SUBSEQ VISIT: HCPCS | Performed by: FAMILY MEDICINE

## 2021-08-19 PROCEDURE — 99214 OFFICE O/P EST MOD 30 MIN: CPT | Performed by: FAMILY MEDICINE

## 2021-08-19 RX ORDER — DOCUSATE SODIUM 100 MG/1
100 CAPSULE, LIQUID FILLED ORAL 2 TIMES DAILY PRN
Qty: 28 CAPSULE | Refills: 4 | Status: SHIPPED | OUTPATIENT
Start: 2021-08-19 | End: 2022-02-04 | Stop reason: SDUPTHER

## 2021-08-19 RX ORDER — FERROUS SULFATE TAB EC 324 MG (65 MG FE EQUIVALENT) 324 (65 FE) MG
324 TABLET DELAYED RESPONSE ORAL
Qty: 60 TABLET | Refills: 2 | Status: SHIPPED | OUTPATIENT
Start: 2021-08-19 | End: 2021-09-23

## 2021-08-19 NOTE — PROGRESS NOTES
Assessment and Plan:     Problem List Items Addressed This Visit        Other    Iron deficiency anemia secondary to inadequate dietary iron intake - Primary    Relevant Medications    ferrous sulfate 324 (65 Fe) mg      Other Visit Diagnoses     Vascular dementia with behavior disturbance (HCC)        Ambulatory dysfunction        Tremor        Constipation, unspecified constipation type        Relevant Medications    docusate sodium (COLACE) 100 mg capsule    Encounter for subsequent annual wellness visit (AWV) in Medicare patient               Preventive health issues were discussed with patient, and age appropriate screening tests were ordered as noted in patient's After Visit Summary  Personalized health advice and appropriate referrals for health education or preventive services given if needed, as noted in patient's After Visit Summary       History of Present Illness:     Patient presents for Medicare Annual Wellness visit    Patient Care Team:  Donovan Johnson DO as PCP - General (Family Medicine)  MD Ramón Gordon MD     Problem List:     Patient Active Problem List   Diagnosis    Anxiety    Depression    Hyperlipidemia    Esophageal reflux    [facility] [address] [facility]    Chronic obstructive pulmonary disease ([facility] [facility]    Encounter for gynecological examination without abnormal finding    Post-menopausal    Benzodiazepine dependence ([facility] [facility]    Confusion    Iron deficiency anemia secondary to inadequate dietary iron intake    Hypotension due to hypovolemia      Past Medical and Surgical History:     Past Medical History:   Diagnosis Date    Abnormal weight loss     Anxiety     Benzodiazepine abuse ([facility] [address] [facility]     Depression     Facial cellulitis     Malaise     Nicotine dependence     Psychiatric disorder     Suicidal ideation      Past Surgical History:   Procedure Laterality Date    HIP SURGERY Right       Family History:     Family History   Problem Relation Age of Onset    Diabetes Mother     Stroke Mother         Syndrome    Heart attack Father     Other Brother         Intellectual Disabilities    No Known Problems Sister     No Known Problems Maternal Grandmother     Breast cancer Paternal Grandmother 68    Breast cancer Maternal Aunt 39    No Known Problems Maternal Aunt     No Known Problems Maternal Aunt     Colon cancer Neg Hx     Ovarian cancer Neg Hx     Cervical cancer Neg Hx     Uterine cancer Neg Hx       Social History:     Social History     Socioeconomic History    Marital status: Single     Spouse name: None    Number of children: None    Years of education: None    Highest education level: None   Occupational History    None   Tobacco Use    Smoking status: Former Smoker     Packs/day: 0 50     Years: 20 00     Pack years: 10 00     Start date: 1977    Smokeless tobacco: Never Used    Tobacco comment: current every day smoker per [employer]   Vaping Use    Vaping Use: Never used   Substance and Sexual Activity    Alcohol use: No    Drug use: No    Sexual activity: Not Currently   Other Topics Concern    None   Social History Narrative    None     Social Determinants of Health     Financial Resource Strain:     Difficulty of Paying Living Expenses:    Food Insecurity:     Worried About Running Out of Food in the Last Year:     Ran Out of Food in the Last Year:    Transportation Needs:     Lack of Transportation (Medical):  Lack of Transportation (Non-Medical):    Physical Activity: Inactive    Days of Exercise per Week: 0 days    Minutes of Exercise per Session: 0 min   Stress: No Stress Concern Present    Feeling of Stress :  Only a little   Social Connections:     Frequency of Communication with Friends and Family:     Frequency of Social Gatherings with Friends and Family:     Attends Mandaeism Services:     Active Member of Clubs or Organizations:     Attends Club or Organization Meetings:     Marital Status: Intimate Partner Violence:     Fear of Current or Ex-Partner:     Emotionally Abused:     Physically Abused:     Sexually Abused:       Medications and Allergies:     Current Outpatient Medications   Medication Sig Dispense Refill    artificial tear (LUBRIFRESH P M ) 83-15 % ophthalmic ointment daily at bedtime      atorvastatin (LIPITOR) 10 mg tablet 1 TAB ORALLY AT BEDTIME DX:HYPERLIPIDEMIA 28 tablet 4    benztropine (COGENTIN) 1 mg tablet       clonazePAM (KLONOPIN) 1 mg tablet Take 1 mg by mouth 3 (three) times a day       docusate sodium (COLACE) 100 mg capsule Take 1 capsule (100 mg total) by mouth 2 (two) times a day as needed for constipation 28 capsule 4    hydrOXYzine HCL (ATARAX) 25 mg tablet Take 1 tablet (25 mg total) by mouth every 6 (six) hours as needed for itching  0    magnesium oxide (MAG-OX) 400 mg tablet 1 TAB ORALLY TWICE DAILY DX: SUPPLEMENT *COLD SEAL* 56 tablet 4    melatonin 3 mg Take 6 mg by mouth      oxybutynin (DITROPAN) 5 mg tablet 1 TAB ORALLY TWICE DAILY DX: URINARY FREQUENCY 56 tablet 4    pantoprazole (PROTONIX) 40 mg tablet 1 TAB ORALLY EVERY MORNING DX: GERD 28 tablet 4    sertraline (ZOLOFT) 100 mg tablet Take 150 mg by mouth 2 (two) times a day       SM Aspirin Adult Low Strength 81 MG EC tablet 1 TAB ORALLY EVERY MORNING DX: STROKE PREVENTION 28 tablet 4    tolnaftate (TINACTIN) 1 % cream Apply topically 2 (two) times a day       traZODone (DESYREL) 150 mg tablet Take 150 mg by mouth daily at bedtime       ziprasidone (GEODON) 40 mg capsule       ferrous sulfate 324 (65 Fe) mg Take 1 tablet (324 mg total) by mouth 2 (two) times a day before meals 60 tablet 2     No current facility-administered medications for this visit       Allergies   Allergen Reactions    Quetiapine     Penicillins Rash      Immunizations:     Immunization History   Administered Date(s) Administered    DTaP 5 04/09/2015    Influenza Quadrivalent, 6-35 Months IM 1960    Pneumococcal Polysaccharide PPV23 04/09/2015    SARS-CoV-2 / COVID-19 mRNA IM (Ronald Corrigan) 04/01/2021, 04/30/2021      Health Maintenance:         Topic Date Due    Breast Cancer Screening: Mammogram  09/23/2021    Cervical Cancer Screening  09/30/2022    Colorectal Cancer Screening  01/09/2025    HIV Screening  Completed    Hepatitis C Screening  Completed         Topic Date Due    Influenza Vaccine (1) 09/01/2021      Medicare Health Risk Assessment:     /68   Pulse 85   Temp 98 1 °F (36 7 °C)   Ht 5' 6" (1 676 m)   Wt 64 9 kg (143 lb)   SpO2 98%   BMI 23 08 kg/m²      Saundra is here for her Subsequent Wellness visit  Health Risk Assessment:   Patient rates overall health as good  Patient feels that their physical health rating is slightly worse  Patient is satisfied with their life  Eyesight was rated as same  Hearing was rated as same  Patient feels that their emotional and mental health rating is same  Patients states they are never, rarely angry  Patient states they are never, rarely unusually tired/fatigued  Pain experienced in the last 7 days has been some  Patient's pain rating has been 5/10  Patient states that she has experienced no weight loss or gain in last 6 months  Depression Screening:   PHQ-2 Score: 0  PHQ-9 Score: 0      Fall Risk Screening: In the past year, patient has experienced: history of falling in past year    Number of falls: 2 or more    Urinary Incontinence Screening:   Patient has leaked urine accidently in the last six months  Home Safety:  Patient has trouble with stairs inside or outside of their home  Patient has working smoke alarms and has working carbon monoxide detector  Home safety hazards include: none  Nutrition:   Current diet is Regular  Medications:   Patient is not currently taking any over-the-counter supplements  Patient is not able to manage medications       Activities of Daily Living (ADLs)/Instrumental Activities of Daily Living (IADLs):   Walk and transfer into and out of bed and chair?: Yes  Dress and groom yourself?: Yes    Bathe or shower yourself?: No    Feed yourself? No  Do your laundry/housekeeping?: No  Manage your money, pay your bills and track your expenses?: No  Make your own meals?: No    Do your own shopping?: No    Previous Hospitalizations:   Any hospitalizations or ED visits within the last 12 months?: Yes    How many hospitalizations have you had in the last year?: 1-2    Advance Care Planning:   Living will: Yes    Durable POA for healthcare: Yes    Advanced directive: Yes    End of Life Decisions reviewed with patient: Yes      PREVENTIVE SCREENINGS      Cardiovascular Screening:    General: Screening Not Indicated and History Lipid Disorder      Diabetes Screening:     General: Screening Current      Colorectal Cancer Screening:     General: Screening Current      Breast Cancer Screening:     General: Screening Current      Cervical Cancer Screening:    General: Screening Current      Lung Cancer Screening:     General: Screening Not Indicated      Hepatitis C Screening:    General: Screening Current    Screening, Brief Intervention, and Referral to Treatment (SBIRT)    Screening  Typical number of drinks in a day: 0  Typical number of drinks in a week: 0  Interpretation: Low risk drinking behavior      Single Item Drug Screening:  How often have you used an illegal drug (including marijuana) or a prescription medication for non-medical reasons in the past year? never    Single Item Drug Screen Score: 0  Interpretation: Negative screen for possible drug use disorder      Walter Gramajo DO

## 2021-08-19 NOTE — PROGRESS NOTES
FOLLOW-UP OFFICE VISIT  [facility]    NAME: Patricia Bergman  AGE: 63 y o  SEX: female  : 1960     DATE: 2021     Assessment and Plan:     Problem List Items Addressed This Visit        Other    Iron deficiency anemia secondary to inadequate dietary iron intake - Primary    Relevant Medications    ferrous sulfate 324 (65 Fe) mg      Other Visit Diagnoses     Vascular dementia with behavior disturbance (HCC)        Ambulatory dysfunction        Tremor        Constipation, unspecified constipation type        Relevant Medications    docusate sodium (COLACE) 100 mg capsule      Plan;         Patient supposed to be on oral iron  therapy for her iron deficiency anemia  Has not been taking it  Will refill the medication  Patient is scheduled to follow-up with gastroenterology in the upcoming weeks  Pt has seen neurology  MRI brain shows white matter changes 2/2 chronic microangiopathy  Prior to  Her transition into a group home patient was having difficulty with self hygiene overall self care with ADLs   Multiple times patient was found on the street wandering  Unclear if this was because of her psychiatric illnesses, dementia or both  Since living in a group home patient has been able to maintain housing but that is about it  Self awareness and hygiene has not improved  Patient appetite continues to be predominately liquid calories by choice  Follows with psychiatry who continues to adjust medications as deemed appropriate  There is a very high potential that pt reqeuires  higher level of care    Return in about 3 months (around 2021) for Recheck  Chief Complaint:     Chief Complaint   Patient presents with    Follow-up        History of Present Illness:     69-year-old female past medical history significant for psychiatric disorders, anemia, COPD, and hyperlipidemia presents for follow-up    Presents with her  Nichole carr  Today patient has no concerns  However the group home staff supervisor who knows the patient well has a lot of concerns  Since patient's last hospitalization her memory continues to be an issue  There are strong concerns for dementia which isn't new but seems to be worsening  Patient has to be strongly motivated to perform self hygiene and to eat  At this point patient drinks about 4 gal of trial clinical week and multiple bottles those orange soda  Patient takes in very little food substance  When asked, the patient states that she had a fair dinner last night and had breakfast of cereal this morning  The manager says the patient has had no breakfast and she had 2 bites of dinner last night  Patient lives in a group home currently and often does not take care of her own space  There was concern for other residents well-being since patient's hygiene is so poor and she is sharing food space as well  Patient's gait continues to be unsteady  Has had recurrent mechanical falls with no direct head trauma  Patient is supposed to use a cane but refuses to do so  Patient has followed up with her psychiatrist yesterday  Antipsychotics were adjusted but nothing was discontinued  The staff supervisors manager has requested a [address] be followed out by this provider  Review of Systems:     Review of Systems   Constitutional: Positive for activity change and appetite change  Negative for fever  Respiratory: Negative for shortness of breath  Gastrointestinal: Negative for abdominal pain  Musculoskeletal: Positive for gait problem  Psychiatric/Behavioral: Positive for confusion          Problem List:     Patient Active Problem List   Diagnosis    Anxiety    Depression    Hyperlipidemia    Esophageal reflux    [facility]oaffective disorder [facility] [address] [facility]    Chronic obstructive pulmonary disease ([facility] [address] [facility]    Encounter for gynecological examination without abnormal finding    Post-menopausal    Benzodiazepine dependence (HCC)    Confusion    Iron deficiency anemia secondary to inadequate dietary iron intake    Hypotension due to hypovolemia        Objective:     /68   Pulse 85   Temp 98 1 °F (36 7 °C)   Ht 5' 6" (1 676 m)   Wt 64 9 kg (143 lb)   SpO2 98%   BMI 23 08 kg/m²     Physical Exam  Constitutional:       Comments: Disheveled  HENT:      Head: Normocephalic and atraumatic  Right Ear: External ear normal       Left Ear: External ear normal    Eyes:      Conjunctiva/sclera: Conjunctivae normal    Cardiovascular:      Rate and Rhythm: Normal rate and regular rhythm  Heart sounds: No murmur heard  Pulmonary:      Effort: Pulmonary effort is normal       Breath sounds: Normal breath sounds  Skin:     General: Skin is dry  Neurological:      Mental Status: She is alert  Motor: Tremor present  Gait: Gait abnormal       Comments: Pt oriented to self only  Gait is slow and shuffled  Pertinent Laboratory/Diagnostic Studies:    Laboratory Results: I have personally reviewed the pertinent laboratory results/reports     Iron Studies:   Results from Last 12 Months   Lab Units 05/17/21  1029   IRON ug/dL 24*   TIBC ug/dL 400   FERRITIN ng/mL 6*       Radiology/Other Diagnostic Testing Results: I have personally reviewed pertinent reports          Anju CARRINGTON [facility]  8/19/2021 8:53 AM

## 2021-08-23 PROBLEM — F01.51 VASCULAR DEMENTIA WITH BEHAVIOR DISTURBANCE (HCC): Status: ACTIVE | Noted: 2021-08-23

## 2021-08-23 PROBLEM — F01.518 VASCULAR DEMENTIA WITH BEHAVIOR DISTURBANCE: Status: ACTIVE | Noted: 2021-08-23

## 2021-08-25 ENCOUNTER — OFFICE VISIT (OUTPATIENT)
Dept: GASTROENTEROLOGY | Facility: CLINIC | Age: 61
End: 2021-08-25
Payer: MEDICARE

## 2021-08-25 VITALS
DIASTOLIC BLOOD PRESSURE: 68 MMHG | HEIGHT: 64 IN | BODY MASS INDEX: 24.41 KG/M2 | SYSTOLIC BLOOD PRESSURE: 116 MMHG | WEIGHT: 143 LBS

## 2021-08-25 DIAGNOSIS — R15.9 INCONTINENCE OF FECES, UNSPECIFIED FECAL INCONTINENCE TYPE: Primary | ICD-10-CM

## 2021-08-25 PROCEDURE — 99203 OFFICE O/P NEW LOW 30 MIN: CPT | Performed by: PHYSICIAN ASSISTANT

## 2021-08-25 NOTE — PROGRESS NOTES
[facility] - Outpatient Consultation  Manisha Mann 57 y o  female MRN: [id number]  Encounter: [id number]          ASSESSMENT AND PLAN:      1  Incontinence of feces, unspecified fecal incontinence type  This seems to be behavioral as after having a BM in her brief she throws the dirty brief into the hallway at the [facility]  Agree the patient would likely benefit from a higher level of care    Her last colonoscopy in 2015 was normal except for internal hemorrhoids  She is due routinely for this in 2025    ______________________________________________________________________    HPI:  69-year-old female with a history of vascular dementia schizoaffective disorder who resides at [address] presents today for evaluation fecal incontinence  Per the patient's age who was with her today she has been having to wear a brief as she will not go to the toilet to have a bowel movement  Reportedly after she has a bowel movement she throws her dirty brief into the hallway  The stool is formed  There is no blood in the stool  She has a bowel movement every day  She is eating normally  She is not losing weight unexpectedly  She had a colonoscopy in 2015 that showed internal hemorrhoids  She has no personal history of colon polyps  She denies any family history of colon cancer  REVIEW OF SYSTEMS:    CONSTITUTIONAL: Denies any fever, chills, rigors, and weight loss  HEENT: No earache or tinnitus  Denies hearing loss or visual disturbances  CARDIOVASCULAR: No chest pain or palpitations  RESPIRATORY: Denies any cough, hemoptysis, shortness of breath or dyspnea on exertion  GASTROINTESTINAL: As noted in the History of Present Illness  GENITOURINARY: No problems with urination  Denies any hematuria or dysuria  NEUROLOGIC: No dizziness or vertigo, denies headaches  MUSCULOSKELETAL: Denies any muscle or joint pain  SKIN: Denies skin rashes or itching     ENDOCRINE: Denies excessive thirst  Denies intolerance to heat or cold  PSYCHOSOCIAL: Denies depression or anxiety  Denies any recent memory loss         Historical Information   Past Medical History:   Diagnosis Date    Abnormal weight loss     Anxiety     Benzodiazepine abuse ([address]     Depression     Facial cellulitis     Malaise     Nicotine dependence     Psychiatric disorder     Suicidal ideation      Past Surgical History:   Procedure Laterality Date    HIP SURGERY Right      Social History   Social History     Substance and Sexual Activity   Alcohol Use No     Social History     Substance and Sexual Activity   Drug Use No     Social History     Tobacco Use   Smoking Status Former Smoker    Packs/day: 0 50    Years: 20 00    Pack years: 10 00    Start date: 1977   Smokeless Tobacco Never Used   Tobacco Comment    current every day smoker per [employer]     Family History   Problem Relation Age of Onset    Diabetes Mother     Stroke Mother         Syndrome    Heart attack Father     Other Brother         Intellectual Disabilities    No Known Problems Sister     No Known Problems Maternal Grandmother     Breast cancer Paternal Grandmother 68    Breast cancer Maternal Aunt 39    No Known Problems Maternal Aunt     No Known Problems Maternal Aunt     Colon cancer Neg Hx     Ovarian cancer Neg Hx     Cervical cancer Neg Hx     Uterine cancer Neg Hx        Meds/Allergies       Current Outpatient Medications:     Aspirin 81 MG CAPS    artificial tear (LUBRIFRESH P M ) 83-15 % ophthalmic ointment    atorvastatin (LIPITOR) 10 mg tablet    benztropine (COGENTIN) 1 mg tablet    clonazePAM (KLONOPIN) 1 mg tablet    docusate sodium (COLACE) 100 mg capsule    ferrous sulfate 324 (65 Fe) mg    hydrOXYzine HCL (ATARAX) 25 mg tablet    magnesium oxide (MAG-OX) 400 mg tablet    melatonin 3 mg    oxybutynin (DITROPAN) 5 mg tablet    pantoprazole (PROTONIX) 40 mg tablet    sertraline (ZOLOFT) 100 mg tablet    SM Aspirin Adult Low Strength 81 MG EC tablet    tolnaftate (TINACTIN) 1 % cream    traZODone (DESYREL) 150 mg tablet    ziprasidone (GEODON) 40 mg capsule    Allergies   Allergen Reactions    Quetiapine     Penicillins Rash           Objective     Blood pressure 116/68, height 5' 4" (1 626 m), weight 64 9 kg (143 lb)  Body mass index is 24 55 kg/m²  PHYSICAL EXAM:      General Appearance:   Alert, cooperative, no distress   HEENT:   Normocephalic, atraumatic, anicteric      Neck:  Supple, symmetrical, trachea midline   Lungs:   Clear to auscultation bilaterally; no rales, rhonchi or wheezing; respirations unlabored    Heart[de-identified]   Regular rate and rhythm; no murmur, rub, or gallop  Abdomen:   Soft, non-tender, non-distended; normal bowel sounds; no masses, no organomegaly    Genitalia:   Deferred    Rectal:   Deferred    Extremities:  No cyanosis, clubbing or edema    Pulses:  2+ and symmetric    Skin:  No jaundice, rashes, or lesions    Lymph nodes:  No palpable cervical lymphadenopathy        Lab Results:   No visits with results within 1 Day(s) from this visit  Latest known visit with results is:   Appointment on 06/14/2021   Component Date Value    OCCULT BLD, FECAL IMMUNO* 06/14/2021 Negative          Radiology Results:   No results found

## 2021-09-29 ENCOUNTER — OFFICE VISIT (OUTPATIENT)
Dept: NEUROLOGY | Facility: CLINIC | Age: 61
End: 2021-09-29
Payer: MEDICARE

## 2021-09-29 VITALS
BODY MASS INDEX: 21.85 KG/M2 | DIASTOLIC BLOOD PRESSURE: 70 MMHG | HEART RATE: 83 BPM | WEIGHT: 128 LBS | HEIGHT: 64 IN | TEMPERATURE: 96.1 F | SYSTOLIC BLOOD PRESSURE: 98 MMHG

## 2021-09-29 DIAGNOSIS — E78.5 HYPERLIPIDEMIA, UNSPECIFIED HYPERLIPIDEMIA TYPE: ICD-10-CM

## 2021-09-29 DIAGNOSIS — R25.1 TREMOR: ICD-10-CM

## 2021-09-29 DIAGNOSIS — G30.0 EARLY ONSET ALZHEIMER'S DEMENTIA WITHOUT BEHAVIORAL DISTURBANCE (HCC): Primary | ICD-10-CM

## 2021-09-29 DIAGNOSIS — F41.9 ANXIETY: ICD-10-CM

## 2021-09-29 DIAGNOSIS — F02.80 EARLY ONSET ALZHEIMER'S DEMENTIA WITHOUT BEHAVIORAL DISTURBANCE (HCC): Primary | ICD-10-CM

## 2021-09-29 DIAGNOSIS — F25.1 SCHIZOAFFECTIVE DISORDER, DEPRESSIVE TYPE (HCC): ICD-10-CM

## 2021-09-29 PROCEDURE — 99214 OFFICE O/P EST MOD 30 MIN: CPT | Performed by: PSYCHIATRY & NEUROLOGY

## 2021-10-04 ENCOUNTER — TELEPHONE (OUTPATIENT)
Dept: INTERNAL MEDICINE CLINIC | Facility: CLINIC | Age: 61
End: 2021-10-04

## 2021-11-22 ENCOUNTER — OFFICE VISIT (OUTPATIENT)
Dept: INTERNAL MEDICINE CLINIC | Facility: CLINIC | Age: 61
End: 2021-11-22
Payer: MEDICARE

## 2021-11-22 VITALS
HEIGHT: 64 IN | WEIGHT: 112.2 LBS | HEART RATE: 104 BPM | DIASTOLIC BLOOD PRESSURE: 68 MMHG | TEMPERATURE: 97.7 F | SYSTOLIC BLOOD PRESSURE: 94 MMHG | BODY MASS INDEX: 19.15 KG/M2 | OXYGEN SATURATION: 93 %

## 2021-11-22 DIAGNOSIS — E86.1 HYPOTENSION DUE TO HYPOVOLEMIA: ICD-10-CM

## 2021-11-22 DIAGNOSIS — J44.9 CHRONIC OBSTRUCTIVE PULMONARY DISEASE, UNSPECIFIED COPD TYPE (HCC): Primary | ICD-10-CM

## 2021-11-22 DIAGNOSIS — I95.89 HYPOTENSION DUE TO HYPOVOLEMIA: ICD-10-CM

## 2021-11-22 DIAGNOSIS — H61.92 LESION OF SKIN OF LEFT EAR: ICD-10-CM

## 2021-11-22 DIAGNOSIS — F01.51 VASCULAR DEMENTIA WITH BEHAVIOR DISTURBANCE (HCC): ICD-10-CM

## 2021-11-22 DIAGNOSIS — L03.811 CELLULITIS OF HEAD EXCEPT FACE: ICD-10-CM

## 2021-11-22 DIAGNOSIS — F25.1 SCHIZOAFFECTIVE DISORDER, DEPRESSIVE TYPE (HCC): ICD-10-CM

## 2021-11-22 DIAGNOSIS — D50.8 IRON DEFICIENCY ANEMIA SECONDARY TO INADEQUATE DIETARY IRON INTAKE: ICD-10-CM

## 2021-11-22 PROCEDURE — 99214 OFFICE O/P EST MOD 30 MIN: CPT | Performed by: FAMILY MEDICINE

## 2021-11-22 RX ORDER — CEPHALEXIN 500 MG/1
500 CAPSULE ORAL EVERY 12 HOURS SCHEDULED
Qty: 14 CAPSULE | Refills: 0 | Status: SHIPPED | OUTPATIENT
Start: 2021-11-22 | End: 2021-11-29

## 2021-11-24 ENCOUNTER — TELEPHONE (OUTPATIENT)
Dept: INTERNAL MEDICINE CLINIC | Facility: CLINIC | Age: 61
End: 2021-11-24

## 2021-12-08 DIAGNOSIS — D50.8 IRON DEFICIENCY ANEMIA SECONDARY TO INADEQUATE DIETARY IRON INTAKE: ICD-10-CM

## 2021-12-08 RX ORDER — FERROUS SULFATE TAB EC 324 MG (65 MG FE EQUIVALENT) 324 (65 FE) MG
324 TABLET DELAYED RESPONSE ORAL
Qty: 90 TABLET | Refills: 0 | Status: SHIPPED | OUTPATIENT
Start: 2021-12-08 | End: 2022-01-17

## 2021-12-13 ENCOUNTER — CONSULT (OUTPATIENT)
Dept: DERMATOLOGY | Facility: CLINIC | Age: 61
End: 2021-12-13
Payer: MEDICARE

## 2021-12-13 VITALS — WEIGHT: 114 LBS | TEMPERATURE: 98.2 F | BODY MASS INDEX: 19.57 KG/M2

## 2021-12-13 DIAGNOSIS — H61.92 LESION OF SKIN OF LEFT EAR: ICD-10-CM

## 2021-12-13 DIAGNOSIS — H61.003 CHONDRODERMATITIS NODULARIS HELICIS OF BOTH EARS: Primary | ICD-10-CM

## 2021-12-13 PROCEDURE — 99203 OFFICE O/P NEW LOW 30 MIN: CPT | Performed by: DERMATOLOGY

## 2021-12-13 RX ORDER — ALCLOMETASONE DIPROPIONATE 0.5 MG/G
OINTMENT TOPICAL
Qty: 15 G | Refills: 0 | Status: SHIPPED | OUTPATIENT
Start: 2021-12-13

## 2022-01-01 ENCOUNTER — APPOINTMENT (OUTPATIENT)
Dept: RADIOLOGY | Facility: HOSPITAL | Age: 62
DRG: 208 | End: 2022-01-01
Payer: MEDICARE

## 2022-01-01 ENCOUNTER — TELEPHONE (OUTPATIENT)
Dept: NEUROLOGY | Facility: CLINIC | Age: 62
End: 2022-01-01

## 2022-01-01 ENCOUNTER — APPOINTMENT (INPATIENT)
Dept: NON INVASIVE DIAGNOSTICS | Facility: HOSPITAL | Age: 62
DRG: 208 | End: 2022-01-01
Payer: MEDICARE

## 2022-01-01 ENCOUNTER — HOSPITAL ENCOUNTER (INPATIENT)
Facility: HOSPITAL | Age: 62
LOS: 3 days | DRG: 208 | End: 2022-08-25
Attending: EMERGENCY MEDICINE | Admitting: INTERNAL MEDICINE
Payer: MEDICARE

## 2022-01-01 ENCOUNTER — APPOINTMENT (OUTPATIENT)
Dept: GASTROENTEROLOGY | Facility: HOSPITAL | Age: 62
DRG: 208 | End: 2022-01-01
Payer: MEDICARE

## 2022-01-01 ENCOUNTER — DOCUMENTATION (OUTPATIENT)
Dept: ICU | Facility: HOSPITAL | Age: 62
End: 2022-01-01

## 2022-01-01 ENCOUNTER — APPOINTMENT (EMERGENCY)
Dept: CT IMAGING | Facility: HOSPITAL | Age: 62
DRG: 208 | End: 2022-01-01
Payer: MEDICARE

## 2022-01-01 ENCOUNTER — HOSPITAL ENCOUNTER (EMERGENCY)
Facility: HOSPITAL | Age: 62
End: 2022-07-08
Attending: EMERGENCY MEDICINE
Payer: MEDICARE

## 2022-01-01 ENCOUNTER — APPOINTMENT (OUTPATIENT)
Dept: MRI IMAGING | Facility: HOSPITAL | Age: 62
DRG: 208 | End: 2022-01-01
Payer: MEDICARE

## 2022-01-01 ENCOUNTER — LAB REQUISITION (OUTPATIENT)
Dept: LAB | Facility: HOSPITAL | Age: 62
End: 2022-01-01
Payer: MEDICARE

## 2022-01-01 ENCOUNTER — APPOINTMENT (OUTPATIENT)
Dept: GASTROENTEROLOGY | Facility: HOSPITAL | Age: 62
DRG: 208 | End: 2022-01-01
Attending: INTERNAL MEDICINE
Payer: MEDICARE

## 2022-01-01 VITALS
HEART RATE: 72 BPM | OXYGEN SATURATION: 97 % | TEMPERATURE: 97.3 F | SYSTOLIC BLOOD PRESSURE: 109 MMHG | DIASTOLIC BLOOD PRESSURE: 75 MMHG | RESPIRATION RATE: 18 BRPM

## 2022-01-01 VITALS
HEIGHT: 62 IN | RESPIRATION RATE: 47 BRPM | SYSTOLIC BLOOD PRESSURE: 170 MMHG | WEIGHT: 109.35 LBS | TEMPERATURE: 98.6 F | BODY MASS INDEX: 20.12 KG/M2 | DIASTOLIC BLOOD PRESSURE: 81 MMHG | OXYGEN SATURATION: 97 % | HEART RATE: 86 BPM

## 2022-01-01 DIAGNOSIS — F32.A DEPRESSION: ICD-10-CM

## 2022-01-01 DIAGNOSIS — R44.0 AUDITORY HALLUCINATIONS: ICD-10-CM

## 2022-01-01 DIAGNOSIS — R65.21 SEPTIC SHOCK (HCC): ICD-10-CM

## 2022-01-01 DIAGNOSIS — T17.308A CHOKING, INITIAL ENCOUNTER: ICD-10-CM

## 2022-01-01 DIAGNOSIS — I46.9 CARDIAC ARREST (HCC): Primary | ICD-10-CM

## 2022-01-01 DIAGNOSIS — R45.851 SUICIDAL IDEATIONS: Primary | ICD-10-CM

## 2022-01-01 DIAGNOSIS — A41.9 SEPTIC SHOCK (HCC): ICD-10-CM

## 2022-01-01 DIAGNOSIS — E43 SEVERE PROTEIN-CALORIE MALNUTRITION (HCC): ICD-10-CM

## 2022-01-01 DIAGNOSIS — Z03.818 ENCOUNTER FOR OBSERVATION FOR SUSPECTED EXPOSURE TO OTHER BIOLOGICAL AGENTS RULED OUT: ICD-10-CM

## 2022-01-01 DIAGNOSIS — Z11.59 ENCOUNTER FOR SCREENING FOR OTHER VIRAL DISEASES: ICD-10-CM

## 2022-01-01 DIAGNOSIS — J96.01 ACUTE RESPIRATORY FAILURE WITH HYPOXIA (HCC): ICD-10-CM

## 2022-01-01 DIAGNOSIS — Z00.00 ENCOUNTER FOR GENERAL ADULT MEDICAL EXAMINATION WITHOUT ABNORMAL FINDINGS: ICD-10-CM

## 2022-01-01 LAB
25(OH)D3 SERPL-MCNC: 38.9 NG/ML (ref 30–100)
2HR DELTA HS TROPONIN: -8 NG/L
2HR DELTA HS TROPONIN: 0 NG/L
4HR DELTA HS TROPONIN: 4 NG/L
ALBUMIN SERPL BCP-MCNC: 3.3 G/DL (ref 3.5–5)
ALBUMIN SERPL BCP-MCNC: 3.5 G/DL (ref 3.5–5)
ALBUMIN SERPL BCP-MCNC: 3.6 G/DL (ref 3.5–5)
ALBUMIN SERPL BCP-MCNC: 3.7 G/DL (ref 3.5–5)
ALBUMIN SERPL BCP-MCNC: 4 G/DL (ref 3.5–5)
ALP SERPL-CCNC: 136 U/L (ref 46–116)
ALP SERPL-CCNC: 64 U/L (ref 34–104)
ALP SERPL-CCNC: 76 U/L (ref 34–104)
ALP SERPL-CCNC: 90 U/L (ref 34–104)
ALP SERPL-CCNC: 94 U/L (ref 34–104)
ALT SERPL W P-5'-P-CCNC: 12 U/L (ref 7–52)
ALT SERPL W P-5'-P-CCNC: 20 U/L (ref 7–52)
ALT SERPL W P-5'-P-CCNC: 22 U/L (ref 12–78)
ALT SERPL W P-5'-P-CCNC: 44 U/L (ref 7–52)
ALT SERPL W P-5'-P-CCNC: 9 U/L (ref 7–52)
AMMONIA PLAS-SCNC: 32 UMOL/L (ref 18–72)
AMMONIA PLAS-SCNC: 81 UMOL/L (ref 18–72)
AMPHETAMINES SERPL QL SCN: NEGATIVE
ANION GAP SERPL CALCULATED.3IONS-SCNC: 11 MMOL/L (ref 4–13)
ANION GAP SERPL CALCULATED.3IONS-SCNC: 11 MMOL/L (ref 4–13)
ANION GAP SERPL CALCULATED.3IONS-SCNC: 12 MMOL/L (ref 4–13)
ANION GAP SERPL CALCULATED.3IONS-SCNC: 12 MMOL/L (ref 4–13)
ANION GAP SERPL CALCULATED.3IONS-SCNC: 16 MMOL/L (ref 4–13)
ANION GAP SERPL CALCULATED.3IONS-SCNC: 21 MMOL/L (ref 4–13)
ANION GAP SERPL CALCULATED.3IONS-SCNC: 5 MMOL/L (ref 4–13)
ANION GAP SERPL CALCULATED.3IONS-SCNC: 8 MMOL/L (ref 4–13)
ANION GAP SERPL CALCULATED.3IONS-SCNC: 8 MMOL/L (ref 4–13)
ANION GAP SERPL CALCULATED.3IONS-SCNC: 9 MMOL/L (ref 4–13)
AORTIC ROOT: 2.9 CM
AORTIC VALVE MEAN VELOCITY: 6.8 M/S
APICAL FOUR CHAMBER EJECTION FRACTION: 53 %
APTT PPP: 30 SECONDS (ref 23–37)
ASCENDING AORTA: 2.7 CM
AST SERPL W P-5'-P-CCNC: 10 U/L (ref 13–39)
AST SERPL W P-5'-P-CCNC: 16 U/L (ref 5–45)
AST SERPL W P-5'-P-CCNC: 19 U/L (ref 13–39)
AST SERPL W P-5'-P-CCNC: 24 U/L (ref 13–39)
AST SERPL W P-5'-P-CCNC: 76 U/L (ref 13–39)
ATRIAL RATE: 104 BPM
ATRIAL RATE: 121 BPM
ATRIAL RATE: 38 BPM
ATRIAL RATE: 47 BPM
ATRIAL RATE: 65 BPM
AV LVOT MEAN GRADIENT: 1 MMHG
AV LVOT PEAK GRADIENT: 2 MMHG
AV MEAN GRADIENT: 2 MMHG
AV PEAK GRADIENT: 5 MMHG
AV VELOCITY RATIO: 0.7
BACTERIA BRONCH AEROBE CULT: ABNORMAL
BACTERIA UR QL AUTO: ABNORMAL /HPF
BARBITURATES UR QL: NEGATIVE
BASE EXCESS BLDA CALC-SCNC: -1.3 MMOL/L
BASE EXCESS BLDA CALC-SCNC: -17.4 MMOL/L
BASE EXCESS BLDA CALC-SCNC: -3.3 MMOL/L
BASE EXCESS BLDA CALC-SCNC: -6.5 MMOL/L
BASE EXCESS BLDA CALC-SCNC: 0.2 MMOL/L
BASE EXCESS BLDA CALC-SCNC: 0.4 MMOL/L
BASOPHILS # BLD AUTO: 0.04 THOUSANDS/ΜL (ref 0–0.1)
BASOPHILS # BLD AUTO: 0.06 THOUSANDS/ΜL (ref 0–0.1)
BASOPHILS # BLD AUTO: 0.08 THOUSANDS/ΜL (ref 0–0.1)
BASOPHILS # BLD MANUAL: 0 THOUSAND/UL (ref 0–0.1)
BASOPHILS NFR BLD AUTO: 1 % (ref 0–1)
BASOPHILS NFR MAR MANUAL: 0 % (ref 0–1)
BENZODIAZ UR QL: NEGATIVE
BILIRUB SERPL-MCNC: 0.22 MG/DL (ref 0.2–1)
BILIRUB SERPL-MCNC: 0.28 MG/DL (ref 0.2–1)
BILIRUB SERPL-MCNC: 0.4 MG/DL (ref 0.2–1)
BILIRUB SERPL-MCNC: 0.42 MG/DL (ref 0.2–1)
BILIRUB SERPL-MCNC: 0.47 MG/DL (ref 0.2–1)
BILIRUB UR QL STRIP: NEGATIVE
BILIRUB UR QL STRIP: NEGATIVE
BNP SERPL-MCNC: 215 PG/ML (ref 0–100)
BODY TEMPERATURE: 96.4 DEGREES FEHRENHEIT
BODY TEMPERATURE: 96.7 DEGREES FEHRENHEIT
BODY TEMPERATURE: 97.2 DEGREES FEHRENHEIT
BUN SERPL-MCNC: 10 MG/DL (ref 5–25)
BUN SERPL-MCNC: 11 MG/DL (ref 5–25)
BUN SERPL-MCNC: 11 MG/DL (ref 5–25)
BUN SERPL-MCNC: 12 MG/DL (ref 5–25)
BUN SERPL-MCNC: 13 MG/DL (ref 5–25)
BUN SERPL-MCNC: 7 MG/DL (ref 5–25)
BUN SERPL-MCNC: 7 MG/DL (ref 5–25)
BUN SERPL-MCNC: 8 MG/DL (ref 5–25)
BUN SERPL-MCNC: 9 MG/DL (ref 5–25)
BUN SERPL-MCNC: 9 MG/DL (ref 5–25)
CA-I BLD-SCNC: 1.01 MMOL/L (ref 1.12–1.32)
CA-I BLD-SCNC: 1.08 MMOL/L (ref 1.12–1.32)
CALCIUM ALBUM COR SERPL-MCNC: 8.6 MG/DL (ref 8.3–10.1)
CALCIUM SERPL-MCNC: 7.2 MG/DL (ref 8.4–10.2)
CALCIUM SERPL-MCNC: 7.5 MG/DL (ref 8.4–10.2)
CALCIUM SERPL-MCNC: 7.9 MG/DL (ref 8.4–10.2)
CALCIUM SERPL-MCNC: 8 MG/DL (ref 8.4–10.2)
CALCIUM SERPL-MCNC: 8.4 MG/DL (ref 8.4–10.2)
CALCIUM SERPL-MCNC: 8.6 MG/DL (ref 8.4–10.2)
CALCIUM SERPL-MCNC: 9 MG/DL (ref 8.4–10.2)
CALCIUM SERPL-MCNC: 9.1 MG/DL (ref 8.4–10.2)
CALCIUM SERPL-MCNC: 9.1 MG/DL (ref 8.4–10.2)
CALCIUM SERPL-MCNC: 9.5 MG/DL (ref 8.3–10.1)
CARDIAC TROPONIN I PNL SERPL HS: 33 NG/L
CARDIAC TROPONIN I PNL SERPL HS: 4 NG/L
CARDIAC TROPONIN I PNL SERPL HS: 4 NG/L
CARDIAC TROPONIN I PNL SERPL HS: 41 NG/L
CARDIAC TROPONIN I PNL SERPL HS: 45 NG/L
CHLORIDE SERPL-SCNC: 101 MMOL/L (ref 96–108)
CHLORIDE SERPL-SCNC: 101 MMOL/L (ref 96–108)
CHLORIDE SERPL-SCNC: 102 MMOL/L (ref 96–108)
CHLORIDE SERPL-SCNC: 104 MMOL/L (ref 96–108)
CHLORIDE SERPL-SCNC: 104 MMOL/L (ref 96–108)
CHLORIDE SERPL-SCNC: 106 MMOL/L (ref 96–108)
CHLORIDE SERPL-SCNC: 108 MMOL/L (ref 96–108)
CHLORIDE SERPL-SCNC: 109 MMOL/L (ref 100–108)
CHLORIDE SERPL-SCNC: 110 MMOL/L (ref 96–108)
CHLORIDE SERPL-SCNC: 96 MMOL/L (ref 96–108)
CHOLEST SERPL-MCNC: 127 MG/DL
CK MB SERPL-MCNC: 1.6 % (ref 0–2.5)
CK MB SERPL-MCNC: 2.9 NG/ML (ref 0.6–6.3)
CK SERPL-CCNC: 186 U/L (ref 26–192)
CLARITY UR: CLEAR
CLARITY UR: CLEAR
CO2 SERPL-SCNC: 15 MMOL/L (ref 21–32)
CO2 SERPL-SCNC: 17 MMOL/L (ref 21–32)
CO2 SERPL-SCNC: 18 MMOL/L (ref 21–32)
CO2 SERPL-SCNC: 19 MMOL/L (ref 21–32)
CO2 SERPL-SCNC: 19 MMOL/L (ref 21–32)
CO2 SERPL-SCNC: 23 MMOL/L (ref 21–32)
CO2 SERPL-SCNC: 23 MMOL/L (ref 21–32)
CO2 SERPL-SCNC: 24 MMOL/L (ref 21–32)
CO2 SERPL-SCNC: 24 MMOL/L (ref 21–32)
CO2 SERPL-SCNC: 31 MMOL/L (ref 21–32)
COCAINE UR QL: NEGATIVE
COLOR UR: YELLOW
COLOR UR: YELLOW
CREAT SERPL-MCNC: 0.4 MG/DL (ref 0.6–1.3)
CREAT SERPL-MCNC: 0.46 MG/DL (ref 0.6–1.3)
CREAT SERPL-MCNC: 0.56 MG/DL (ref 0.6–1.3)
CREAT SERPL-MCNC: 0.61 MG/DL (ref 0.6–1.3)
CREAT SERPL-MCNC: 0.62 MG/DL (ref 0.6–1.3)
CREAT SERPL-MCNC: 0.77 MG/DL (ref 0.6–1.3)
CREAT SERPL-MCNC: 0.77 MG/DL (ref 0.6–1.3)
CREAT SERPL-MCNC: 0.83 MG/DL (ref 0.6–1.3)
CREAT SERPL-MCNC: 0.85 MG/DL (ref 0.6–1.3)
CREAT SERPL-MCNC: 1.11 MG/DL (ref 0.6–1.3)
DOP CALC AO PEAK VEL: 1.08 M/S
DOP CALC AO VTI: 22.01 CM
DOP CALC LVOT PEAK VEL VTI: 16.65 CM
DOP CALC LVOT PEAK VEL: 0.76 M/S
EOSINOPHIL # BLD AUTO: 0.02 THOUSAND/ΜL (ref 0–0.61)
EOSINOPHIL # BLD AUTO: 0.06 THOUSAND/ΜL (ref 0–0.61)
EOSINOPHIL # BLD AUTO: 0.22 THOUSAND/ΜL (ref 0–0.61)
EOSINOPHIL # BLD MANUAL: 0 THOUSAND/UL (ref 0–0.4)
EOSINOPHIL NFR BLD AUTO: 0 % (ref 0–6)
EOSINOPHIL NFR BLD AUTO: 1 % (ref 0–6)
EOSINOPHIL NFR BLD AUTO: 3 % (ref 0–6)
EOSINOPHIL NFR BLD MANUAL: 0 % (ref 0–6)
ERYTHROCYTE [DISTWIDTH] IN BLOOD BY AUTOMATED COUNT: 13.4 % (ref 11.6–15.1)
ERYTHROCYTE [DISTWIDTH] IN BLOOD BY AUTOMATED COUNT: 14 % (ref 11.6–15.1)
ERYTHROCYTE [DISTWIDTH] IN BLOOD BY AUTOMATED COUNT: 14.4 % (ref 11.6–15.1)
ERYTHROCYTE [DISTWIDTH] IN BLOOD BY AUTOMATED COUNT: 15.5 % (ref 11.6–15.1)
ERYTHROCYTE [DISTWIDTH] IN BLOOD BY AUTOMATED COUNT: 15.7 % (ref 11.6–15.1)
ERYTHROCYTE [DISTWIDTH] IN BLOOD BY AUTOMATED COUNT: 15.9 % (ref 11.6–15.1)
ERYTHROCYTE [DISTWIDTH] IN BLOOD BY AUTOMATED COUNT: 16.5 % (ref 11.6–15.1)
EST. AVERAGE GLUCOSE BLD GHB EST-MCNC: 108 MG/DL
EST. AVERAGE GLUCOSE BLD GHB EST-MCNC: 94 MG/DL
ETHANOL SERPL-MCNC: <10 MG/DL
ETHANOL SERPL-MCNC: <10 MG/DL
FLUAV RNA RESP QL NAA+PROBE: NEGATIVE
FLUAV RNA RESP QL NAA+PROBE: NEGATIVE
FLUBV RNA RESP QL NAA+PROBE: NEGATIVE
FLUBV RNA RESP QL NAA+PROBE: NEGATIVE
FRACTIONAL SHORTENING: 29 % (ref 28–44)
GFR SERPL CREATININE-BSD FRML MDRD: 106 ML/MIN/1.73SQ M
GFR SERPL CREATININE-BSD FRML MDRD: 111 ML/MIN/1.73SQ M
GFR SERPL CREATININE-BSD FRML MDRD: 52 ML/MIN/1.73SQ M
GFR SERPL CREATININE-BSD FRML MDRD: 73 ML/MIN/1.73SQ M
GFR SERPL CREATININE-BSD FRML MDRD: 75 ML/MIN/1.73SQ M
GFR SERPL CREATININE-BSD FRML MDRD: 83 ML/MIN/1.73SQ M
GFR SERPL CREATININE-BSD FRML MDRD: 83 ML/MIN/1.73SQ M
GFR SERPL CREATININE-BSD FRML MDRD: 96 ML/MIN/1.73SQ M
GFR SERPL CREATININE-BSD FRML MDRD: 96 ML/MIN/1.73SQ M
GFR SERPL CREATININE-BSD FRML MDRD: 99 ML/MIN/1.73SQ M
GLUCOSE SERPL-MCNC: 109 MG/DL (ref 65–140)
GLUCOSE SERPL-MCNC: 111 MG/DL (ref 65–140)
GLUCOSE SERPL-MCNC: 112 MG/DL (ref 65–140)
GLUCOSE SERPL-MCNC: 113 MG/DL (ref 65–140)
GLUCOSE SERPL-MCNC: 114 MG/DL (ref 65–140)
GLUCOSE SERPL-MCNC: 119 MG/DL (ref 65–140)
GLUCOSE SERPL-MCNC: 120 MG/DL (ref 65–140)
GLUCOSE SERPL-MCNC: 121 MG/DL (ref 65–140)
GLUCOSE SERPL-MCNC: 136 MG/DL (ref 65–140)
GLUCOSE SERPL-MCNC: 139 MG/DL (ref 65–140)
GLUCOSE SERPL-MCNC: 162 MG/DL (ref 65–140)
GLUCOSE SERPL-MCNC: 171 MG/DL (ref 65–140)
GLUCOSE SERPL-MCNC: 173 MG/DL (ref 65–140)
GLUCOSE SERPL-MCNC: 176 MG/DL (ref 65–140)
GLUCOSE SERPL-MCNC: 181 MG/DL (ref 65–140)
GLUCOSE SERPL-MCNC: 188 MG/DL (ref 65–140)
GLUCOSE SERPL-MCNC: 190 MG/DL (ref 65–140)
GLUCOSE SERPL-MCNC: 242 MG/DL (ref 65–140)
GLUCOSE SERPL-MCNC: 246 MG/DL (ref 65–140)
GLUCOSE SERPL-MCNC: 285 MG/DL (ref 65–140)
GLUCOSE SERPL-MCNC: 329 MG/DL (ref 65–140)
GLUCOSE SERPL-MCNC: 80 MG/DL (ref 65–140)
GLUCOSE SERPL-MCNC: 82 MG/DL (ref 65–140)
GLUCOSE SERPL-MCNC: 84 MG/DL (ref 65–140)
GLUCOSE SERPL-MCNC: 86 MG/DL (ref 65–140)
GLUCOSE SERPL-MCNC: 93 MG/DL (ref 65–140)
GLUCOSE SERPL-MCNC: 94 MG/DL (ref 65–140)
GLUCOSE SERPL-MCNC: 96 MG/DL (ref 65–140)
GLUCOSE UR STRIP-MCNC: ABNORMAL MG/DL
GLUCOSE UR STRIP-MCNC: NEGATIVE MG/DL
GRAM STN SPEC: ABNORMAL
HBA1C MFR BLD: 4.9 %
HBA1C MFR BLD: 5.4 %
HCO3 BLDA-SCNC: 12.5 MMOL/L (ref 22–28)
HCO3 BLDA-SCNC: 16.2 MMOL/L (ref 22–28)
HCO3 BLDA-SCNC: 17.5 MMOL/L (ref 22–28)
HCO3 BLDA-SCNC: 19.4 MMOL/L (ref 22–28)
HCO3 BLDA-SCNC: 20 MMOL/L (ref 22–28)
HCO3 BLDA-SCNC: 20.2 MMOL/L (ref 22–28)
HCT VFR BLD AUTO: 38.4 % (ref 34.8–46.1)
HCT VFR BLD AUTO: 39.1 % (ref 34.8–46.1)
HCT VFR BLD AUTO: 40.5 % (ref 34.8–46.1)
HCT VFR BLD AUTO: 41.4 % (ref 34.8–46.1)
HCT VFR BLD AUTO: 43.4 % (ref 34.8–46.1)
HCT VFR BLD AUTO: 43.8 % (ref 34.8–46.1)
HCT VFR BLD AUTO: 49.5 % (ref 34.8–46.1)
HDLC SERPL-MCNC: 31 MG/DL
HGB BLD-MCNC: 12.4 G/DL (ref 11.5–15.4)
HGB BLD-MCNC: 12.6 G/DL (ref 11.5–15.4)
HGB BLD-MCNC: 12.8 G/DL (ref 11.5–15.4)
HGB BLD-MCNC: 12.9 G/DL (ref 11.5–15.4)
HGB BLD-MCNC: 13.3 G/DL (ref 11.5–15.4)
HGB BLD-MCNC: 14.2 G/DL (ref 11.5–15.4)
HGB BLD-MCNC: 16.1 G/DL (ref 11.5–15.4)
HGB UR QL STRIP.AUTO: ABNORMAL
HGB UR QL STRIP.AUTO: NEGATIVE
HOROWITZ INDEX BLDA+IHG-RTO: 100 MM[HG]
HOROWITZ INDEX BLDA+IHG-RTO: 40 MM[HG]
IMM GRANULOCYTES # BLD AUTO: 0.02 THOUSAND/UL (ref 0–0.2)
IMM GRANULOCYTES # BLD AUTO: 0.02 THOUSAND/UL (ref 0–0.2)
IMM GRANULOCYTES # BLD AUTO: 0.04 THOUSAND/UL (ref 0–0.2)
IMM GRANULOCYTES NFR BLD AUTO: 0 % (ref 0–2)
INR PPP: 1.29 (ref 0.84–1.19)
INR PPP: 1.46 (ref 0.84–1.19)
INTERVENTRICULAR SEPTUM IN DIASTOLE (PARASTERNAL SHORT AXIS VIEW): 0.7 CM
INTERVENTRICULAR SEPTUM: 0.7 CM (ref 0.6–1.1)
KETONES UR STRIP-MCNC: NEGATIVE MG/DL
KETONES UR STRIP-MCNC: NEGATIVE MG/DL
LAAS-AP4: 9.9 CM2
LACTATE SERPL-SCNC: 1.1 MMOL/L (ref 0.5–2)
LACTATE SERPL-SCNC: 13.2 MMOL/L (ref 0.5–2)
LACTATE SERPL-SCNC: 6 MMOL/L (ref 0.5–2)
LDLC SERPL CALC-MCNC: 74 MG/DL (ref 0–100)
LEFT ATRIUM SIZE: 3.9 CM
LEFT INTERNAL DIMENSION IN SYSTOLE: 3.5 CM (ref 2.1–4)
LEFT VENTRICULAR INTERNAL DIMENSION IN DIASTOLE: 4.9 CM (ref 3.5–6)
LEFT VENTRICULAR POSTERIOR WALL IN END DIASTOLE: 0.7 CM
LEFT VENTRICULAR STROKE VOLUME: 64 ML
LEUKOCYTE ESTERASE UR QL STRIP: ABNORMAL
LEUKOCYTE ESTERASE UR QL STRIP: NEGATIVE
LVSV (TEICH): 64 ML
LYMPHOCYTES # BLD AUTO: 1.26 THOUSANDS/ΜL (ref 0.6–4.47)
LYMPHOCYTES # BLD AUTO: 1.27 THOUSANDS/ΜL (ref 0.6–4.47)
LYMPHOCYTES # BLD AUTO: 2.67 THOUSANDS/ΜL (ref 0.6–4.47)
LYMPHOCYTES # BLD AUTO: 4.67 THOUSAND/UL (ref 0.6–4.47)
LYMPHOCYTES # BLD AUTO: 50 % (ref 14–44)
LYMPHOCYTES NFR BLD AUTO: 14 % (ref 14–44)
LYMPHOCYTES NFR BLD AUTO: 14 % (ref 14–44)
LYMPHOCYTES NFR BLD AUTO: 35 % (ref 14–44)
MAGNESIUM SERPL-MCNC: 1.8 MG/DL (ref 1.9–2.7)
MAGNESIUM SERPL-MCNC: 1.9 MG/DL (ref 1.9–2.7)
MAGNESIUM SERPL-MCNC: 1.9 MG/DL (ref 1.9–2.7)
MAGNESIUM SERPL-MCNC: 2.1 MG/DL (ref 1.9–2.7)
MAGNESIUM SERPL-MCNC: 2.1 MG/DL (ref 1.9–2.7)
MAGNESIUM SERPL-MCNC: 2.3 MG/DL (ref 1.9–2.7)
MCH RBC QN AUTO: 28.1 PG (ref 26.8–34.3)
MCH RBC QN AUTO: 28.3 PG (ref 26.8–34.3)
MCH RBC QN AUTO: 28.3 PG (ref 26.8–34.3)
MCH RBC QN AUTO: 28.5 PG (ref 26.8–34.3)
MCH RBC QN AUTO: 29.2 PG (ref 26.8–34.3)
MCHC RBC AUTO-ENTMCNC: 28.6 G/DL (ref 31.4–37.4)
MCHC RBC AUTO-ENTMCNC: 31.6 G/DL (ref 31.4–37.4)
MCHC RBC AUTO-ENTMCNC: 32.1 G/DL (ref 31.4–37.4)
MCHC RBC AUTO-ENTMCNC: 32.4 G/DL (ref 31.4–37.4)
MCHC RBC AUTO-ENTMCNC: 32.5 G/DL (ref 31.4–37.4)
MCHC RBC AUTO-ENTMCNC: 32.8 G/DL (ref 31.4–37.4)
MCHC RBC AUTO-ENTMCNC: 33 G/DL (ref 31.4–37.4)
MCV RBC AUTO: 100 FL (ref 82–98)
MCV RBC AUTO: 86 FL (ref 82–98)
MCV RBC AUTO: 87 FL (ref 82–98)
MCV RBC AUTO: 87 FL (ref 82–98)
MCV RBC AUTO: 89 FL (ref 82–98)
MCV RBC AUTO: 91 FL (ref 82–98)
MCV RBC AUTO: 92 FL (ref 82–98)
METHADONE UR QL: NEGATIVE
MONOCYTES # BLD AUTO: 0.38 THOUSAND/ΜL (ref 0.17–1.22)
MONOCYTES # BLD AUTO: 0.44 THOUSAND/ΜL (ref 0.17–1.22)
MONOCYTES # BLD AUTO: 0.47 THOUSAND/UL (ref 0–1.22)
MONOCYTES # BLD AUTO: 0.47 THOUSAND/ΜL (ref 0.17–1.22)
MONOCYTES NFR BLD AUTO: 4 % (ref 4–12)
MONOCYTES NFR BLD AUTO: 5 % (ref 4–12)
MONOCYTES NFR BLD AUTO: 6 % (ref 4–12)
MONOCYTES NFR BLD: 5 % (ref 4–12)
MRSA NOSE QL CULT: NORMAL
MV E'TISSUE VEL-SEP: 8 CM/S
NEUTROPHILS # BLD AUTO: 4.32 THOUSANDS/ΜL (ref 1.85–7.62)
NEUTROPHILS # BLD AUTO: 7.07 THOUSANDS/ΜL (ref 1.85–7.62)
NEUTROPHILS # BLD AUTO: 7.25 THOUSANDS/ΜL (ref 1.85–7.62)
NEUTROPHILS # BLD MANUAL: 4.2 THOUSAND/UL (ref 1.85–7.62)
NEUTS BAND NFR BLD MANUAL: 2 % (ref 0–8)
NEUTS SEG NFR BLD AUTO: 43 % (ref 43–75)
NEUTS SEG NFR BLD AUTO: 55 % (ref 43–75)
NEUTS SEG NFR BLD AUTO: 79 % (ref 43–75)
NEUTS SEG NFR BLD AUTO: 81 % (ref 43–75)
NITRITE UR QL STRIP: NEGATIVE
NITRITE UR QL STRIP: NEGATIVE
NON-SQ EPI CELLS URNS QL MICRO: ABNORMAL /HPF
NONHDLC SERPL-MCNC: 96 MG/DL
NRBC BLD AUTO-RTO: 0 /100 WBCS
O2 CT BLDA-SCNC: 18.7 ML/DL (ref 16–23)
O2 CT BLDA-SCNC: 19 ML/DL (ref 16–23)
O2 CT BLDA-SCNC: 19.2 ML/DL (ref 16–23)
O2 CT BLDA-SCNC: 19.2 ML/DL (ref 16–23)
O2 CT BLDA-SCNC: 19.3 ML/DL (ref 16–23)
O2 CT BLDA-SCNC: 22.5 ML/DL (ref 16–23)
OPIATES UR QL SCN: NEGATIVE
OSMOLALITY UR/SERPL-RTO: 292 MMOL/KG (ref 282–298)
OSMOLALITY UR: 451 MMOL/KG
OXYCODONE+OXYMORPHONE UR QL SCN: NEGATIVE
OXYHGB MFR BLDA: 97.1 % (ref 94–97)
OXYHGB MFR BLDA: 98.3 % (ref 94–97)
OXYHGB MFR BLDA: 98.5 % (ref 94–97)
OXYHGB MFR BLDA: 98.6 % (ref 94–97)
OXYHGB MFR BLDA: 98.7 % (ref 94–97)
OXYHGB MFR BLDA: 99 % (ref 94–97)
P AXIS: 50 DEGREES
P AXIS: 67 DEGREES
P AXIS: 69 DEGREES
P AXIS: 70 DEGREES
P AXIS: 77 DEGREES
P AXIS: 87 DEGREES
P AXIS: 90 DEGREES
PCO2 BLDA: 21.2 MM HG (ref 36–44)
PCO2 BLDA: 22 MM HG (ref 36–44)
PCO2 BLDA: 22.5 MM HG (ref 36–44)
PCO2 BLDA: 23.4 MM HG (ref 36–44)
PCO2 BLDA: 25.5 MM HG (ref 36–44)
PCO2 BLDA: 45.2 MM HG (ref 36–44)
PCP UR QL: NEGATIVE
PEEP RESPIRATORY: 6 CM[H2O]
PEEP RESPIRATORY: 6 CM[H2O]
PH BLDA: 7.06 [PH] (ref 7.35–7.45)
PH BLDA: 7.42 [PH] (ref 7.35–7.45)
PH BLDA: 7.51 [PH] (ref 7.35–7.45)
PH BLDA: 7.54 [PH] (ref 7.35–7.45)
PH BLDA: 7.58 [PH] (ref 7.35–7.45)
PH BLDA: 7.59 [PH] (ref 7.35–7.45)
PH UR STRIP.AUTO: 6 [PH]
PH UR STRIP.AUTO: 6.5 [PH]
PHOSPHATE SERPL-MCNC: 1.3 MG/DL (ref 2.3–4.1)
PHOSPHATE SERPL-MCNC: 1.7 MG/DL (ref 2.3–4.1)
PHOSPHATE SERPL-MCNC: 3.7 MG/DL (ref 2.3–4.1)
PHOSPHATE SERPL-MCNC: 4.1 MG/DL (ref 2.3–4.1)
PHOSPHATE SERPL-MCNC: 4.5 MG/DL (ref 2.3–4.1)
PHOSPHATE SERPL-MCNC: 8.7 MG/DL (ref 2.3–4.1)
PLATELET # BLD AUTO: 245 THOUSANDS/UL (ref 149–390)
PLATELET # BLD AUTO: 248 THOUSANDS/UL (ref 149–390)
PLATELET # BLD AUTO: 250 THOUSANDS/UL (ref 149–390)
PLATELET # BLD AUTO: 252 THOUSANDS/UL (ref 149–390)
PLATELET # BLD AUTO: 254 THOUSANDS/UL (ref 149–390)
PLATELET # BLD AUTO: 274 THOUSANDS/UL (ref 149–390)
PLATELET # BLD AUTO: 411 THOUSANDS/UL (ref 149–390)
PLATELET BLD QL SMEAR: ADEQUATE
PMV BLD AUTO: 10.1 FL (ref 8.9–12.7)
PMV BLD AUTO: 10.1 FL (ref 8.9–12.7)
PMV BLD AUTO: 10.2 FL (ref 8.9–12.7)
PMV BLD AUTO: 10.5 FL (ref 8.9–12.7)
PMV BLD AUTO: 10.7 FL (ref 8.9–12.7)
PMV BLD AUTO: 11 FL (ref 8.9–12.7)
PMV BLD AUTO: 11.1 FL (ref 8.9–12.7)
PO2 BLDA: 195.6 MM HG (ref 75–129)
PO2 BLDA: 206.3 MM HG (ref 75–129)
PO2 BLDA: 213.6 MM HG (ref 75–129)
PO2 BLDA: 215 MM HG (ref 75–129)
PO2 BLDA: 547.9 MM HG (ref 75–129)
PO2 BLDA: 572 MM HG (ref 75–129)
POTASSIUM SERPL-SCNC: 2.8 MMOL/L (ref 3.5–5.3)
POTASSIUM SERPL-SCNC: 3.4 MMOL/L (ref 3.5–5.3)
POTASSIUM SERPL-SCNC: 3.6 MMOL/L (ref 3.5–5.3)
POTASSIUM SERPL-SCNC: 3.8 MMOL/L (ref 3.5–5.3)
POTASSIUM SERPL-SCNC: 3.9 MMOL/L (ref 3.5–5.3)
POTASSIUM SERPL-SCNC: 4 MMOL/L (ref 3.5–5.3)
POTASSIUM SERPL-SCNC: 4 MMOL/L (ref 3.5–5.3)
POTASSIUM SERPL-SCNC: 4.2 MMOL/L (ref 3.5–5.3)
POTASSIUM SERPL-SCNC: 4.2 MMOL/L (ref 3.5–5.3)
POTASSIUM SERPL-SCNC: 4.3 MMOL/L (ref 3.5–5.3)
PR INTERVAL: 122 MS
PR INTERVAL: 138 MS
PR INTERVAL: 140 MS
PR INTERVAL: 150 MS
PR INTERVAL: 152 MS
PR INTERVAL: 158 MS
PR INTERVAL: 168 MS
PROCALCITONIN SERPL-MCNC: 4.03 NG/ML
PROCALCITONIN SERPL-MCNC: <0.05 NG/ML
PROT SERPL-MCNC: 5.5 G/DL (ref 6.4–8.4)
PROT SERPL-MCNC: 5.7 G/DL (ref 6.4–8.4)
PROT SERPL-MCNC: 6.4 G/DL (ref 6.4–8.4)
PROT SERPL-MCNC: 6.8 G/DL (ref 6.4–8.4)
PROT SERPL-MCNC: 8.1 G/DL (ref 6.4–8.2)
PROT UR STRIP-MCNC: ABNORMAL MG/DL
PROT UR STRIP-MCNC: NEGATIVE MG/DL
PROTHROMBIN TIME: 16 SECONDS (ref 11.6–14.5)
PROTHROMBIN TIME: 17.7 SECONDS (ref 11.6–14.5)
PS CM H2O: 6
PS VENT FIO2: 40
PS VENT PEEP: 6
QRS AXIS: -33 DEGREES
QRS AXIS: 53 DEGREES
QRS AXIS: 57 DEGREES
QRS AXIS: 63 DEGREES
QRS AXIS: 72 DEGREES
QRS AXIS: 75 DEGREES
QRS AXIS: 82 DEGREES
QRSD INTERVAL: 70 MS
QRSD INTERVAL: 72 MS
QRSD INTERVAL: 76 MS
QRSD INTERVAL: 84 MS
QRSD INTERVAL: 88 MS
QRSD INTERVAL: 88 MS
QRSD INTERVAL: 94 MS
QT INTERVAL: 284 MS
QT INTERVAL: 342 MS
QT INTERVAL: 408 MS
QT INTERVAL: 486 MS
QT INTERVAL: 568 MS
QT INTERVAL: 614 MS
QT INTERVAL: 646 MS
QTC INTERVAL: 386 MS
QTC INTERVAL: 404 MS
QTC INTERVAL: 424 MS
QTC INTERVAL: 449 MS
QTC INTERVAL: 502 MS
QTC INTERVAL: 543 MS
QTC INTERVAL: 571 MS
RBC # BLD AUTO: 4.31 MILLION/UL (ref 3.81–5.12)
RBC # BLD AUTO: 4.35 MILLION/UL (ref 3.81–5.12)
RBC # BLD AUTO: 4.39 MILLION/UL (ref 3.81–5.12)
RBC # BLD AUTO: 4.56 MILLION/UL (ref 3.81–5.12)
RBC # BLD AUTO: 4.56 MILLION/UL (ref 3.81–5.12)
RBC # BLD AUTO: 5.01 MILLION/UL (ref 3.81–5.12)
RBC # BLD AUTO: 5.72 MILLION/UL (ref 3.81–5.12)
RBC #/AREA URNS AUTO: ABNORMAL /HPF
RBC MORPH BLD: NORMAL
RIGHT VENTRICLE ID DIMENSION: 2.6 CM
RSV RNA RESP QL NAA+PROBE: NEGATIVE
RSV RNA RESP QL NAA+PROBE: NEGATIVE
SARS-COV-2 RNA RESP QL NAA+PROBE: NEGATIVE
SL CV LV EF: 50
SL CV PED ECHO LEFT VENTRICLE DIASTOLIC VOLUME (MOD BIPLANE) 2D: 113 ML
SL CV PED ECHO LEFT VENTRICLE SYSTOLIC VOLUME (MOD BIPLANE) 2D: 49 ML
SODIUM SERPL-SCNC: 130 MMOL/L (ref 135–147)
SODIUM SERPL-SCNC: 133 MMOL/L (ref 135–147)
SODIUM SERPL-SCNC: 136 MMOL/L (ref 135–147)
SODIUM SERPL-SCNC: 137 MMOL/L (ref 135–147)
SODIUM SERPL-SCNC: 137 MMOL/L (ref 135–147)
SODIUM SERPL-SCNC: 138 MMOL/L (ref 135–147)
SODIUM SERPL-SCNC: 138 MMOL/L (ref 135–147)
SODIUM SERPL-SCNC: 139 MMOL/L (ref 135–147)
SODIUM SERPL-SCNC: 139 MMOL/L (ref 135–147)
SODIUM SERPL-SCNC: 140 MMOL/L (ref 136–145)
SP GR UR STRIP.AUTO: 1.01 (ref 1–1.03)
SP GR UR STRIP.AUTO: 1.01 (ref 1–1.03)
SPECIMEN SOURCE: ABNORMAL
T WAVE AXIS: 263 DEGREES
T WAVE AXIS: 41 DEGREES
T WAVE AXIS: 58 DEGREES
T WAVE AXIS: 59 DEGREES
T WAVE AXIS: 61 DEGREES
T WAVE AXIS: 66 DEGREES
T WAVE AXIS: 71 DEGREES
T4 FREE SERPL-MCNC: 0.97 NG/DL (ref 0.76–1.46)
THC UR QL: NEGATIVE
TR MAX PG: 26 MMHG
TR PEAK VELOCITY: 2.6 M/S
TRICUSPID VALVE PEAK REGURGITATION VELOCITY: 2.57 M/S
TRIGL SERPL-MCNC: 111 MG/DL
TSH SERPL DL<=0.05 MIU/L-ACNC: 0.59 UIU/ML (ref 0.45–4.5)
TSH SERPL DL<=0.05 MIU/L-ACNC: 0.83 UIU/ML (ref 0.45–4.5)
UROBILINOGEN UR QL STRIP.AUTO: 0.2 E.U./DL
UROBILINOGEN UR QL STRIP.AUTO: 0.2 E.U./DL
VANCOMYCIN TROUGH SERPL-MCNC: 6.2 UG/ML (ref 10–20)
VENT - PS: ABNORMAL
VENT AC: 20
VENT AC: 33
VENTRICULAR RATE: 104 BPM
VENTRICULAR RATE: 122 BPM
VENTRICULAR RATE: 38 BPM
VENTRICULAR RATE: 47 BPM
VENTRICULAR RATE: 65 BPM
VIT B12 SERPL-MCNC: 1638 PG/ML (ref 100–900)
VT SETTING VENT: 340 ML
VT SETTING VENT: 350 ML
WBC # BLD AUTO: 19.08 THOUSAND/UL (ref 4.31–10.16)
WBC # BLD AUTO: 22.96 THOUSAND/UL (ref 4.31–10.16)
WBC # BLD AUTO: 23.76 THOUSAND/UL (ref 4.31–10.16)
WBC # BLD AUTO: 7.75 THOUSAND/UL (ref 4.31–10.16)
WBC # BLD AUTO: 8.79 THOUSAND/UL (ref 4.31–10.16)
WBC # BLD AUTO: 9.15 THOUSAND/UL (ref 4.31–10.16)
WBC # BLD AUTO: 9.34 THOUSAND/UL (ref 4.31–10.16)
WBC #/AREA URNS AUTO: ABNORMAL /HPF

## 2022-01-01 PROCEDURE — 99291 CRITICAL CARE FIRST HOUR: CPT | Performed by: INTERNAL MEDICINE

## 2022-01-01 PROCEDURE — 96368 THER/DIAG CONCURRENT INF: CPT

## 2022-01-01 PROCEDURE — G1004 CDSM NDSC: HCPCS

## 2022-01-01 PROCEDURE — 80202 ASSAY OF VANCOMYCIN: CPT | Performed by: NURSE PRACTITIONER

## 2022-01-01 PROCEDURE — 85610 PROTHROMBIN TIME: CPT | Performed by: NURSE PRACTITIONER

## 2022-01-01 PROCEDURE — 82550 ASSAY OF CK (CPK): CPT | Performed by: NURSE PRACTITIONER

## 2022-01-01 PROCEDURE — 84100 ASSAY OF PHOSPHORUS: CPT | Performed by: NURSE PRACTITIONER

## 2022-01-01 PROCEDURE — 83735 ASSAY OF MAGNESIUM: CPT | Performed by: PHYSICIAN ASSISTANT

## 2022-01-01 PROCEDURE — 0B9D8ZX DRAINAGE OF RIGHT MIDDLE LUNG LOBE, VIA NATURAL OR ARTIFICIAL OPENING ENDOSCOPIC, DIAGNOSTIC: ICD-10-PCS | Performed by: INTERNAL MEDICINE

## 2022-01-01 PROCEDURE — 96361 HYDRATE IV INFUSION ADD-ON: CPT

## 2022-01-01 PROCEDURE — 93005 ELECTROCARDIOGRAM TRACING: CPT

## 2022-01-01 PROCEDURE — 84484 ASSAY OF TROPONIN QUANT: CPT | Performed by: EMERGENCY MEDICINE

## 2022-01-01 PROCEDURE — 82077 ASSAY SPEC XCP UR&BREATH IA: CPT | Performed by: FAMILY MEDICINE

## 2022-01-01 PROCEDURE — 80053 COMPREHEN METABOLIC PANEL: CPT | Performed by: FAMILY MEDICINE

## 2022-01-01 PROCEDURE — 85027 COMPLETE CBC AUTOMATED: CPT | Performed by: PHYSICIAN ASSISTANT

## 2022-01-01 PROCEDURE — 87070 CULTURE OTHR SPECIMN AEROBIC: CPT | Performed by: INTERNAL MEDICINE

## 2022-01-01 PROCEDURE — 80048 BASIC METABOLIC PNL TOTAL CA: CPT | Performed by: PHYSICIAN ASSISTANT

## 2022-01-01 PROCEDURE — 80053 COMPREHEN METABOLIC PANEL: CPT | Performed by: NURSE PRACTITIONER

## 2022-01-01 PROCEDURE — 94003 VENT MGMT INPAT SUBQ DAY: CPT

## 2022-01-01 PROCEDURE — 06HY33Z INSERTION OF INFUSION DEVICE INTO LOWER VEIN, PERCUTANEOUS APPROACH: ICD-10-PCS | Performed by: EMERGENCY MEDICINE

## 2022-01-01 PROCEDURE — 84100 ASSAY OF PHOSPHORUS: CPT | Performed by: PHYSICIAN ASSISTANT

## 2022-01-01 PROCEDURE — 83605 ASSAY OF LACTIC ACID: CPT | Performed by: FAMILY MEDICINE

## 2022-01-01 PROCEDURE — 83930 ASSAY OF BLOOD OSMOLALITY: CPT | Performed by: PHYSICIAN ASSISTANT

## 2022-01-01 PROCEDURE — 94760 N-INVAS EAR/PLS OXIMETRY 1: CPT

## 2022-01-01 PROCEDURE — 0241U HB NFCT DS VIR RESP RNA 4 TRGT: CPT | Performed by: FAMILY MEDICINE

## 2022-01-01 PROCEDURE — 0D9670Z DRAINAGE OF STOMACH WITH DRAINAGE DEVICE, VIA NATURAL OR ARTIFICIAL OPENING: ICD-10-PCS | Performed by: EMERGENCY MEDICINE

## 2022-01-01 PROCEDURE — 80307 DRUG TEST PRSMV CHEM ANLYZR: CPT | Performed by: FAMILY MEDICINE

## 2022-01-01 PROCEDURE — 99292 CRITICAL CARE ADDL 30 MIN: CPT | Performed by: EMERGENCY MEDICINE

## 2022-01-01 PROCEDURE — 87106 FUNGI IDENTIFICATION YEAST: CPT | Performed by: INTERNAL MEDICINE

## 2022-01-01 PROCEDURE — 84484 ASSAY OF TROPONIN QUANT: CPT | Performed by: FAMILY MEDICINE

## 2022-01-01 PROCEDURE — 99291 CRITICAL CARE FIRST HOUR: CPT | Performed by: EMERGENCY MEDICINE

## 2022-01-01 PROCEDURE — 85025 COMPLETE CBC W/AUTO DIFF WBC: CPT | Performed by: EMERGENCY MEDICINE

## 2022-01-01 PROCEDURE — 82805 BLOOD GASES W/O2 SATURATION: CPT | Performed by: NURSE PRACTITIONER

## 2022-01-01 PROCEDURE — 36415 COLL VENOUS BLD VENIPUNCTURE: CPT | Performed by: EMERGENCY MEDICINE

## 2022-01-01 PROCEDURE — 83935 ASSAY OF URINE OSMOLALITY: CPT | Performed by: PHYSICIAN ASSISTANT

## 2022-01-01 PROCEDURE — 88305 TISSUE EXAM BY PATHOLOGIST: CPT | Performed by: PATHOLOGY

## 2022-01-01 PROCEDURE — 85610 PROTHROMBIN TIME: CPT | Performed by: FAMILY MEDICINE

## 2022-01-01 PROCEDURE — 84145 PROCALCITONIN (PCT): CPT | Performed by: NURSE PRACTITIONER

## 2022-01-01 PROCEDURE — 31500 INSERT EMERGENCY AIRWAY: CPT

## 2022-01-01 PROCEDURE — 82805 BLOOD GASES W/O2 SATURATION: CPT | Performed by: FAMILY MEDICINE

## 2022-01-01 PROCEDURE — 80061 LIPID PANEL: CPT | Performed by: INTERNAL MEDICINE

## 2022-01-01 PROCEDURE — 92950 HEART/LUNG RESUSCITATION CPR: CPT | Performed by: EMERGENCY MEDICINE

## 2022-01-01 PROCEDURE — 82607 VITAMIN B-12: CPT | Performed by: INTERNAL MEDICINE

## 2022-01-01 PROCEDURE — 96365 THER/PROPH/DIAG IV INF INIT: CPT

## 2022-01-01 PROCEDURE — 83880 ASSAY OF NATRIURETIC PEPTIDE: CPT | Performed by: FAMILY MEDICINE

## 2022-01-01 PROCEDURE — 85007 BL SMEAR W/DIFF WBC COUNT: CPT | Performed by: FAMILY MEDICINE

## 2022-01-01 PROCEDURE — 87040 BLOOD CULTURE FOR BACTERIA: CPT | Performed by: FAMILY MEDICINE

## 2022-01-01 PROCEDURE — 31645 BRNCHSC W/THER ASPIR 1ST: CPT | Performed by: INTERNAL MEDICINE

## 2022-01-01 PROCEDURE — 87081 CULTURE SCREEN ONLY: CPT | Performed by: NURSE PRACTITIONER

## 2022-01-01 PROCEDURE — 88112 CYTOPATH CELL ENHANCE TECH: CPT | Performed by: PATHOLOGY

## 2022-01-01 PROCEDURE — 84100 ASSAY OF PHOSPHORUS: CPT | Performed by: FAMILY MEDICINE

## 2022-01-01 PROCEDURE — NC001 PR NO CHARGE: Performed by: INTERNAL MEDICINE

## 2022-01-01 PROCEDURE — C9113 INJ PANTOPRAZOLE SODIUM, VIA: HCPCS | Performed by: NURSE PRACTITIONER

## 2022-01-01 PROCEDURE — 83036 HEMOGLOBIN GLYCOSYLATED A1C: CPT | Performed by: NURSE PRACTITIONER

## 2022-01-01 PROCEDURE — G0425 INPT/ED TELECONSULT30: HCPCS | Performed by: GENERAL PRACTICE

## 2022-01-01 PROCEDURE — 70551 MRI BRAIN STEM W/O DYE: CPT

## 2022-01-01 PROCEDURE — 82140 ASSAY OF AMMONIA: CPT | Performed by: NURSE PRACTITIONER

## 2022-01-01 PROCEDURE — 81001 URINALYSIS AUTO W/SCOPE: CPT | Performed by: FAMILY MEDICINE

## 2022-01-01 PROCEDURE — 99291 CRITICAL CARE FIRST HOUR: CPT | Performed by: PHYSICIAN ASSISTANT

## 2022-01-01 PROCEDURE — 5A1945Z RESPIRATORY VENTILATION, 24-96 CONSECUTIVE HOURS: ICD-10-PCS | Performed by: EMERGENCY MEDICINE

## 2022-01-01 PROCEDURE — 81003 URINALYSIS AUTO W/O SCOPE: CPT | Performed by: EMERGENCY MEDICINE

## 2022-01-01 PROCEDURE — 82330 ASSAY OF CALCIUM: CPT | Performed by: PHYSICIAN ASSISTANT

## 2022-01-01 PROCEDURE — 36556 INSERT NON-TUNNEL CV CATH: CPT | Performed by: EMERGENCY MEDICINE

## 2022-01-01 PROCEDURE — 82140 ASSAY OF AMMONIA: CPT | Performed by: FAMILY MEDICINE

## 2022-01-01 PROCEDURE — 99285 EMERGENCY DEPT VISIT HI MDM: CPT

## 2022-01-01 PROCEDURE — 93010 ELECTROCARDIOGRAM REPORT: CPT | Performed by: INTERNAL MEDICINE

## 2022-01-01 PROCEDURE — 85025 COMPLETE CBC W/AUTO DIFF WBC: CPT | Performed by: NURSE PRACTITIONER

## 2022-01-01 PROCEDURE — 96360 HYDRATION IV INFUSION INIT: CPT

## 2022-01-01 PROCEDURE — 83735 ASSAY OF MAGNESIUM: CPT | Performed by: NURSE PRACTITIONER

## 2022-01-01 PROCEDURE — 31624 DX BRONCHOSCOPE/LAVAGE: CPT | Performed by: INTERNAL MEDICINE

## 2022-01-01 PROCEDURE — 36415 COLL VENOUS BLD VENIPUNCTURE: CPT | Performed by: FAMILY MEDICINE

## 2022-01-01 PROCEDURE — 70450 CT HEAD/BRAIN W/O DYE: CPT

## 2022-01-01 PROCEDURE — 83605 ASSAY OF LACTIC ACID: CPT | Performed by: NURSE PRACTITIONER

## 2022-01-01 PROCEDURE — 84443 ASSAY THYROID STIM HORMONE: CPT | Performed by: EMERGENCY MEDICINE

## 2022-01-01 PROCEDURE — 99291 CRITICAL CARE FIRST HOUR: CPT | Performed by: NURSE PRACTITIONER

## 2022-01-01 PROCEDURE — 80053 COMPREHEN METABOLIC PANEL: CPT | Performed by: EMERGENCY MEDICINE

## 2022-01-01 PROCEDURE — 84439 ASSAY OF FREE THYROXINE: CPT | Performed by: INTERNAL MEDICINE

## 2022-01-01 PROCEDURE — 92950 HEART/LUNG RESUSCITATION CPR: CPT

## 2022-01-01 PROCEDURE — 85027 COMPLETE CBC AUTOMATED: CPT | Performed by: FAMILY MEDICINE

## 2022-01-01 PROCEDURE — 74177 CT ABD & PELVIS W/CONTRAST: CPT

## 2022-01-01 PROCEDURE — 93306 TTE W/DOPPLER COMPLETE: CPT | Performed by: INTERNAL MEDICINE

## 2022-01-01 PROCEDURE — 82948 REAGENT STRIP/BLOOD GLUCOSE: CPT

## 2022-01-01 PROCEDURE — 71260 CT THORAX DX C+: CPT

## 2022-01-01 PROCEDURE — 99284 EMERGENCY DEPT VISIT MOD MDM: CPT | Performed by: EMERGENCY MEDICINE

## 2022-01-01 PROCEDURE — 82077 ASSAY SPEC XCP UR&BREATH IA: CPT | Performed by: EMERGENCY MEDICINE

## 2022-01-01 PROCEDURE — 82553 CREATINE MB FRACTION: CPT | Performed by: NURSE PRACTITIONER

## 2022-01-01 PROCEDURE — 03HY32Z INSERTION OF MONITORING DEVICE INTO UPPER ARTERY, PERCUTANEOUS APPROACH: ICD-10-PCS | Performed by: EMERGENCY MEDICINE

## 2022-01-01 PROCEDURE — 85025 COMPLETE CBC W/AUTO DIFF WBC: CPT | Performed by: INTERNAL MEDICINE

## 2022-01-01 PROCEDURE — 83036 HEMOGLOBIN GLYCOSYLATED A1C: CPT | Performed by: INTERNAL MEDICINE

## 2022-01-01 PROCEDURE — 80048 BASIC METABOLIC PNL TOTAL CA: CPT | Performed by: NURSE PRACTITIONER

## 2022-01-01 PROCEDURE — 94002 VENT MGMT INPAT INIT DAY: CPT

## 2022-01-01 PROCEDURE — 3E033XZ INTRODUCTION OF VASOPRESSOR INTO PERIPHERAL VEIN, PERCUTANEOUS APPROACH: ICD-10-PCS | Performed by: EMERGENCY MEDICINE

## 2022-01-01 PROCEDURE — 36600 WITHDRAWAL OF ARTERIAL BLOOD: CPT

## 2022-01-01 PROCEDURE — 0241U HB NFCT DS VIR RESP RNA 4 TRGT: CPT | Performed by: EMERGENCY MEDICINE

## 2022-01-01 PROCEDURE — 71045 X-RAY EXAM CHEST 1 VIEW: CPT

## 2022-01-01 PROCEDURE — U0003 INFECTIOUS AGENT DETECTION BY NUCLEIC ACID (DNA OR RNA); SEVERE ACUTE RESPIRATORY SYNDROME CORONAVIRUS 2 (SARS-COV-2) (CORONAVIRUS DISEASE [COVID-19]), AMPLIFIED PROBE TECHNIQUE, MAKING USE OF HIGH THROUGHPUT TECHNOLOGIES AS DESCRIBED BY CMS-2020-01-R: HCPCS | Performed by: INTERNAL MEDICINE

## 2022-01-01 PROCEDURE — 89051 BODY FLUID CELL COUNT: CPT | Performed by: PATHOLOGY

## 2022-01-01 PROCEDURE — 5A12012 PERFORMANCE OF CARDIAC OUTPUT, SINGLE, MANUAL: ICD-10-PCS | Performed by: EMERGENCY MEDICINE

## 2022-01-01 PROCEDURE — U0005 INFEC AGEN DETEC AMPLI PROBE: HCPCS | Performed by: INTERNAL MEDICINE

## 2022-01-01 PROCEDURE — 80307 DRUG TEST PRSMV CHEM ANLYZR: CPT | Performed by: EMERGENCY MEDICINE

## 2022-01-01 PROCEDURE — NC001 PR NO CHARGE: Performed by: NURSE PRACTITIONER

## 2022-01-01 PROCEDURE — 87252 VIRUS INOCULATION TISSUE: CPT | Performed by: INTERNAL MEDICINE

## 2022-01-01 PROCEDURE — 0B21XEZ CHANGE ENDOTRACHEAL AIRWAY IN TRACHEA, EXTERNAL APPROACH: ICD-10-PCS | Performed by: INTERNAL MEDICINE

## 2022-01-01 PROCEDURE — 80053 COMPREHEN METABOLIC PANEL: CPT | Performed by: INTERNAL MEDICINE

## 2022-01-01 PROCEDURE — 84443 ASSAY THYROID STIM HORMONE: CPT | Performed by: INTERNAL MEDICINE

## 2022-01-01 PROCEDURE — G0425 INPT/ED TELECONSULT30: HCPCS | Performed by: PSYCHIATRY & NEUROLOGY

## 2022-01-01 PROCEDURE — 82805 BLOOD GASES W/O2 SATURATION: CPT | Performed by: PHYSICIAN ASSISTANT

## 2022-01-01 PROCEDURE — 31500 INSERT EMERGENCY AIRWAY: CPT | Performed by: INTERNAL MEDICINE

## 2022-01-01 PROCEDURE — 85730 THROMBOPLASTIN TIME PARTIAL: CPT | Performed by: NURSE PRACTITIONER

## 2022-01-01 PROCEDURE — 93306 TTE W/DOPPLER COMPLETE: CPT

## 2022-01-01 PROCEDURE — 82330 ASSAY OF CALCIUM: CPT | Performed by: NURSE PRACTITIONER

## 2022-01-01 PROCEDURE — 82306 VITAMIN D 25 HYDROXY: CPT | Performed by: INTERNAL MEDICINE

## 2022-01-01 PROCEDURE — 83735 ASSAY OF MAGNESIUM: CPT | Performed by: FAMILY MEDICINE

## 2022-01-01 RX ORDER — RISPERIDONE 0.25 MG/1
0.25 TABLET, ORALLY DISINTEGRATING ORAL
Status: CANCELLED | OUTPATIENT
Start: 2022-01-01

## 2022-01-01 RX ORDER — ACETAMINOPHEN 325 MG/1
975 TABLET ORAL EVERY 6 HOURS
Status: DISCONTINUED | OUTPATIENT
Start: 2022-01-01 | End: 2022-01-01

## 2022-01-01 RX ORDER — ACETAMINOPHEN 325 MG/1
650 TABLET ORAL ONCE
Status: COMPLETED | OUTPATIENT
Start: 2022-01-01 | End: 2022-01-01

## 2022-01-01 RX ORDER — FERROUS SULFATE 325(65) MG
325 TABLET ORAL
Status: CANCELLED | OUTPATIENT
Start: 2022-01-01

## 2022-01-01 RX ORDER — HYDROXYZINE 50 MG/1
25 TABLET, FILM COATED ORAL
Status: CANCELLED | OUTPATIENT
Start: 2022-01-01

## 2022-01-01 RX ORDER — SODIUM CHLORIDE, SODIUM GLUCONATE, SODIUM ACETATE, POTASSIUM CHLORIDE, MAGNESIUM CHLORIDE, SODIUM PHOSPHATE, DIBASIC, AND POTASSIUM PHOSPHATE .53; .5; .37; .037; .03; .012; .00082 G/100ML; G/100ML; G/100ML; G/100ML; G/100ML; G/100ML; G/100ML
500 INJECTION, SOLUTION INTRAVENOUS ONCE
Status: DISCONTINUED | OUTPATIENT
Start: 2022-01-01 | End: 2022-01-01

## 2022-01-01 RX ORDER — POLYETHYLENE GLYCOL 3350 17 G/17G
17 POWDER, FOR SOLUTION ORAL DAILY PRN
Status: DISCONTINUED | OUTPATIENT
Start: 2022-01-01 | End: 2022-01-01

## 2022-01-01 RX ORDER — NICOTINE 21 MG/24HR
21 PATCH, TRANSDERMAL 24 HOURS TRANSDERMAL ONCE
Status: COMPLETED | OUTPATIENT
Start: 2022-01-01 | End: 2022-01-01

## 2022-01-01 RX ORDER — PANTOPRAZOLE SODIUM 40 MG/10ML
40 INJECTION, POWDER, LYOPHILIZED, FOR SOLUTION INTRAVENOUS
Status: DISCONTINUED | OUTPATIENT
Start: 2022-01-01 | End: 2022-01-01

## 2022-01-01 RX ORDER — BENZTROPINE MESYLATE 0.5 MG/1
1 TABLET ORAL DAILY
Status: CANCELLED | OUTPATIENT
Start: 2022-01-01

## 2022-01-01 RX ORDER — ENOXAPARIN SODIUM 100 MG/ML
30 INJECTION SUBCUTANEOUS
Status: DISCONTINUED | OUTPATIENT
Start: 2022-01-01 | End: 2022-01-01

## 2022-01-01 RX ORDER — PROPOFOL 10 MG/ML
5-50 INJECTION, EMULSION INTRAVENOUS
Status: DISCONTINUED | OUTPATIENT
Start: 2022-01-01 | End: 2022-01-01

## 2022-01-01 RX ORDER — HYDRALAZINE HYDROCHLORIDE 20 MG/ML
10 INJECTION INTRAMUSCULAR; INTRAVENOUS EVERY 6 HOURS PRN
Status: DISCONTINUED | OUTPATIENT
Start: 2022-01-01 | End: 2022-01-01

## 2022-01-01 RX ORDER — PROPOFOL 10 MG/ML
INJECTION, EMULSION INTRAVENOUS
Status: COMPLETED
Start: 2022-01-01 | End: 2022-01-01

## 2022-01-01 RX ORDER — CEFEPIME HYDROCHLORIDE 2 G/50ML
2000 INJECTION, SOLUTION INTRAVENOUS EVERY 12 HOURS
Status: DISCONTINUED | OUTPATIENT
Start: 2022-01-01 | End: 2022-01-01

## 2022-01-01 RX ORDER — OXYBUTYNIN CHLORIDE 5 MG/1
5 TABLET ORAL 2 TIMES DAILY
Status: CANCELLED | OUTPATIENT
Start: 2022-01-01

## 2022-01-01 RX ORDER — FENTANYL CITRATE 50 UG/ML
50 INJECTION, SOLUTION INTRAMUSCULAR; INTRAVENOUS EVERY 2 HOUR PRN
Status: DISCONTINUED | OUTPATIENT
Start: 2022-01-01 | End: 2022-01-01

## 2022-01-01 RX ORDER — PANTOPRAZOLE SODIUM 40 MG/1
40 TABLET, DELAYED RELEASE ORAL EVERY MORNING
Status: DISCONTINUED | OUTPATIENT
Start: 2022-01-01 | End: 2022-01-01 | Stop reason: HOSPADM

## 2022-01-01 RX ORDER — HYDRALAZINE HYDROCHLORIDE 20 MG/ML
10 INJECTION INTRAMUSCULAR; INTRAVENOUS ONCE
Status: DISCONTINUED | OUTPATIENT
Start: 2022-01-01 | End: 2022-01-01

## 2022-01-01 RX ORDER — LORAZEPAM 2 MG/ML
1 INJECTION INTRAMUSCULAR
Status: DISCONTINUED | OUTPATIENT
Start: 2022-01-01 | End: 2022-01-01 | Stop reason: HOSPADM

## 2022-01-01 RX ORDER — POTASSIUM CHLORIDE 20MEQ/15ML
40 LIQUID (ML) ORAL ONCE
Status: COMPLETED | OUTPATIENT
Start: 2022-01-01 | End: 2022-01-01

## 2022-01-01 RX ORDER — ASPIRIN 81 MG/1
81 TABLET, CHEWABLE ORAL DAILY
Refills: 6 | Status: DISCONTINUED | OUTPATIENT
Start: 2022-01-01 | End: 2022-01-01 | Stop reason: HOSPADM

## 2022-01-01 RX ORDER — SODIUM CHLORIDE, SODIUM GLUCONATE, SODIUM ACETATE, POTASSIUM CHLORIDE, MAGNESIUM CHLORIDE, SODIUM PHOSPHATE, DIBASIC, AND POTASSIUM PHOSPHATE .53; .5; .37; .037; .03; .012; .00082 G/100ML; G/100ML; G/100ML; G/100ML; G/100ML; G/100ML; G/100ML
1000 INJECTION, SOLUTION INTRAVENOUS ONCE
Status: COMPLETED | OUTPATIENT
Start: 2022-01-01 | End: 2022-01-01

## 2022-01-01 RX ORDER — LABETALOL HYDROCHLORIDE 5 MG/ML
10 INJECTION, SOLUTION INTRAVENOUS ONCE
Status: DISCONTINUED | OUTPATIENT
Start: 2022-01-01 | End: 2022-01-01

## 2022-01-01 RX ORDER — NICOTINE 21 MG/24HR
14 PATCH, TRANSDERMAL 24 HOURS TRANSDERMAL ONCE
Status: COMPLETED | OUTPATIENT
Start: 2022-01-01 | End: 2022-01-01

## 2022-01-01 RX ORDER — VANCOMYCIN HYDROCHLORIDE 1 G/200ML
20 INJECTION, SOLUTION INTRAVENOUS ONCE
Status: COMPLETED | OUTPATIENT
Start: 2022-01-01 | End: 2022-01-01

## 2022-01-01 RX ORDER — ACETAMINOPHEN 160 MG/5ML
975 SUSPENSION, ORAL (FINAL DOSE FORM) ORAL EVERY 6 HOURS PRN
Status: DISCONTINUED | OUTPATIENT
Start: 2022-01-01 | End: 2022-01-01

## 2022-01-01 RX ORDER — AMOXICILLIN 250 MG
1 CAPSULE ORAL DAILY PRN
Status: CANCELLED | OUTPATIENT
Start: 2022-01-01

## 2022-01-01 RX ORDER — HALOPERIDOL 5 MG/ML
0.5 INJECTION INTRAMUSCULAR EVERY 2 HOUR PRN
Status: DISCONTINUED | OUTPATIENT
Start: 2022-01-01 | End: 2022-01-01 | Stop reason: HOSPADM

## 2022-01-01 RX ORDER — ACETAMINOPHEN 325 MG/1
975 TABLET ORAL EVERY 6 HOURS PRN
Status: CANCELLED | OUTPATIENT
Start: 2022-01-01

## 2022-01-01 RX ORDER — INSULIN LISPRO 100 [IU]/ML
1-5 INJECTION, SOLUTION INTRAVENOUS; SUBCUTANEOUS EVERY 6 HOURS SCHEDULED
Status: DISCONTINUED | OUTPATIENT
Start: 2022-01-01 | End: 2022-01-01

## 2022-01-01 RX ORDER — RISPERIDONE 1 MG/1
1 TABLET, FILM COATED ORAL ONCE
Status: COMPLETED | OUTPATIENT
Start: 2022-01-01 | End: 2022-01-01

## 2022-01-01 RX ORDER — VASOPRESSIN 20 U/ML
INJECTION PARENTERAL
Status: COMPLETED
Start: 2022-01-01 | End: 2022-01-01

## 2022-01-01 RX ORDER — EPINEPHRINE 0.1 MG/ML
SYRINGE (ML) INJECTION CODE/TRAUMA/SEDATION MEDICATION
Status: COMPLETED | OUTPATIENT
Start: 2022-01-01 | End: 2022-01-01

## 2022-01-01 RX ORDER — MAGNESIUM SULFATE 1 G/100ML
1 INJECTION INTRAVENOUS ONCE
Status: COMPLETED | OUTPATIENT
Start: 2022-01-01 | End: 2022-01-01

## 2022-01-01 RX ORDER — RISPERIDONE 0.25 MG/1
0.5 TABLET, FILM COATED ORAL
Status: CANCELLED | OUTPATIENT
Start: 2022-01-01

## 2022-01-01 RX ORDER — MAGNESIUM SULFATE HEPTAHYDRATE 40 MG/ML
2 INJECTION, SOLUTION INTRAVENOUS ONCE
Status: COMPLETED | OUTPATIENT
Start: 2022-01-01 | End: 2022-01-01

## 2022-01-01 RX ORDER — RISPERIDONE 0.5 MG/1
0.5 TABLET, ORALLY DISINTEGRATING ORAL
Status: CANCELLED | OUTPATIENT
Start: 2022-01-01

## 2022-01-01 RX ORDER — TRAZODONE HYDROCHLORIDE 50 MG/1
150 TABLET ORAL ONCE
Status: COMPLETED | OUTPATIENT
Start: 2022-01-01 | End: 2022-01-01

## 2022-01-01 RX ORDER — CHLORHEXIDINE GLUCONATE 0.12 MG/ML
15 RINSE ORAL EVERY 12 HOURS SCHEDULED
Status: DISCONTINUED | OUTPATIENT
Start: 2022-01-01 | End: 2022-01-01

## 2022-01-01 RX ORDER — FERROUS SULFATE 325(65) MG
325 TABLET ORAL
Refills: 4 | Status: DISCONTINUED | OUTPATIENT
Start: 2022-01-01 | End: 2022-01-01 | Stop reason: HOSPADM

## 2022-01-01 RX ORDER — ACETAMINOPHEN 325 MG/1
975 TABLET ORAL EVERY 8 HOURS SCHEDULED
Status: DISCONTINUED | OUTPATIENT
Start: 2022-01-01 | End: 2022-01-01

## 2022-01-01 RX ORDER — OXYBUTYNIN CHLORIDE 5 MG/1
5 TABLET ORAL 2 TIMES DAILY
Status: DISCONTINUED | OUTPATIENT
Start: 2022-01-01 | End: 2022-01-01 | Stop reason: HOSPADM

## 2022-01-01 RX ORDER — CALCIUM GLUCONATE 20 MG/ML
2 INJECTION, SOLUTION INTRAVENOUS ONCE
Status: COMPLETED | OUTPATIENT
Start: 2022-01-01 | End: 2022-01-01

## 2022-01-01 RX ORDER — PANTOPRAZOLE SODIUM 40 MG/1
40 TABLET, DELAYED RELEASE ORAL EVERY MORNING
Status: CANCELLED | OUTPATIENT
Start: 2022-01-01

## 2022-01-01 RX ORDER — HYDRALAZINE HYDROCHLORIDE 20 MG/ML
10 INJECTION INTRAMUSCULAR; INTRAVENOUS ONCE
Status: COMPLETED | OUTPATIENT
Start: 2022-01-01 | End: 2022-01-01

## 2022-01-01 RX ORDER — INSULIN LISPRO 100 [IU]/ML
1-6 INJECTION, SOLUTION INTRAVENOUS; SUBCUTANEOUS EVERY 6 HOURS SCHEDULED
Status: DISCONTINUED | OUTPATIENT
Start: 2022-01-01 | End: 2022-01-01

## 2022-01-01 RX ORDER — RISPERIDONE 0.5 MG/1
1 TABLET, ORALLY DISINTEGRATING ORAL
Status: CANCELLED | OUTPATIENT
Start: 2022-01-01

## 2022-01-01 RX ORDER — ACETAMINOPHEN 325 MG/1
650 TABLET ORAL EVERY 4 HOURS PRN
Status: CANCELLED | OUTPATIENT
Start: 2022-01-01

## 2022-01-01 RX ORDER — SODIUM CHLORIDE, SODIUM GLUCONATE, SODIUM ACETATE, POTASSIUM CHLORIDE, MAGNESIUM CHLORIDE, SODIUM PHOSPHATE, DIBASIC, AND POTASSIUM PHOSPHATE .53; .5; .37; .037; .03; .012; .00082 G/100ML; G/100ML; G/100ML; G/100ML; G/100ML; G/100ML; G/100ML
125 INJECTION, SOLUTION INTRAVENOUS CONTINUOUS
Status: DISCONTINUED | OUTPATIENT
Start: 2022-01-01 | End: 2022-01-01

## 2022-01-01 RX ORDER — CLONAZEPAM 0.5 MG/1
0.5 TABLET ORAL ONCE
Status: COMPLETED | OUTPATIENT
Start: 2022-01-01 | End: 2022-01-01

## 2022-01-01 RX ORDER — SODIUM CHLORIDE, SODIUM GLUCONATE, SODIUM ACETATE, POTASSIUM CHLORIDE, MAGNESIUM CHLORIDE, SODIUM PHOSPHATE, DIBASIC, AND POTASSIUM PHOSPHATE .53; .5; .37; .037; .03; .012; .00082 G/100ML; G/100ML; G/100ML; G/100ML; G/100ML; G/100ML; G/100ML
100 INJECTION, SOLUTION INTRAVENOUS CONTINUOUS
Status: DISCONTINUED | OUTPATIENT
Start: 2022-01-01 | End: 2022-01-01

## 2022-01-01 RX ORDER — ENOXAPARIN SODIUM 100 MG/ML
40 INJECTION SUBCUTANEOUS DAILY
Status: DISCONTINUED | OUTPATIENT
Start: 2022-01-01 | End: 2022-01-01

## 2022-01-01 RX ORDER — HALOPERIDOL 5 MG/ML
5 INJECTION INTRAMUSCULAR
Status: CANCELLED | OUTPATIENT
Start: 2022-01-01

## 2022-01-01 RX ORDER — POTASSIUM CHLORIDE 14.9 MG/ML
20 INJECTION INTRAVENOUS
Status: COMPLETED | OUTPATIENT
Start: 2022-01-01 | End: 2022-01-01

## 2022-01-01 RX ORDER — POTASSIUM CHLORIDE 29.8 MG/ML
40 INJECTION INTRAVENOUS
Status: COMPLETED | OUTPATIENT
Start: 2022-01-01 | End: 2022-01-01

## 2022-01-01 RX ORDER — GLYCOPYRROLATE 0.2 MG/ML
0.1 INJECTION INTRAMUSCULAR; INTRAVENOUS EVERY 4 HOURS PRN
Status: DISCONTINUED | OUTPATIENT
Start: 2022-01-01 | End: 2022-01-01 | Stop reason: HOSPADM

## 2022-01-01 RX ORDER — BUSPIRONE HYDROCHLORIDE 5 MG/1
15 TABLET ORAL EVERY 8 HOURS
Status: COMPLETED | OUTPATIENT
Start: 2022-01-01 | End: 2022-01-01

## 2022-01-01 RX ORDER — LORAZEPAM 1 MG/1
1 TABLET ORAL
Status: CANCELLED | OUTPATIENT
Start: 2022-01-01

## 2022-01-01 RX ORDER — AMOXICILLIN 250 MG
2 CAPSULE ORAL
Status: DISCONTINUED | OUTPATIENT
Start: 2022-01-01 | End: 2022-01-01

## 2022-01-01 RX ORDER — VANCOMYCIN HYDROCHLORIDE 1 G/200ML
20 INJECTION, SOLUTION INTRAVENOUS EVERY 24 HOURS
Status: DISCONTINUED | OUTPATIENT
Start: 2022-01-01 | End: 2022-01-01

## 2022-01-01 RX ORDER — LORAZEPAM 1 MG/1
0.5 TABLET ORAL ONCE
Status: COMPLETED | OUTPATIENT
Start: 2022-01-01 | End: 2022-01-01

## 2022-01-01 RX ORDER — ALPRAZOLAM 0.5 MG/1
0.5 TABLET ORAL ONCE
Status: COMPLETED | OUTPATIENT
Start: 2022-01-01 | End: 2022-01-01

## 2022-01-01 RX ORDER — LORAZEPAM 1 MG/1
1 TABLET ORAL ONCE
Status: COMPLETED | OUTPATIENT
Start: 2022-01-01 | End: 2022-01-01

## 2022-01-01 RX ORDER — LORAZEPAM 2 MG/ML
1 INJECTION INTRAMUSCULAR
Status: CANCELLED | OUTPATIENT
Start: 2022-01-01

## 2022-01-01 RX ORDER — LABETALOL HYDROCHLORIDE 5 MG/ML
10 INJECTION, SOLUTION INTRAVENOUS EVERY 6 HOURS PRN
Status: DISCONTINUED | OUTPATIENT
Start: 2022-01-01 | End: 2022-01-01

## 2022-01-01 RX ORDER — NICOTINE 21 MG/24HR
14 PATCH, TRANSDERMAL 24 HOURS TRANSDERMAL ONCE
Status: DISCONTINUED | OUTPATIENT
Start: 2022-01-01 | End: 2022-01-01 | Stop reason: HOSPADM

## 2022-01-01 RX ORDER — CLONAZEPAM 0.5 MG/1
1 TABLET ORAL 4 TIMES DAILY
Status: DISCONTINUED | OUTPATIENT
Start: 2022-01-01 | End: 2022-01-01 | Stop reason: HOSPADM

## 2022-01-01 RX ORDER — NICOTINE 21 MG/24HR
14 PATCH, TRANSDERMAL 24 HOURS TRANSDERMAL ONCE
Status: CANCELLED | OUTPATIENT
Start: 2022-01-01 | End: 2022-01-01

## 2022-01-01 RX ORDER — FENTANYL CITRATE 50 UG/ML
50 INJECTION, SOLUTION INTRAMUSCULAR; INTRAVENOUS ONCE
Status: COMPLETED | OUTPATIENT
Start: 2022-01-01 | End: 2022-01-01

## 2022-01-01 RX ORDER — ASPIRIN 81 MG/1
81 TABLET, CHEWABLE ORAL DAILY
Status: CANCELLED | OUTPATIENT
Start: 2022-01-01

## 2022-01-01 RX ORDER — LORAZEPAM 1 MG/1
0.5 TABLET ORAL
Status: CANCELLED | OUTPATIENT
Start: 2022-01-01

## 2022-01-01 RX ORDER — BENZTROPINE MESYLATE 0.5 MG/1
1 TABLET ORAL DAILY
Status: DISCONTINUED | OUTPATIENT
Start: 2022-01-01 | End: 2022-01-01 | Stop reason: HOSPADM

## 2022-01-01 RX ADMIN — CALCIUM GLUCONATE 2 G: 20 INJECTION, SOLUTION INTRAVENOUS at 05:21

## 2022-01-01 RX ADMIN — POTASSIUM PHOSPHATE, MONOBASIC AND POTASSIUM PHOSPHATE, DIBASIC 30 MMOL: 224; 236 INJECTION, SOLUTION, CONCENTRATE INTRAVENOUS at 07:44

## 2022-01-01 RX ADMIN — PANTOPRAZOLE SODIUM 40 MG: 40 INJECTION, POWDER, FOR SOLUTION INTRAVENOUS at 15:38

## 2022-01-01 RX ADMIN — ASPIRIN 81 MG CHEWABLE TABLET 81 MG: 81 TABLET CHEWABLE at 08:12

## 2022-01-01 RX ADMIN — MAGNESIUM OXIDE TAB 400 MG (241.3 MG ELEMENTAL MG) 400 MG: 400 (241.3 MG) TAB at 08:25

## 2022-01-01 RX ADMIN — BENZTROPINE MESYLATE 1 MG: 0.5 TABLET ORAL at 09:25

## 2022-01-01 RX ADMIN — FENTANYL CITRATE 50 MCG: 50 INJECTION, SOLUTION INTRAMUSCULAR; INTRAVENOUS at 15:37

## 2022-01-01 RX ADMIN — SODIUM BICARBONATE 50 MEQ: 84 INJECTION, SOLUTION INTRAVENOUS at 13:21

## 2022-01-01 RX ADMIN — LORAZEPAM 0.5 MG: 1 TABLET ORAL at 17:10

## 2022-01-01 RX ADMIN — TRAZODONE HYDROCHLORIDE 150 MG: 50 TABLET ORAL at 21:41

## 2022-01-01 RX ADMIN — CLONAZEPAM 1 MG: 0.5 TABLET ORAL at 13:43

## 2022-01-01 RX ADMIN — Medication 10 MG: at 12:03

## 2022-01-01 RX ADMIN — CLONAZEPAM 1 MG: 0.5 TABLET ORAL at 21:44

## 2022-01-01 RX ADMIN — NICOTINE 14 MG: 14 PATCH, EXTENDED RELEASE TRANSDERMAL at 09:05

## 2022-01-01 RX ADMIN — CHLORHEXIDINE GLUCONATE 0.12% ORAL RINSE 15 ML: 1.2 LIQUID ORAL at 08:01

## 2022-01-01 RX ADMIN — CLONAZEPAM 1 MG: 0.5 TABLET ORAL at 17:26

## 2022-01-01 RX ADMIN — PROPOFOL 5 MCG/KG/MIN: 10 INJECTION, EMULSION INTRAVENOUS at 16:47

## 2022-01-01 RX ADMIN — BUSPIRONE HYDROCHLORIDE 15 MG: 5 TABLET ORAL at 22:36

## 2022-01-01 RX ADMIN — ACETAMINOPHEN 975 MG: 650 SUSPENSION ORAL at 20:25

## 2022-01-01 RX ADMIN — LORAZEPAM 1 MG: 1 TABLET ORAL at 16:43

## 2022-01-01 RX ADMIN — ENOXAPARIN SODIUM 30 MG: 100 INJECTION SUBCUTANEOUS at 08:01

## 2022-01-01 RX ADMIN — BENZTROPINE MESYLATE 1 MG: 0.5 TABLET ORAL at 08:41

## 2022-01-01 RX ADMIN — ACETAMINOPHEN 975 MG: 325 TABLET ORAL at 13:41

## 2022-01-01 RX ADMIN — MAGNESIUM OXIDE TAB 400 MG (241.3 MG ELEMENTAL MG) 400 MG: 400 (241.3 MG) TAB at 08:12

## 2022-01-01 RX ADMIN — HYDRALAZINE HYDROCHLORIDE 10 MG: 20 INJECTION, SOLUTION INTRAMUSCULAR; INTRAVENOUS at 20:26

## 2022-01-01 RX ADMIN — INSULIN LISPRO 1 UNITS: 100 INJECTION, SOLUTION INTRAVENOUS; SUBCUTANEOUS at 00:09

## 2022-01-01 RX ADMIN — INSULIN LISPRO 1 UNITS: 100 INJECTION, SOLUTION INTRAVENOUS; SUBCUTANEOUS at 00:30

## 2022-01-01 RX ADMIN — VANCOMYCIN HYDROCHLORIDE 750 MG: 750 INJECTION, SOLUTION INTRAVENOUS at 22:55

## 2022-01-01 RX ADMIN — MORPHINE SULFATE 2 MG: 2 INJECTION, SOLUTION INTRAMUSCULAR; INTRAVENOUS at 10:59

## 2022-01-01 RX ADMIN — VANCOMYCIN HYDROCHLORIDE 1000 MG: 1 INJECTION, SOLUTION INTRAVENOUS at 10:36

## 2022-01-01 RX ADMIN — SODIUM CHLORIDE 4 UNITS/HR: 9 INJECTION, SOLUTION INTRAVENOUS at 15:54

## 2022-01-01 RX ADMIN — POTASSIUM CHLORIDE 40 MEQ: 29.8 INJECTION, SOLUTION INTRAVENOUS at 00:01

## 2022-01-01 RX ADMIN — CHLORHEXIDINE GLUCONATE 0.12% ORAL RINSE 15 ML: 1.2 LIQUID ORAL at 08:03

## 2022-01-01 RX ADMIN — CLONAZEPAM 1 MG: 0.5 TABLET ORAL at 12:22

## 2022-01-01 RX ADMIN — CHLORHEXIDINE GLUCONATE 0.12% ORAL RINSE 15 ML: 1.2 LIQUID ORAL at 20:07

## 2022-01-01 RX ADMIN — ENOXAPARIN SODIUM 30 MG: 100 INJECTION SUBCUTANEOUS at 08:03

## 2022-01-01 RX ADMIN — CLONAZEPAM 1 MG: 0.5 TABLET ORAL at 18:46

## 2022-01-01 RX ADMIN — CLONAZEPAM 1 MG: 0.5 TABLET ORAL at 09:25

## 2022-01-01 RX ADMIN — FERROUS SULFATE TAB 325 MG (65 MG ELEMENTAL FE) 325 MG: 325 (65 FE) TAB at 08:40

## 2022-01-01 RX ADMIN — SODIUM CHLORIDE 500 ML: 0.9 INJECTION, SOLUTION INTRAVENOUS at 00:12

## 2022-01-01 RX ADMIN — CEFEPIME HYDROCHLORIDE 2000 MG: 2 INJECTION, SOLUTION INTRAVENOUS at 02:30

## 2022-01-01 RX ADMIN — CLONAZEPAM 0.5 MG: 0.5 TABLET ORAL at 17:55

## 2022-01-01 RX ADMIN — MAGNESIUM OXIDE TAB 400 MG (241.3 MG ELEMENTAL MG) 400 MG: 400 (241.3 MG) TAB at 12:23

## 2022-01-01 RX ADMIN — INSULIN LISPRO 1 UNITS: 100 INJECTION, SOLUTION INTRAVENOUS; SUBCUTANEOUS at 06:16

## 2022-01-01 RX ADMIN — INSULIN LISPRO 1 UNITS: 100 INJECTION, SOLUTION INTRAVENOUS; SUBCUTANEOUS at 17:53

## 2022-01-01 RX ADMIN — SODIUM CHLORIDE, SODIUM GLUCONATE, SODIUM ACETATE, POTASSIUM CHLORIDE, MAGNESIUM CHLORIDE, SODIUM PHOSPHATE, DIBASIC, AND POTASSIUM PHOSPHATE 1000 ML: .53; .5; .37; .037; .03; .012; .00082 INJECTION, SOLUTION INTRAVENOUS at 18:11

## 2022-01-01 RX ADMIN — NOREPINEPHRINE BITARTRATE 20 MCG/MIN: 1 INJECTION, SOLUTION, CONCENTRATE INTRAVENOUS at 13:17

## 2022-01-01 RX ADMIN — ACETAMINOPHEN 650 MG: 325 TABLET ORAL at 16:51

## 2022-01-01 RX ADMIN — SERTRALINE HYDROCHLORIDE 150 MG: 50 TABLET ORAL at 09:25

## 2022-01-01 RX ADMIN — IOHEXOL 80 ML: 350 INJECTION, SOLUTION INTRAVENOUS at 14:41

## 2022-01-01 RX ADMIN — VASOPRESSIN 0.04 UNITS/MIN: 20 INJECTION INTRAVENOUS at 13:26

## 2022-01-01 RX ADMIN — OXYBUTYNIN CHLORIDE 5 MG: 5 TABLET ORAL at 08:41

## 2022-01-01 RX ADMIN — SERTRALINE HYDROCHLORIDE 150 MG: 50 TABLET ORAL at 22:08

## 2022-01-01 RX ADMIN — FENTANYL CITRATE 50 MCG: 50 INJECTION, SOLUTION INTRAMUSCULAR; INTRAVENOUS at 16:36

## 2022-01-01 RX ADMIN — ACETAMINOPHEN 325MG 975 MG: 325 TABLET ORAL at 21:59

## 2022-01-01 RX ADMIN — OXYBUTYNIN CHLORIDE 5 MG: 5 TABLET ORAL at 18:34

## 2022-01-01 RX ADMIN — POTASSIUM CHLORIDE 20 MEQ: 14.9 INJECTION, SOLUTION INTRAVENOUS at 07:51

## 2022-01-01 RX ADMIN — MAGNESIUM OXIDE TAB 400 MG (241.3 MG ELEMENTAL MG) 400 MG: 400 (241.3 MG) TAB at 08:40

## 2022-01-01 RX ADMIN — SERTRALINE HYDROCHLORIDE 150 MG: 50 TABLET ORAL at 08:24

## 2022-01-01 RX ADMIN — PANTOPRAZOLE SODIUM 40 MG: 40 TABLET, DELAYED RELEASE ORAL at 08:46

## 2022-01-01 RX ADMIN — ACETAMINOPHEN 650 MG: 325 TABLET ORAL at 16:43

## 2022-01-01 RX ADMIN — CEFEPIME HYDROCHLORIDE 2000 MG: 2 INJECTION, SOLUTION INTRAVENOUS at 02:47

## 2022-01-01 RX ADMIN — SERTRALINE HYDROCHLORIDE 150 MG: 50 TABLET ORAL at 22:35

## 2022-01-01 RX ADMIN — FERROUS SULFATE TAB 325 MG (65 MG ELEMENTAL FE) 325 MG: 325 (65 FE) TAB at 08:25

## 2022-01-01 RX ADMIN — PANTOPRAZOLE SODIUM 40 MG: 40 TABLET, DELAYED RELEASE ORAL at 09:25

## 2022-01-01 RX ADMIN — OXYBUTYNIN CHLORIDE 5 MG: 5 TABLET ORAL at 08:12

## 2022-01-01 RX ADMIN — NICOTINE 21 MG: 21 PATCH, EXTENDED RELEASE TRANSDERMAL at 14:11

## 2022-01-01 RX ADMIN — OXYBUTYNIN CHLORIDE 5 MG: 5 TABLET ORAL at 17:26

## 2022-01-01 RX ADMIN — SERTRALINE HYDROCHLORIDE 150 MG: 50 TABLET ORAL at 21:12

## 2022-01-01 RX ADMIN — FERROUS SULFATE TAB 325 MG (65 MG ELEMENTAL FE) 325 MG: 325 (65 FE) TAB at 12:23

## 2022-01-01 RX ADMIN — FERROUS SULFATE TAB 325 MG (65 MG ELEMENTAL FE) 325 MG: 325 (65 FE) TAB at 08:12

## 2022-01-01 RX ADMIN — MAGNESIUM OXIDE TAB 400 MG (241.3 MG ELEMENTAL MG) 400 MG: 400 (241.3 MG) TAB at 09:25

## 2022-01-01 RX ADMIN — CLONAZEPAM 1 MG: 0.5 TABLET ORAL at 08:13

## 2022-01-01 RX ADMIN — FERROUS SULFATE TAB 325 MG (65 MG ELEMENTAL FE) 325 MG: 325 (65 FE) TAB at 09:03

## 2022-01-01 RX ADMIN — VANCOMYCIN HYDROCHLORIDE 750 MG: 750 INJECTION, SOLUTION INTRAVENOUS at 11:17

## 2022-01-01 RX ADMIN — PROPOFOL 35 MCG/KG/MIN: 10 INJECTION, EMULSION INTRAVENOUS at 00:01

## 2022-01-01 RX ADMIN — OXYBUTYNIN CHLORIDE 5 MG: 5 TABLET ORAL at 17:02

## 2022-01-01 RX ADMIN — SODIUM CHLORIDE 1000 ML: 0.9 INJECTION, SOLUTION INTRAVENOUS at 14:10

## 2022-01-01 RX ADMIN — NOREPINEPHRINE BITARTRATE 10 MCG/MIN: 1 INJECTION, SOLUTION, CONCENTRATE INTRAVENOUS at 13:15

## 2022-01-01 RX ADMIN — PANTOPRAZOLE SODIUM 40 MG: 40 INJECTION, POWDER, FOR SOLUTION INTRAVENOUS at 08:04

## 2022-01-01 RX ADMIN — PROPOFOL 10 MCG/KG/MIN: 10 INJECTION, EMULSION INTRAVENOUS at 16:56

## 2022-01-01 RX ADMIN — VANCOMYCIN HYDROCHLORIDE 750 MG: 750 INJECTION, SOLUTION INTRAVENOUS at 23:35

## 2022-01-01 RX ADMIN — CEFEPIME HYDROCHLORIDE 2000 MG: 2 INJECTION, SOLUTION INTRAVENOUS at 15:38

## 2022-01-01 RX ADMIN — CEFEPIME HYDROCHLORIDE 2000 MG: 2 INJECTION, SOLUTION INTRAVENOUS at 03:33

## 2022-01-01 RX ADMIN — PANTOPRAZOLE SODIUM 40 MG: 40 INJECTION, POWDER, FOR SOLUTION INTRAVENOUS at 08:01

## 2022-01-01 RX ADMIN — CLONAZEPAM 1 MG: 0.5 TABLET ORAL at 09:03

## 2022-01-01 RX ADMIN — POTASSIUM CHLORIDE 40 MEQ: 20 SOLUTION ORAL at 22:24

## 2022-01-01 RX ADMIN — ACETAMINOPHEN 650 MG: 325 TABLET ORAL at 14:58

## 2022-01-01 RX ADMIN — BENZTROPINE MESYLATE 1 MG: 0.5 TABLET ORAL at 08:46

## 2022-01-01 RX ADMIN — CLONAZEPAM 1 MG: 0.5 TABLET ORAL at 13:07

## 2022-01-01 RX ADMIN — MAGNESIUM SULFATE HEPTAHYDRATE 2 G: 40 INJECTION, SOLUTION INTRAVENOUS at 06:20

## 2022-01-01 RX ADMIN — OXYBUTYNIN CHLORIDE 5 MG: 5 TABLET ORAL at 17:27

## 2022-01-01 RX ADMIN — MAGNESIUM SULFATE HEPTAHYDRATE 2 G: 40 INJECTION, SOLUTION INTRAVENOUS at 18:13

## 2022-01-01 RX ADMIN — OXYBUTYNIN CHLORIDE 5 MG: 5 TABLET ORAL at 09:03

## 2022-01-01 RX ADMIN — ACETAMINOPHEN 325MG 975 MG: 325 TABLET ORAL at 05:41

## 2022-01-01 RX ADMIN — CLONAZEPAM 0.5 MG: 0.5 TABLET ORAL at 12:27

## 2022-01-01 RX ADMIN — OXYBUTYNIN CHLORIDE 5 MG: 5 TABLET ORAL at 08:46

## 2022-01-01 RX ADMIN — PANTOPRAZOLE SODIUM 40 MG: 40 TABLET, DELAYED RELEASE ORAL at 08:25

## 2022-01-01 RX ADMIN — ASPIRIN 81 MG CHEWABLE TABLET 81 MG: 81 TABLET CHEWABLE at 09:02

## 2022-01-01 RX ADMIN — CLONAZEPAM 1 MG: 0.5 TABLET ORAL at 18:19

## 2022-01-01 RX ADMIN — ASPIRIN 81 MG CHEWABLE TABLET 81 MG: 81 TABLET CHEWABLE at 08:46

## 2022-01-01 RX ADMIN — SERTRALINE HYDROCHLORIDE 150 MG: 50 TABLET ORAL at 08:46

## 2022-01-01 RX ADMIN — HYDRALAZINE HYDROCHLORIDE 10 MG: 20 INJECTION INTRAMUSCULAR; INTRAVENOUS at 03:37

## 2022-01-01 RX ADMIN — CLONAZEPAM 1 MG: 0.5 TABLET ORAL at 11:03

## 2022-01-01 RX ADMIN — SERTRALINE HYDROCHLORIDE 150 MG: 50 TABLET ORAL at 21:44

## 2022-01-01 RX ADMIN — BENZTROPINE MESYLATE 1 MG: 0.5 TABLET ORAL at 08:12

## 2022-01-01 RX ADMIN — Medication 10 MG: at 18:01

## 2022-01-01 RX ADMIN — ASPIRIN 81 MG CHEWABLE TABLET 81 MG: 81 TABLET CHEWABLE at 08:40

## 2022-01-01 RX ADMIN — CEFEPIME HYDROCHLORIDE 2000 MG: 2 INJECTION, SOLUTION INTRAVENOUS at 14:15

## 2022-01-01 RX ADMIN — SODIUM CHLORIDE, SODIUM GLUCONATE, SODIUM ACETATE, POTASSIUM CHLORIDE, MAGNESIUM CHLORIDE, SODIUM PHOSPHATE, DIBASIC, AND POTASSIUM PHOSPHATE 125 ML/HR: .53; .5; .37; .037; .03; .012; .00082 INJECTION, SOLUTION INTRAVENOUS at 06:41

## 2022-01-01 RX ADMIN — LORAZEPAM 1 MG: 2 INJECTION INTRAMUSCULAR; INTRAVENOUS at 10:59

## 2022-01-01 RX ADMIN — OXYBUTYNIN CHLORIDE 5 MG: 5 TABLET ORAL at 09:25

## 2022-01-01 RX ADMIN — PANTOPRAZOLE SODIUM 40 MG: 40 TABLET, DELAYED RELEASE ORAL at 08:40

## 2022-01-01 RX ADMIN — CLONAZEPAM 1 MG: 0.5 TABLET ORAL at 18:34

## 2022-01-01 RX ADMIN — PROPOFOL 20 MCG/KG/MIN: 10 INJECTION, EMULSION INTRAVENOUS at 13:41

## 2022-01-01 RX ADMIN — CLONAZEPAM 1 MG: 0.5 TABLET ORAL at 12:38

## 2022-01-01 RX ADMIN — MAGNESIUM OXIDE TAB 400 MG (241.3 MG ELEMENTAL MG) 400 MG: 400 (241.3 MG) TAB at 09:02

## 2022-01-01 RX ADMIN — CLONAZEPAM 1 MG: 0.5 TABLET ORAL at 17:02

## 2022-01-01 RX ADMIN — NOREPINEPHRINE BITARTRATE 30 MCG/MIN: 1 INJECTION, SOLUTION, CONCENTRATE INTRAVENOUS at 13:22

## 2022-01-01 RX ADMIN — OXYBUTYNIN CHLORIDE 5 MG: 5 TABLET ORAL at 08:25

## 2022-01-01 RX ADMIN — SODIUM CHLORIDE, SODIUM GLUCONATE, SODIUM ACETATE, POTASSIUM CHLORIDE, MAGNESIUM CHLORIDE, SODIUM PHOSPHATE, DIBASIC, AND POTASSIUM PHOSPHATE 500 ML: .53; .5; .37; .037; .03; .012; .00082 INJECTION, SOLUTION INTRAVENOUS at 10:34

## 2022-01-01 RX ADMIN — NICOTINE 14 MG: 14 PATCH, EXTENDED RELEASE TRANSDERMAL at 11:04

## 2022-01-01 RX ADMIN — ACETAMINOPHEN 975 MG: 650 SUSPENSION ORAL at 04:53

## 2022-01-01 RX ADMIN — EPINEPHRINE 2 MCG/MIN: 1 INJECTION, SOLUTION, CONCENTRATE INTRAVENOUS at 08:51

## 2022-01-01 RX ADMIN — SODIUM CHLORIDE 1000 ML: 0.9 INJECTION, SOLUTION INTRAVENOUS at 21:59

## 2022-01-01 RX ADMIN — RISPERIDONE 1 MG: 1 TABLET ORAL at 17:55

## 2022-01-01 RX ADMIN — ASPIRIN 81 MG CHEWABLE TABLET 81 MG: 81 TABLET CHEWABLE at 09:25

## 2022-01-01 RX ADMIN — ACETAMINOPHEN 975 MG: 325 TABLET ORAL at 16:26

## 2022-01-01 RX ADMIN — CLONAZEPAM 1 MG: 0.5 TABLET ORAL at 17:27

## 2022-01-01 RX ADMIN — FERROUS SULFATE TAB 325 MG (65 MG ELEMENTAL FE) 325 MG: 325 (65 FE) TAB at 09:25

## 2022-01-01 RX ADMIN — POTASSIUM CHLORIDE 40 MEQ: 20 SOLUTION ORAL at 06:32

## 2022-01-01 RX ADMIN — CHLORHEXIDINE GLUCONATE 0.12% ORAL RINSE 15 ML: 1.2 LIQUID ORAL at 20:28

## 2022-01-01 RX ADMIN — CLONAZEPAM 1 MG: 0.5 TABLET ORAL at 21:12

## 2022-01-01 RX ADMIN — CALCIUM GLUCONATE 2 G: 20 INJECTION, SOLUTION INTRAVENOUS at 22:24

## 2022-01-01 RX ADMIN — CHLORHEXIDINE GLUCONATE 0.12% ORAL RINSE 15 ML: 1.2 LIQUID ORAL at 16:26

## 2022-01-01 RX ADMIN — PANTOPRAZOLE SODIUM 40 MG: 40 TABLET, DELAYED RELEASE ORAL at 09:03

## 2022-01-01 RX ADMIN — CEFEPIME HYDROCHLORIDE 2000 MG: 2 INJECTION, SOLUTION INTRAVENOUS at 14:01

## 2022-01-01 RX ADMIN — NICOTINE 21 MG: 21 PATCH, EXTENDED RELEASE TRANSDERMAL at 12:30

## 2022-01-01 RX ADMIN — SODIUM CHLORIDE, SODIUM GLUCONATE, SODIUM ACETATE, POTASSIUM CHLORIDE, MAGNESIUM CHLORIDE, SODIUM PHOSPHATE, DIBASIC, AND POTASSIUM PHOSPHATE 1000 ML: .53; .5; .37; .037; .03; .012; .00082 INJECTION, SOLUTION INTRAVENOUS at 16:07

## 2022-01-01 RX ADMIN — INSULIN LISPRO 1 UNITS: 100 INJECTION, SOLUTION INTRAVENOUS; SUBCUTANEOUS at 05:40

## 2022-01-01 RX ADMIN — SERTRALINE HYDROCHLORIDE 150 MG: 50 TABLET ORAL at 20:58

## 2022-01-01 RX ADMIN — ALPRAZOLAM 0.5 MG: 0.5 TABLET ORAL at 14:58

## 2022-01-01 RX ADMIN — ACETAMINOPHEN 975 MG: 325 TABLET ORAL at 07:53

## 2022-01-01 RX ADMIN — OXYBUTYNIN CHLORIDE 5 MG: 5 TABLET ORAL at 18:46

## 2022-01-01 RX ADMIN — CLONAZEPAM 1 MG: 0.5 TABLET ORAL at 08:40

## 2022-01-01 RX ADMIN — BUSPIRONE HYDROCHLORIDE 15 MG: 5 TABLET ORAL at 15:38

## 2022-01-01 RX ADMIN — CALCIUM GLUCONATE 2 G: 20 INJECTION, SOLUTION INTRAVENOUS at 06:32

## 2022-01-01 RX ADMIN — SODIUM CHLORIDE, SODIUM GLUCONATE, SODIUM ACETATE, POTASSIUM CHLORIDE, MAGNESIUM CHLORIDE, SODIUM PHOSPHATE, DIBASIC, AND POTASSIUM PHOSPHATE 125 ML/HR: .53; .5; .37; .037; .03; .012; .00082 INJECTION, SOLUTION INTRAVENOUS at 13:41

## 2022-01-01 RX ADMIN — CHLORHEXIDINE GLUCONATE 0.12% ORAL RINSE 15 ML: 1.2 LIQUID ORAL at 20:36

## 2022-01-01 RX ADMIN — ACETAMINOPHEN 975 MG: 325 TABLET ORAL at 02:30

## 2022-01-01 RX ADMIN — TRAZODONE HYDROCHLORIDE 150 MG: 50 TABLET ORAL at 22:25

## 2022-01-01 RX ADMIN — POTASSIUM PHOSPHATE, MONOBASIC AND POTASSIUM PHOSPHATE, DIBASIC 30 MMOL: 224; 236 INJECTION, SOLUTION, CONCENTRATE INTRAVENOUS at 06:50

## 2022-01-01 RX ADMIN — MAGNESIUM SULFATE HEPTAHYDRATE 2 G: 40 INJECTION, SOLUTION INTRAVENOUS at 13:40

## 2022-01-01 RX ADMIN — SODIUM CHLORIDE 1000 ML: 0.9 INJECTION, SOLUTION INTRAVENOUS at 13:22

## 2022-01-01 RX ADMIN — CLONAZEPAM 1 MG: 0.5 TABLET ORAL at 12:23

## 2022-01-01 RX ADMIN — POTASSIUM CHLORIDE 20 MEQ: 14.9 INJECTION, SOLUTION INTRAVENOUS at 06:24

## 2022-01-01 RX ADMIN — POTASSIUM CHLORIDE 40 MEQ: 20 SOLUTION ORAL at 06:25

## 2022-01-01 RX ADMIN — SERTRALINE HYDROCHLORIDE 150 MG: 50 TABLET ORAL at 08:40

## 2022-01-01 RX ADMIN — CLONAZEPAM 1 MG: 0.5 TABLET ORAL at 22:36

## 2022-01-01 RX ADMIN — INSULIN LISPRO 1 UNITS: 100 INJECTION, SOLUTION INTRAVENOUS; SUBCUTANEOUS at 11:48

## 2022-01-01 RX ADMIN — CLONAZEPAM 1 MG: 0.5 TABLET ORAL at 21:00

## 2022-01-01 RX ADMIN — ACETAMINOPHEN 975 MG: 325 TABLET ORAL at 20:07

## 2022-01-01 RX ADMIN — CLONAZEPAM 1 MG: 0.5 TABLET ORAL at 08:46

## 2022-01-01 RX ADMIN — PANTOPRAZOLE SODIUM 40 MG: 40 TABLET, DELAYED RELEASE ORAL at 08:12

## 2022-01-01 RX ADMIN — SODIUM CHLORIDE 1000 ML: 0.9 INJECTION, SOLUTION INTRAVENOUS at 00:13

## 2022-01-01 RX ADMIN — BENZTROPINE MESYLATE 1 MG: 0.5 TABLET ORAL at 08:25

## 2022-01-01 RX ADMIN — PIPERACILLIN AND TAZOBACTAM 3.38 G: 3; .375 INJECTION, POWDER, FOR SOLUTION INTRAVENOUS at 13:38

## 2022-01-01 RX ADMIN — CLONAZEPAM 1 MG: 0.5 TABLET ORAL at 08:25

## 2022-01-01 RX ADMIN — LORAZEPAM 0.5 MG: 1 TABLET ORAL at 17:18

## 2022-01-01 RX ADMIN — CLONAZEPAM 1 MG: 0.5 TABLET ORAL at 22:08

## 2022-01-01 RX ADMIN — SERTRALINE HYDROCHLORIDE 150 MG: 50 TABLET ORAL at 09:02

## 2022-01-01 RX ADMIN — SODIUM CHLORIDE, SODIUM GLUCONATE, SODIUM ACETATE, POTASSIUM CHLORIDE, MAGNESIUM CHLORIDE, SODIUM PHOSPHATE, DIBASIC, AND POTASSIUM PHOSPHATE 125 ML/HR: .53; .5; .37; .037; .03; .012; .00082 INJECTION, SOLUTION INTRAVENOUS at 16:26

## 2022-01-01 RX ADMIN — ASPIRIN 81 MG CHEWABLE TABLET 81 MG: 81 TABLET CHEWABLE at 08:25

## 2022-01-01 RX ADMIN — POTASSIUM CHLORIDE 40 MEQ: 29.8 INJECTION, SOLUTION INTRAVENOUS at 22:24

## 2022-01-01 RX ADMIN — SERTRALINE HYDROCHLORIDE 150 MG: 50 TABLET ORAL at 08:12

## 2022-01-01 RX ADMIN — MAGNESIUM SULFATE IN DEXTROSE 1 G: 10 INJECTION, SOLUTION INTRAVENOUS at 07:37

## 2022-01-01 RX ADMIN — BUSPIRONE HYDROCHLORIDE 15 MG: 5 TABLET ORAL at 07:52

## 2022-01-01 RX ADMIN — ACETAMINOPHEN 650 MG: 325 TABLET ORAL at 15:48

## 2022-01-01 RX ADMIN — OXYBUTYNIN CHLORIDE 5 MG: 5 TABLET ORAL at 18:21

## 2022-01-01 RX ADMIN — EPINEPHRINE 1 MG: 0.1 INJECTION INTRACARDIAC; INTRAVENOUS at 13:23

## 2022-01-17 DIAGNOSIS — D50.8 IRON DEFICIENCY ANEMIA SECONDARY TO INADEQUATE DIETARY IRON INTAKE: ICD-10-CM

## 2022-01-17 RX ORDER — FERROUS SULFATE TAB EC 324 MG (65 MG FE EQUIVALENT) 324 (65 FE) MG
TABLET DELAYED RESPONSE ORAL
Qty: 28 TABLET | Refills: 4 | Status: SHIPPED | OUTPATIENT
Start: 2022-01-17

## 2022-02-04 DIAGNOSIS — K59.00 CONSTIPATION, UNSPECIFIED CONSTIPATION TYPE: ICD-10-CM

## 2022-02-04 DIAGNOSIS — R32 URINARY INCONTINENCE, UNSPECIFIED TYPE: ICD-10-CM

## 2022-02-04 DIAGNOSIS — E86.1 HYPOTENSION DUE TO HYPOVOLEMIA: Primary | ICD-10-CM

## 2022-02-04 DIAGNOSIS — K21.9 GASTROESOPHAGEAL REFLUX DISEASE: ICD-10-CM

## 2022-02-04 DIAGNOSIS — E78.5 HYPERLIPIDEMIA, UNSPECIFIED HYPERLIPIDEMIA TYPE: ICD-10-CM

## 2022-02-04 DIAGNOSIS — I95.89 HYPOTENSION DUE TO HYPOVOLEMIA: Primary | ICD-10-CM

## 2022-02-04 RX ORDER — OXYBUTYNIN CHLORIDE 5 MG/1
TABLET ORAL
Qty: 56 TABLET | Refills: 4 | Status: SHIPPED | OUTPATIENT
Start: 2022-02-04

## 2022-02-04 RX ORDER — MAGNESIUM OXIDE 400 MG/1
TABLET ORAL
Qty: 56 TABLET | Refills: 4 | Status: SHIPPED | OUTPATIENT
Start: 2022-02-04

## 2022-02-04 RX ORDER — ATORVASTATIN CALCIUM 10 MG/1
10 TABLET, FILM COATED ORAL
Qty: 28 TABLET | Refills: 4 | Status: SHIPPED | OUTPATIENT
Start: 2022-02-04

## 2022-02-04 RX ORDER — PANTOPRAZOLE SODIUM 40 MG/1
40 TABLET, DELAYED RELEASE ORAL EVERY MORNING
Qty: 28 TABLET | Refills: 4 | Status: SHIPPED | OUTPATIENT
Start: 2022-02-04

## 2022-02-04 RX ORDER — DOCUSATE SODIUM 100 MG/1
100 CAPSULE, LIQUID FILLED ORAL 2 TIMES DAILY PRN
Qty: 28 CAPSULE | Refills: 4 | Status: SHIPPED | OUTPATIENT
Start: 2022-02-04 | End: 2022-03-06

## 2022-02-14 ENCOUNTER — OFFICE VISIT (OUTPATIENT)
Dept: INTERNAL MEDICINE CLINIC | Facility: CLINIC | Age: 62
End: 2022-02-14
Payer: MEDICARE

## 2022-02-14 ENCOUNTER — APPOINTMENT (OUTPATIENT)
Dept: LAB | Facility: CLINIC | Age: 62
End: 2022-02-14
Payer: MEDICARE

## 2022-02-14 VITALS
DIASTOLIC BLOOD PRESSURE: 60 MMHG | WEIGHT: 98.6 LBS | BODY MASS INDEX: 16.83 KG/M2 | HEIGHT: 64 IN | SYSTOLIC BLOOD PRESSURE: 100 MMHG | TEMPERATURE: 98.9 F

## 2022-02-14 DIAGNOSIS — F01.51 VASCULAR DEMENTIA WITH BEHAVIOR DISTURBANCE (HCC): ICD-10-CM

## 2022-02-14 DIAGNOSIS — E77.8 HYPOPROTEINEMIA (HCC): ICD-10-CM

## 2022-02-14 DIAGNOSIS — J44.9 CHRONIC OBSTRUCTIVE PULMONARY DISEASE, UNSPECIFIED COPD TYPE (HCC): ICD-10-CM

## 2022-02-14 DIAGNOSIS — E43 SEVERE PROTEIN-CALORIE MALNUTRITION (HCC): ICD-10-CM

## 2022-02-14 DIAGNOSIS — E87.6 HYPOKALEMIA: ICD-10-CM

## 2022-02-14 DIAGNOSIS — R91.1 LUNG NODULE < 6CM ON CT: ICD-10-CM

## 2022-02-14 DIAGNOSIS — F17.200 TOBACCO USE DISORDER: ICD-10-CM

## 2022-02-14 DIAGNOSIS — E87.6 HYPOKALEMIA: Primary | ICD-10-CM

## 2022-02-14 DIAGNOSIS — Z78.9 POOR HISTORIAN: ICD-10-CM

## 2022-02-14 DIAGNOSIS — F13.20 BENZODIAZEPINE DEPENDENCE (HCC): ICD-10-CM

## 2022-02-14 DIAGNOSIS — F25.1 SCHIZOAFFECTIVE DISORDER, DEPRESSIVE TYPE (HCC): ICD-10-CM

## 2022-02-14 LAB
ANION GAP SERPL CALCULATED.3IONS-SCNC: 6 MMOL/L (ref 4–13)
BUN SERPL-MCNC: 22 MG/DL (ref 5–25)
CALCIUM SERPL-MCNC: 9.9 MG/DL (ref 8.3–10.1)
CHLORIDE SERPL-SCNC: 105 MMOL/L (ref 100–108)
CO2 SERPL-SCNC: 27 MMOL/L (ref 21–32)
CREAT SERPL-MCNC: 0.67 MG/DL (ref 0.6–1.3)
GFR SERPL CREATININE-BSD FRML MDRD: 94 ML/MIN/1.73SQ M
GLUCOSE P FAST SERPL-MCNC: 98 MG/DL (ref 65–99)
POTASSIUM SERPL-SCNC: 4.3 MMOL/L (ref 3.5–5.3)
SODIUM SERPL-SCNC: 138 MMOL/L (ref 136–145)

## 2022-02-14 PROCEDURE — 36415 COLL VENOUS BLD VENIPUNCTURE: CPT

## 2022-02-14 PROCEDURE — 80048 BASIC METABOLIC PNL TOTAL CA: CPT

## 2022-02-14 PROCEDURE — 99214 OFFICE O/P EST MOD 30 MIN: CPT | Performed by: FAMILY MEDICINE

## 2022-02-14 RX ORDER — ACETAMINOPHEN 500 MG
500 TABLET ORAL EVERY 4 HOURS PRN
COMMUNITY
Start: 2022-02-08 | End: 2022-02-18

## 2022-02-14 RX ORDER — RISPERIDONE 1 MG/1
1 TABLET, FILM COATED ORAL
COMMUNITY
Start: 2022-02-08 | End: 2022-03-10

## 2022-02-14 RX ORDER — NICOTINE 21 MG/24HR
1 PATCH, TRANSDERMAL 24 HOURS TRANSDERMAL DAILY
COMMUNITY
Start: 2022-02-09 | End: 2022-02-15

## 2022-02-14 NOTE — PROGRESS NOTES
FOLLOW-UP OFFICE VISIT  [facility] - [facility]    NAME: Viktoriya Risk  AGE: 57 y o  SEX: female  : 1960     DATE: 2022     Assessment and Plan:     1  Hypokalemia-noted on BMP during hostpialazaiton  Repeat ordered for continued monitioring    - Basic metabolic panel; Future    2  Severe protein-calorie malnutrition (HCC)-worsening  Pt refuses PO when offered  Drinks fluids more so but not enough  eitology is psychiatric  Possibly worsened by dementia  Lab studies are overal stable despite tis  encouaged continue use nutriontal supplments when pt is amendable  4  Vascular dementia with behavior disturbance ([facility] [address] )  5  Schizoaffective disorder, depressive type ([facility] [address] )  6  Poor historian  3  Hypoproteinemia (HCC)-2/2 malnutrition  Mixed picture for psychiatric and neurologic etiology of behavior  pt follow with neurologic and psych  Pt decline may be hindering her sound decision making  7  Lung nodule < 6cm on CT-noted on baseline CT scan  F/u scan ordered  - CT lung nodule follow-up; Future    8  Tobacco use disorder-unchanged  Pt is precontimplative concerning smoking cessation  9  Chronic obstructive pulmonary disease, unspecified COPD type (HCC)-stable  No recent exacerbation  Lung fields clear  10  Benzodiazepine dependence (HCC)-on daily klonopin  Managed by psych  Chief Complaint:     Chief Complaint   Patient presents with    FU        History of Present Illness:     presents for follow up  Presents with bob staff member jennifer  Staff member reports continued food avoidance and weight loss  She is offered food and refuses  Continued issues with hygiene  Often will be found with fecal matter on clothes and on body  Pt reports good appetite and no trouble with self care  Pt is a poor historian  Continues to smoke  Not ready to quit  Pt had a recent psychiatric hospitlatization due to hallucinations   While hospitalized she was found positive for COVID  Was isolated for 10 days  currently asymptomatic per staff member  Review of Systems:     Review of Systems   Constitutional: Positive for appetite change and unexpected weight change  Respiratory: Negative for cough  Cardiovascular: Negative for chest pain  Gastrointestinal: Negative for abdominal pain  Musculoskeletal: Negative for gait problem  Neurological: Positive for tremors  Psychiatric/Behavioral: Positive for behavioral problems and hallucinations  Problem List:     Patient Active Problem List   Diagnosis    Anxiety    Depression    Hyperlipidemia    Esophageal reflux    Schizoaffective disorder (HCC)    Chronic obstructive pulmonary disease (HCC)    Post-menopausal    Benzodiazepine dependence ([facility] )    Confusion    Iron deficiency anemia secondary to inadequate dietary iron intake    Hypotension due to hypovolemia    Vascular dementia with behavior disturbance ([facility] [facility] )    Early onset Alzheimer's dementia without behavioral disturbance (HCC)    Tremor    Tobacco use disorder    Poor historian    Severe protein-calorie malnutrition (HCC)    Hypoproteinemia (HCC)        Objective:     /60 (BP Location: Left arm, Patient Position: Sitting)   Temp 98 9 °F (37 2 °C) (Tympanic)   Ht 5' 4" (1 626 m)   Wt 44 7 kg (98 lb 9 6 oz)   BMI 16 92 kg/m²     Physical Exam  Constitutional:       General: She is awake  Appearance: She is cachectic  She is not toxic-appearing  HENT:      Head: Normocephalic  Right Ear: External ear normal       Left Ear: External ear normal    Cardiovascular:      Rate and Rhythm: Normal rate and regular rhythm  Heart sounds: No murmur heard  Pulmonary:      Effort: Pulmonary effort is normal       Breath sounds: Normal breath sounds  Abdominal:      General: Bowel sounds are normal       Palpations: Abdomen is soft  Neurological:      Mental Status: She is alert   Mental status is at baseline  Gait: Gait normal    Psychiatric:         Attention and Perception: Attention normal          Mood and Affect: Affect is flat  Speech: Speech is delayed  Behavior: Behavior is cooperative  Judgment: Judgment is inappropriate  Pertinent Laboratory/Diagnostic Studies:    Laboratory Results: I have personally reviewed the pertinent laboratory results/reports     Radiology/Other Diagnostic Testing Results: I have personally reviewed pertinent reports          Jagdish CARRINGTON [facility]  2/16/2022 4:55 PM

## 2022-02-15 ENCOUNTER — TELEPHONE (OUTPATIENT)
Dept: INTERNAL MEDICINE CLINIC | Facility: CLINIC | Age: 62
End: 2022-02-15

## 2022-02-15 PROBLEM — Z01.419 ENCOUNTER FOR GYNECOLOGICAL EXAMINATION WITHOUT ABNORMAL FINDING: Status: RESOLVED | Noted: 2019-09-30 | Resolved: 2022-01-01

## 2022-02-15 PROBLEM — E77.8 HYPOPROTEINEMIA (HCC): Status: ACTIVE | Noted: 2022-02-15

## 2022-02-15 NOTE — TELEPHONE ENCOUNTER
----- Message from Kayla Gary DO sent at 2/15/2022 12:25 PM EST -----  Please notify pt that her potassium has normalized since leaving hospital  No changes needed

## 2022-02-22 ENCOUNTER — OFFICE VISIT (OUTPATIENT)
Dept: NEUROLOGY | Facility: CLINIC | Age: 62
End: 2022-02-22
Payer: MEDICARE

## 2022-02-22 VITALS
SYSTOLIC BLOOD PRESSURE: 110 MMHG | WEIGHT: 97.2 LBS | TEMPERATURE: 97.2 F | HEART RATE: 117 BPM | DIASTOLIC BLOOD PRESSURE: 58 MMHG | BODY MASS INDEX: 16.59 KG/M2 | HEIGHT: 64 IN | OXYGEN SATURATION: 98 %

## 2022-02-22 DIAGNOSIS — F41.9 ANXIETY: ICD-10-CM

## 2022-02-22 DIAGNOSIS — R25.1 TREMOR: ICD-10-CM

## 2022-02-22 DIAGNOSIS — F02.80 EARLY ONSET ALZHEIMER'S DEMENTIA WITHOUT BEHAVIORAL DISTURBANCE (HCC): Primary | ICD-10-CM

## 2022-02-22 DIAGNOSIS — G30.0 EARLY ONSET ALZHEIMER'S DEMENTIA WITHOUT BEHAVIORAL DISTURBANCE (HCC): Primary | ICD-10-CM

## 2022-02-22 DIAGNOSIS — F25.1 SCHIZOAFFECTIVE DISORDER, DEPRESSIVE TYPE (HCC): ICD-10-CM

## 2022-02-22 DIAGNOSIS — F17.200 TOBACCO USE DISORDER: ICD-10-CM

## 2022-02-22 PROCEDURE — 99214 OFFICE O/P EST MOD 30 MIN: CPT | Performed by: PSYCHIATRY & NEUROLOGY

## 2022-02-22 NOTE — PROGRESS NOTES
Karissa Alcantar is a 57 y o  female  Chief Complaint   Patient presents with    Early onset Alzheimer's dementia without behavioral disturb       Assessment:  1  Early onset Alzheimer's dementia without behavioral disturbance ([facility] [address] [address] )    2  Anxiety    3  Tremor    4  Tobacco use disorder    5  Schizoaffective disorder, depressive type ([facility] )        Plan:  Patient is not keen to go on any medications  Fall safety and aspiration precautions and to be under constant supervision  Continue with mentally stimulating exercises  Discuss with psychiatrist to adjust her psychiatric medications  Follow-up in 6 months      Discussion:  Patient does have cognitive impairment and possible early dementia, I discussed with patient's manager on the phone also patient is not keen to go on any medications and understands the risks advised her to discuss with her psychiatrist and adjust her psychiatric medications which could be contributing to her tremor she does have some parkinsonian features, she was also advised to discuss with family physician regarding her weight loss to take fall safety and aspiration precautions to be under constant supervision we will check some routine blood work including a vitamin B12 level to go to the hospital if has any worsening symptoms and call me otherwise to see me back in 6 months and follow up with the other physicians,    Subjective:    HPI   Patient is here in follow-up for her tremor and difficulty with memory, patient is accompanied with caregiver she is not a great historian I did speak to the patient's manager and according to her patient did have a psychiatric evaluation and was felt that she does have dementia but patient is refusing to go on any medications, she denies having any swallowing difficulty she has lost some weight she is under constant supervision she needs some help with her ADLs she has not been eating food and she is only drinking liquids and has been losing weight she does wear depends and sometimes is incontinent of her bladder no freezing episodes no other complaints,    Vitals:    02/22/22 0838   Pulse: (!) 117   Temp: (!) 97 2 °F (36 2 °C)   TempSrc: Temporal   SpO2: 98%   Weight: 44 1 kg (97 lb 3 2 oz)   Height: 5' 4" (1 626 m)       Current Medications    Current Outpatient Medications:     artificial tear (LUBRIFRESH P M ) 83-15 % ophthalmic ointment, daily at bedtime, Disp: , Rfl:     Aspirin 81 MG CAPS, Take 81 mg by mouth daily, Disp: 30 capsule, Rfl: 6    atorvastatin (LIPITOR) 10 mg tablet, Take 1 tablet (10 mg total) by mouth daily at bedtime Hyperlipidemia, Disp: 28 tablet, Rfl: 4    benztropine (COGENTIN) 1 mg tablet, , Disp: , Rfl:     clonazePAM (KLONOPIN) 1 mg tablet, Take 1 mg by mouth 3 (three) times a day , Disp: , Rfl:     docusate sodium (COLACE) 100 mg capsule, Take 1 capsule (100 mg total) by mouth 2 (two) times a day as needed for constipation, Disp: 28 capsule, Rfl: 4    ferrous sulfate 324 (65 Fe) mg, 1 TAB ORALLY EVERY MORNING BEFORE BREAKFAST DX:ANEMIA, Disp: 28 tablet, Rfl: 4    hydrOXYzine HCL (ATARAX) 25 mg tablet, Take 1 tablet (25 mg total) by mouth every 6 (six) hours as needed for itching, Disp: , Rfl: 0    magnesium oxide (MAG-OX) 400 mg tablet, Take one tab orally twice daily, Disp: 56 tablet, Rfl: 4    melatonin 3 mg, Take 6 mg by mouth, Disp: , Rfl:     Nutritional Supplements (Ensure Active) LIQD, Take 0 8 g by mouth, Disp: , Rfl:     oxybutynin (DITROPAN) 5 mg tablet, Take one tab orally twice daily, Disp: 56 tablet, Rfl: 4    pantoprazole (PROTONIX) 40 mg tablet, Take 1 tablet (40 mg total) by mouth every morning GERD, Disp: 28 tablet, Rfl: 4    risperiDONE (RisperDAL) 1 mg tablet, Take 1 mg by mouth, Disp: , Rfl:     sertraline (ZOLOFT) 100 mg tablet, Take 150 mg by mouth 2 (two) times a day , Disp: , Rfl:     SM Aspirin Adult Low Strength 81 MG EC tablet, 1 TAB ORALLY EVERY MORNING DX: STROKE PREVENTION, Disp: 28 tablet, Rfl: 4    tolnaftate (TINACTIN) 1 % cream, Apply topically 2 (two) times a day  , Disp: , Rfl:     traZODone (DESYREL) 150 mg tablet, Take 150 mg by mouth daily at bedtime , Disp: , Rfl:     ziprasidone (GEODON) 40 mg capsule, Take 80 mg by mouth 2 (two) times a day 20mg in morning 60mg at bedtime , Disp: , Rfl:     alclomethasone (ACLOVATE) 0 05 % cream, Apply topically 2 to 3 times a day to affected areas of the ears for up to 2 weeks  (Patient not taking: Reported on 2/22/2022 ), Disp: 15 g, Rfl: 0      Allergies  Quetiapine and Penicillins    Past Medical History  Past Medical History:   Diagnosis Date    Abnormal weight loss     Anxiety     Ben[facility]odiazepine abuse ([facility] [address] )     Depression     Facial cellulitis     Malaise     Nicotine dependence     Psychiatric disorder     Suicidal ideation          Past Surgical History:  Past Surgical History:   Procedure Laterality Date    HIP SURGERY Right          Family History:  Family History   Problem Relation Age of Onset    Diabetes Mother     Stroke Mother         Syndrome    Heart attack Father     Other Brother         Intellectual Disabilities    No Known Problems Sister     No Known Problems Maternal Grandmother     Breast cancer Paternal Grandmother 68    Breast cancer Maternal Aunt 39    No Known Problems Maternal Aunt     No Known Problems Maternal Aunt     Colon cancer Neg Hx     Ovarian cancer Neg Hx     Cervical cancer Neg Hx     Uterine cancer Neg Hx        Social History:   reports that she has been smoking  She started smoking about 45 years ago  She has a 10 00 pack-year smoking history  She has never used smokeless tobacco  She reports that she does not drink alcohol and does not use drugs  I have reviewed the past medical history, surgical history, social and family history, current medications, allergies vitals, review of systems, and updated this information as appropriate today     Objective:    Physical Exam    Neurological Exam    GENERAL:  Cooperative in no acute distress  Well-developed and well-nourished    HEAD and NECK   Head is atraumatic normocephalic with no lesions or masses  Neck is supple with full range of motion    CARDIOVASCULAR  Carotid Arteries-no carotid bruits  NEUROLOGIC:  Mental Status-the patient is awake alert and oriented to simple commands without aphasia or apraxia  Cranial Nerves: Visual fields are full to confrontation  Extraocular movements are full without nystagmus  Pupils are 2-1/2 mm and reactive  Face is symmetrical to light touch  Movements of facial expression move symmetrically  Hearing is normal to finger rub bilaterally  Soft palate lifts symmetrically  Shoulder shrug is symmetrical  Tongue is midline without atrophy  Motor: No drift is noted on arm extension  Strength is full in the upper and lower extremities with normal bulk and mild cogwheeling rigidity left hand worse than the right hand and slight tremor on outstretched hands  Gait is unremarkable  ROS:  Review of Systems   Constitutional: Positive for appetite change  Negative for fever  HENT: Negative  Negative for hearing loss, tinnitus, trouble swallowing and voice change  Eyes: Negative  Negative for photophobia and pain  Respiratory: Positive for shortness of breath  Cardiovascular: Negative for palpitations  Gastrointestinal: Negative  Negative for nausea and vomiting  Endocrine: Negative  Negative for cold intolerance  Genitourinary: Negative  Negative for dysuria, frequency and urgency  Musculoskeletal: Positive for back pain, gait problem, neck pain and neck stiffness  Negative for myalgias  Skin: Negative  Negative for rash  Neurological: Positive for dizziness, tremors, speech difficulty and weakness  Negative for seizures, syncope, facial asymmetry, light-headedness, numbness and headaches          Patient stated that she has tremors and weakness  in both hands and legs     Hematological: Negative  Does not bruise/bleed easily  Psychiatric/Behavioral: Positive for confusion and sleep disturbance  Negative for hallucinations

## 2022-02-25 ENCOUNTER — TELEPHONE (OUTPATIENT)
Dept: INTERNAL MEDICINE CLINIC | Facility: CLINIC | Age: 62
End: 2022-02-25

## 2022-03-11 ENCOUNTER — RA CDI HCC (OUTPATIENT)
Dept: OTHER | Facility: HOSPITAL | Age: 62
End: 2022-03-11

## 2022-07-02 NOTE — ED NOTES
7/2/2022 @ 1753: This writer provided an update to the patient  The patient became visibly anxious and started to pace  The patient was requesting her Klonopin and a dinner tray  This information was relayed to both the MD and nursing staff  Validation and support were given to the patient

## 2022-07-02 NOTE — ED NOTES
7/2/2022 @ 1320: This writer met with the patient and introduced self and role  The patient presented to the ED today reporting auditory hallucinations, and suicidal ideations with a plan to jump in front of a moving vehicle  The patient was guarded during the assessment and appeared to be responding to internal stimuli  The patient was calm and cooperative and pleasant in responding  The patient recently moved to the area from the [address] [place] [address] and currently resides in a group home/nursing home ([facility])  The patient is having a hard time adapting, and has been feeling suicidal and hearing voices consistently for the past week  The voices are telling her that they want to hurt her and kill hurt, and they are also telling her to hurt herself  The patient stated that she is "often fearful and paranoid "  This writer inquired on if the patient would act out on her plan if she were to leave the hospital and the patient replied tearfully "yes " The patient denies visual hallucinations and delusions, but does experience auditory hallucinations and paranoia  The patient denies HI and SIB, but admits to current SI with a plan to jump in front of a moving vehicle  The patient has a history of inpatient hospital admissions for behavioral health, and has been diagnosed with Schizoaffective Disorder  The patient's previous psychiatrist when she lived in the [address] [place] [address] was Dr Pena Life  The patient's current residence takes care of her medications for her  The patient denies legal issues and substance abuse  The patient admits to smoking one pack of cigarettes per day  The patient stated that she is "depressed, anxious, scared, and fearful " The patient reports that both her appetite and sleep are fair  The patient admits to no past suicide attempts but does report a HX of fleeting SI  The patient is requesting to sign herself in voluntarily  7/2/2022 @ 1350:  This writer met with the MD and discussed the patient's disposition  We deemed that the patient would benefit from an inpatient level of care  This information was presented to the patient who confirmed that she would be willing to sign herself in voluntarily  This writer formulated the 12 and presented it to the patient, and the patient's rights were read and explained at length  The patient verbalized understanding  Signatures obtained by both the patient and MD   Referral faxed [facility] to [facility] [address]'s Intake  [facility] - no in network beds available  Will do an extended bedsearch  [facility]: Line kept ringing - no response  [facility]: Per Milagro Alvarado - no current bed availability  [address]: Transferred 3x with no answer  [facility]: Left message  [facility]: Spoke to [facility] - she advised that she cannot give a bed availability status by did encourage to fax the referral over for review  [facility] [place]: Per Catina Caicedo, they have no bed availability  [facility]: Spoke to [facility] who provided this writer with the contact information for their admissions   Contact: Varun flair - [phone]  *Left message with Adrian Kim, will have dayshift follow-up with her tomorrow as she is back in the office tomorrow 7/3 @ 9798  154.9

## 2022-07-02 NOTE — ED PROVIDER NOTES
History  Chief Complaint   Patient presents with    Psychiatric Evaluation     Patient seeking psychiatric evaluation  Patient reports auditory hallucinations to hurt herself  Patient reports she has a plan to hurt herself by jumping in front of a car  Patient is a 23-year-old female with history of psych disorder, depression, who presents for psychiatric evaluation  Patient says that she is having suicidal thoughts    She says she has been having them for the past week  She says she thought about jumping in front of a car  She is hearing voices saying that they are coming to get her    The patient denies any physical complaints at this time  Prior to Admission Medications   Prescriptions Last Dose Informant Patient Reported? Taking? Aspirin 81 MG CAPS   No No   Sig: Take 81 mg by mouth daily   Nutritional Supplements (Ensure Active) LIQD   Yes No   Sig: Take 0 8 g by mouth   SM Aspirin Adult Low Strength 81 MG EC tablet  Outside Facility (Specify) No No   Si TAB ORALLY EVERY MORNING DX: STROKE PREVENTION   alclomethasone (ACLOVATE) 0 05 % cream   No No   Sig: Apply topically 2 to 3 times a day to affected areas of the ears for up to 2 weeks     Patient not taking: Reported on 2022    artificial tear (LUBRIFRESH P M ) 83-15 % ophthalmic ointment  Outside Facility (Specify) Yes No   Sig: daily at bedtime   atorvastatin (LIPITOR) 10 mg tablet   No No   Sig: Take 1 tablet (10 mg total) by mouth daily at bedtime Hyperlipidemia   benztropine (COGENTIN) 1 mg tablet  Outside Facility (Specify) Yes No   clonazePAM (KLONOPIN) 1 mg tablet  Outside Facility (Specify) Yes No   Sig: Take 1 mg by mouth 3 (three) times a day    docusate sodium (COLACE) 100 mg capsule   No No   Sig: Take 1 capsule (100 mg total) by mouth 2 (two) times a day as needed for constipation   ferrous sulfate 324 (65 Fe) mg Unknown at Unknown time  No No   Si TAB ORALLY EVERY MORNING BEFORE BREAKFAST DX:ANEMIA hydrOXYzine HCL (ATARAX) 25 mg tablet Unknown at Unknown time Outside Facility (Specify) No No   Sig: Take 1 tablet (25 mg total) by mouth every 6 (six) hours as needed for itching   magnesium oxide (MAG-OX) 400 mg tablet Unknown at Unknown time  No No   Sig: Take one tab orally twice daily   melatonin 3 mg  Outside Facility (Specify) Yes No   Sig: Take 6 mg by mouth   oxybutynin (DITROPAN) 5 mg tablet   No No   Sig: Take one tab orally twice daily   pantoprazole (PROTONIX) 40 mg tablet   No No   Sig: Take 1 tablet (40 mg total) by mouth every morning GERD   risperiDONE (RisperDAL) 1 mg tablet   Yes No   Sig: Take 1 mg by mouth   sertraline (ZOLOFT) 100 mg tablet  Outside Facility (Specify) Yes No   Sig: Take 150 mg by mouth 2 (two) times a day    tolnaftate (TINACTIN) 1 % cream  Outside Facility (Specify) Yes No   Sig: Apply topically 2 (two) times a day     traZODone (DESYREL) 150 mg tablet  Outside Facility (Specify) Yes No   Sig: Take 150 mg by mouth daily at bedtime    ziprasidone (GEODON) 40 mg capsule Not Taking at Unknown time Outside Facility (Specify) Yes No   Sig: Take 80 mg by mouth 2 (two) times a day 20mg in morning 60mg at bedtime    Patient not taking: Reported on 7/2/2022      Facility-Administered Medications: None       Past Medical History:   Diagnosis Date    Abnormal weight loss     Anxiety     Benzodiazepine abuse (HCC)     Depression     Facial cellulitis     Malaise     Nicotine dependence     Psychiatric disorder     Suicidal ideation        Past Surgical History:   Procedure Laterality Date    HIP SURGERY Right        Family History   Problem Relation Age of Onset    Diabetes Mother     Stroke Mother         Syndrome    Heart attack Father     Other Brother         Intellectual Disabilities    No Known Problems Sister     No Known Problems Maternal Grandmother     Breast cancer Paternal Grandmother 68    Breast cancer Maternal Aunt 39    No Known Problems Maternal Aunt  No Known Problems Maternal Aunt     Colon cancer Neg Hx     Ovarian cancer Neg Hx     Cervical cancer Neg Hx     Uterine cancer Neg Hx      I have reviewed and agree with the history as documented  E-Cigarette/Vaping    E-Cigarette Use Never User      E-Cigarette/Vaping Substances    Nicotine No     THC No     CBD No     Flavoring No     Other No     Unknown No      Social History     Tobacco Use    Smoking status: Current Every Day Smoker     Packs/day: 1 00     Years: 20 00     Pack years: 20 00     Start date: 1977    Smokeless tobacco: Never Used    Tobacco comment: current every day smoker per [employer]   Vaping Use    Vaping Use: Never used   Substance Use Topics    Alcohol use: No    Drug use: No       Review of Systems   Constitutional: Negative for fever and unexpected weight change  HENT: Negative for congestion, ear pain, sore throat and trouble swallowing  Eyes: Negative for pain and redness  Respiratory: Negative for cough, chest tightness and shortness of breath  Cardiovascular: Negative for chest pain and leg swelling  Gastrointestinal: Negative for abdominal distention, abdominal pain, diarrhea and vomiting  Endocrine: Negative for polyuria  Genitourinary: Negative for dysuria, hematuria, pelvic pain and vaginal bleeding  Musculoskeletal: Negative for back pain and myalgias  Skin: Negative for rash  Neurological: Negative for dizziness, syncope, weakness, light-headedness and headaches  Psychiatric/Behavioral: Positive for suicidal ideas  The patient is nervous/anxious  Physical Exam  Physical Exam  Vitals and nursing note reviewed  Constitutional:       General: She is not in acute distress  Appearance: She is well-developed  HENT:      Head: Normocephalic and atraumatic        Right Ear: External ear normal       Left Ear: External ear normal       Nose: Nose normal       Mouth/Throat:      Mouth: Mucous membranes are moist  Pharynx: No oropharyngeal exudate  Eyes:      Conjunctiva/sclera: Conjunctivae normal       Pupils: Pupils are equal, round, and reactive to light  Cardiovascular:      Rate and Rhythm: Normal rate and regular rhythm  Heart sounds: Normal heart sounds  No murmur heard  No friction rub  No gallop  Pulmonary:      Effort: Pulmonary effort is normal  No respiratory distress  Breath sounds: Normal breath sounds  No wheezing or rales  Abdominal:      General: There is no distension  Palpations: Abdomen is soft  Tenderness: There is no abdominal tenderness  There is no guarding  Musculoskeletal:         General: No swelling, tenderness or deformity  Normal range of motion  Cervical back: Normal range of motion and neck supple  Lymphadenopathy:      Cervical: No cervical adenopathy  Skin:     General: Skin is warm and dry  Neurological:      General: No focal deficit present  Mental Status: She is alert and oriented to person, place, and time  Mental status is at baseline  Cranial Nerves: No cranial nerve deficit  Sensory: No sensory deficit  Motor: No weakness or abnormal muscle tone  Coordination: Coordination normal    Psychiatric:         Attention and Perception: She perceives auditory hallucinations  Speech: Speech normal          Behavior: Behavior normal          Thought Content: Thought content includes suicidal ideation  Thought content includes suicidal plan           Vital Signs  ED Triage Vitals [07/02/22 1145]   Temperature Pulse Respirations Blood Pressure SpO2   98 9 °F (37 2 °C) 84 18 94/69 96 %      Temp Source Heart Rate Source Patient Position - Orthostatic VS BP Location FiO2 (%)   Tympanic Monitor Lying Right arm --      Pain Score       No Pain           Vitals:    07/02/22 1145   BP: 94/69   Pulse: 84   Patient Position - Orthostatic VS: Lying         Visual Acuity      ED Medications  Medications   nicotine (NICODERM CQ) 21 mg/24 hr TD 24 hr patch 21 mg (21 mg Transdermal Medication Applied 7/2/22 1230)   clonazePAM (KlonoPIN) tablet 0 5 mg (0 5 mg Oral Given 7/2/22 1227)       Diagnostic Studies  Results Reviewed     Procedure Component Value Units Date/Time    TSH, 3rd generation with Free T4 reflex [[id number]]  (Normal) Collected: 07/02/22 1215    Lab Status: Final result Specimen: Blood from Arm, Right Updated: 07/02/22 1305     TSH 3RD GENERATON 0 595 uIU/mL     Narrative:      Patients undergoing fluorescein dye angiography may retain small amounts of fluorescein in the body for 48-72 hours post procedure  Samples containing fluorescein can produce falsely depressed TSH values  If the patient had this procedure,a specimen should be resubmitted post fluorescein clearance        Comprehensive metabolic panel [[id number]] Collected: 07/02/22 1215    Lab Status: Final result Specimen: Blood from Arm, Right Updated: 07/02/22 1305     Sodium 137 mmol/L      Potassium 3 6 mmol/L      Chloride 104 mmol/L      CO2 24 mmol/L      ANION GAP 9 mmol/L      BUN 7 mg/dL      Creatinine 0 77 mg/dL      Glucose 94 mg/dL      Calcium 9 1 mg/dL      AST 19 U/L      ALT 12 U/L      Alkaline Phosphatase 94 U/L      Total Protein 6 8 g/dL      Albumin 4 0 g/dL      Total Bilirubin 0 47 mg/dL      eGFR 83 ml/min/1 73sq m     Narrative:      [place] guidelines for Chronic Kidney Disease (CKD):     Stage 1 with normal or high GFR (GFR > 90 mL/min/1 73 square meters)    Stage 2 Mild CKD (GFR = 60-89 mL/min/1 73 square meters)    Stage 3A Moderate CKD (GFR = 45-59 mL/min/1 73 square meters)    Stage 3B Moderate CKD (GFR = 30-44 mL/min/1 73 square meters)    Stage 4 Severe CKD (GFR = 15-29 mL/min/1 73 square meters)    Stage 5 End Stage CKD (GFR <15 mL/min/1 73 square meters)  Note: GFR calculation is accurate only with a steady state creatinine    COVID/FLU/RSV - 2 hour TAT [[id number]]  (Normal) Collected: 07/02/22 1215 Lab Status: Final result Specimen: Nares from Nasopharyngeal Swab Updated: 07/02/22 1304     SARS-CoV-2 Negative     INFLUENZA A PCR Negative     INFLUENZA B PCR Negative     RSV PCR Negative    Narrative:      FOR PEDIATRIC PATIENTS - copy/paste COVID Guidelines URL to browser: [email or web address]    SARS-CoV-2 assay is a Nucleic Acid Amplification assay intended for the  qualitative detection of nucleic acid from SARS-CoV-2 in nasopharyngeal  swabs  Results are for the presumptive identification of SARS-CoV-2 RNA  Positive results are indicative of infection with SARS-CoV-2, the virus  causing COVID-19, but do not rule out bacterial infection or co-infection  with other viruses  Laboratories within the [place] and its  territories are required to report all positive results to the appropriate  public health authorities  Negative results do not preclude SARS-CoV-2  infection and should not be used as the sole basis for treatment or other  patient management decisions  Negative results must be combined with  clinical observations, patient history, and epidemiological information  This test has not been FDA cleared or approved  This test has been authorized by FDA under an Emergency Use Authorization  (EUA)  This test is only authorized for the duration of time the  declaration that circumstances exist justifying the authorization of the  emergency use of an in vitro diagnostic tests for detection of SARS-CoV-2  virus and/or diagnosis of COVID-19 infection under section 564(b)(1) of  the Act, 21 U  S C  373OWV-2(R)(7), unless the authorization is terminated  or revoked sooner  The test has been validated but independent review by FDA  and CLIA is pending  Test performed using [employer] GeneXpert: This RT-PCR assay targets N2,  a region unique to SARS-CoV-2   A conserved region in the E-gene was chosen  for pan-Sarbecovirus detection which includes SARS-CoV-2  Ethanol [[id number]]  (Normal) Collected: 07/02/22 1219    Lab Status: Final result Specimen: Blood from Arm, Right Updated: 07/02/22 1301     Ethanol Lvl <10 mg/dL     CBC and differential [[id number]]  (Abnormal) Collected: 07/02/22 1215    Lab Status: Final result Specimen: Blood from Arm, Right Updated: 07/02/22 1226     WBC 8 79 Thousand/uL      RBC 4 31 Million/uL      Hemoglobin 12 6 g/dL      Hematocrit 38 4 %      MCV 89 fL      MCH 29 2 pg      MCHC 32 8 g/dL      RDW 14 0 %      MPV 10 5 fL      Platelets 997 Thousands/uL      nRBC 0 /100 WBCs      Neutrophils Relative 81 %      Immat GRANS % 0 %      Lymphocytes Relative 14 %      Monocytes Relative 4 %      Eosinophils Relative 0 %      Basophils Relative 1 %      Neutrophils Absolute 7 07 Thousands/µL      Immature Grans Absolute 0 02 Thousand/uL      Lymphocytes Absolute 1 26 Thousands/µL      Monocytes Absolute 0 38 Thousand/µL      Eosinophils Absolute 0 02 Thousand/µL      Basophils Absolute 0 04 Thousands/µL     Rapid drug screen, urine [[id number]]  (Normal) Collected: 07/02/22 1204    Lab Status: Final result Specimen: Urine, Clean Catch Updated: 07/02/22 1221     Amph/Meth UR Negative     Barbiturate Ur Negative     Benzodiazepine Urine Negative     Cocaine Urine Negative     Methadone Urine Negative     Opiate Urine Negative     PCP Ur Negative     THC Urine Negative     Oxycodone Urine Negative    Narrative:      FOR MEDICAL PURPOSES ONLY  IF CONFIRMATION NEEDED PLEASE CONTACT THE LAB WITHIN 5 DAYS      Drug Screen Cutoff Levels:  AMPHETAMINE/METHAMPHETAMINES  1000 ng/mL  BARBITURATES     200 ng/mL  BENZODIAZEPINES     200 ng/mL  COCAINE      300 ng/mL  METHADONE      300 ng/mL  OPIATES      300 ng/mL  PHENCYCLIDINE     25 ng/mL  THC       50 ng/mL  OXYCODONE      100 ng/mL    UA (URINE) with reflex to Scope [[id number]]  (Normal) Collected: 07/02/22 1204    Lab Status: Final result Specimen: Urine, Clean Catch Updated: 07/02/22 1211     Color, UA Yellow     Clarity, UA Clear     Specific Gravity, UA 1 010     pH, UA 6 0     Leukocytes, UA Negative     Nitrite, UA Negative     Protein, UA Negative mg/dl      Glucose, UA Negative mg/dl      Ketones, UA Negative mg/dl      Urobilinogen, UA 0 2 E U /dl      Bilirubin, UA Negative     Occult Blood, UA Negative                 No orders to display              Procedures  Procedures         ED Course  ED Course as of 07/02/22 1627   Sat Jul 02, 2022   1308 Patient is medically cleared for inpatient psychiatric treatment    1609 201 signed                                 SBIRT 22yo+    Flowsheet Row Most Recent Value   SBIRT (25 yo +)    In order to provide better care to our patients, we are screening all of our patients for alcohol and drug use  Would it be okay to ask you these screening questions? No Filed at: 07/02/2022 1255                    [facility]  Number of Diagnoses or Management Options  Auditory hallucinations  Suicidal ideations  Diagnosis management comments: 68-year-old female presenting for evaluation of suicidal ideation, auditory hallucinations  Says that she had a plan of jumping in front of a car  Has had suicidal thoughts for the past week  Will obtain [facility] psych workup  Will have crisis evaluate patient  Labs within limits crisis evaluated patient, patient signed [place]    Pending placement      Disposition  Final diagnoses:   Suicidal ideations   Auditory hallucinations     Time reflects when diagnosis was documented in both MDM as applicable and the Disposition within this note     Time User Action Codes Description Comment    7/2/2022  1:10 PM Neila Landau Suicidal ideations     7/2/2022  1:10 PM Abeba Gutierrez Add [R44 0] Auditory hallucinations       ED Disposition     ED Disposition   Transfer to [address] [place] [address]   --    Date/Time   Sat Jul 2, 2022  1:10 PM    Comment   Tatiana Panda should be transferred out to [facility] and has been medically cleared  Follow-up Information    None         Patient's Medications   Discharge Prescriptions    No medications on file       No discharge procedures on file      PDMP Review     None          ED Provider  Electronically Signed by           Tona Hicks DO  07/02/22 5598

## 2022-07-03 NOTE — ED NOTES
Pt is currently sleeping in bed with her virtual one to one intact  unable to obtain vitals at this time       Aubrie Herndon RN  07/02/22 6324

## 2022-07-03 NOTE — ED NOTES
No network beds as per intake  [facility] as per Janessa no beds  [facility]: Full per Uma De La Cruz from Intake  [facility]: No adult F beds per [facility]: Full as per [facility]  [facility]: No adult beds per Dre [facility]: No adult female beds as per Rogers Merritt  [facility]: Full as per [facility]  [facility]: No OA openings per Janessa  [facility]: no beds as per [facility]: No adult beds as per [place] [place]: No adult beds per Kenyatta Campbell

## 2022-07-03 NOTE — ED NOTES
Pt awake , apple juice given per pt request On continues virtual monitoring  Will continue monitor pt  Rohini AMIN   [address] , [address] [place] [address]  07/03/22 2128

## 2022-07-03 NOTE — ED NOTES
Patient is resting comfortably  On virtual observation  Will continue monitor pt  Rohini Leija RN  07/03/22 0645

## 2022-07-03 NOTE — ED NOTES
Received pt sleeping on virtual observation  Will continue to monitor  [facility]  07/02/22 1924

## 2022-07-03 NOTE — ED NOTES
Patient is resting comfortably  On continues virtual observation  Will continue monitor pt  Rohini Young  07/03/22 8137

## 2022-07-04 NOTE — ED NOTES
[facility]: no network beds as per intake  [facility]: as per Aden Rodriguez no beds   [employer] [facility]: as per Alethea no beds   [facility]: as per Miki Gar no beds    [facility]: as per Pb Mahoney no beds  [facility]: As per All Rodriguez no beds    [facility]: as per [place] no [employer]: as per [employer] no [employer]: As per Summer no beds today or tomorrow  [place]: as per Beatris Mae no beds

## 2022-07-04 NOTE — ED CARE HANDOFF
Emergency Department Sign Out Note        Sign out and transfer of care from Dr Damon Paniagua  See Separate Emergency Department note  The patient, Duane Ray, was evaluated by the previous provider for SI/201  Workup Completed:  Cbc, cmp, uds, covid, etoh    ED Course / Workup Pending (followup):                                      ED Course as of 07/04/22 0615   Bambi Katz Jul 03, 2022   0009 Received in sign out:     Pt is 201 for SI    Medically cleared: cbc, cmp, uds, covid, ETOH done  No issues during last shift    Bed search in progress       Procedures  MDM        Disposition  Final diagnoses:   Suicidal ideations   Auditory hallucinations     Time reflects when diagnosis was documented in both MDM as applicable and the Disposition within this note     Time User Action Codes Description Comment    7/2/2022  1:10 PM Nena Gotebo Add [A68 405] Suicidal ideations     7/2/2022  1:10 PM Nena Gotebo Add [R44 0] Auditory hallucinations       ED Disposition     ED Disposition   Transfer to Behavioral Health    [facility]   --    Date/Time   Sat Jul 2, 2022  1:10 PM    Comment   Duane Ray should be transferred out to D and has been medically cleared  Follow-up Information    None       Patient's Medications   Discharge Prescriptions    No medications on file     No discharge procedures on file         ED Provider  Electronically Signed by     El Pacheco MD  07/04/22 5260

## 2022-07-04 NOTE — ED NOTES
7/4/2022 Bedsearch:    [facility]: No network beds per Pam Harris in intake  [facility]: Per Jerzy Bright, no bed availability at this time  [facility]: Per Kvng Pabon, no bed availability at this time  [place]: Per Ki Domingo, no bed availability at this time  [facility]: Per [facility], no bed availability at this time  [facility]: Continuous ringing - no answer  [facility]: Per Alirio - no adult beds available at this time  [place]: Per [facility] - no adult beds available at this time  [facility]: Per [place] - there are no adult beds today or tomorrow  [place]: Per Nicolette Macdonald - no bed availability at this time

## 2022-07-04 NOTE — ED NOTES
Report received from [facility] Aleksandar Lowe, [facility]  All needs met at this time        Tracy Qureshi, RN  07/04/22 2623

## 2022-07-04 NOTE — ED NOTES
Patient appears to be resting with eyes closed, all needs met at this time        Leland Mustafa, DASH  07/04/22 6810

## 2022-07-04 NOTE — ED CARE HANDOFF
Emergency Department Sign Out Note        Sign out and transfer of care from [place]  See Separate Emergency Department note  The patient, Savita Dumont, was evaluated by the previous provider for Psych  Workup Completed:  Pending Placement    ED Course / Workup Pending (followup): Medically cleared for inpatient psych                                  ED Course as of 07/03/22 2134   Sat Jul 02, 2022   1638 Voluntary SI, plan to jump in front of car, 201 signed     Procedures  MDM        Disposition  Final diagnoses:   Suicidal ideations   Auditory hallucinations     Time reflects when diagnosis was documented in both MDM as applicable and the Disposition within this note     Time User Action Codes Description Comment    7/2/2022  1:10 PM Carmenza Del Castillo Suicidal ideations     7/2/2022  1:10 PM Vance Hopkins Add [R44 0] Auditory hallucinations       ED Disposition     ED Disposition   Transfer to [address] [place] [address]   --    Date/Time   Sat Jul 2, 2022  1:10 PM    Comment   Savita Dumont should be transferred out to [facility] and has been medically cleared  Follow-up Information    None       Patient's Medications   Discharge Prescriptions    No medications on file     No discharge procedures on file         ED Provider  Electronically Signed by     Nasim Cooney DO  07/03/22 2134

## 2022-07-05 NOTE — ED NOTES
Spoke with Arabella at intake, confirmed referral was received and will be reviewed when a bed becomes available

## 2022-07-05 NOTE — ED NOTES
Patient is accepted at [facility]  Patient is accepted by Dr Diego Gonzalez  per [address] TH[address]ON is TBD        Nurse report is to be called to [phone] prior to patient transfer

## 2022-07-05 NOTE — ED CARE HANDOFF
Emergency Department Sign Out Note        Sign out and transfer of care from Dr Velvet Alexander  See Separate Emergency Department note  The patient, Meryl Garcia, was evaluated by the previous provider for SI  Workup Completed:    Uds, covid, etoh, cbc, cmp     ED Course / Workup Pending (followup):                                      ED Course as of 07/05/22 0611   Josephine Fonsecas Jul 03, 2022   0009 Received in sign out:     Pt is 201 for SI    Medically cleared: cbc, cmp, uds, covid, ETOH done  No issues during last shift    Bed search in progress     Tue Jul 05, 2022   0028 Received in sign out:     Pt is 201 for SI    Medically cleared: cbc, cmp, uds, covid, ETOH done  No issues during last shift    Bed search in progress         Procedures  MDM        Disposition  Final diagnoses:   Suicidal ideations   Auditory hallucinations     Time reflects when diagnosis was documented in both MDM as applicable and the Disposition within this note     Time User Action Codes Description Comment    7/2/2022  1:10 PM Maria E Kitchen Add [P07 136] Suicidal ideations     7/2/2022  1:10 PM Maria E Rose Add [R44 0] Auditory hallucinations       ED Disposition     ED Disposition   Transfer to Behavioral Health    Condition   --    Date/Time   Sat Jul 2, 2022  1:10 PM    Comment   Meryl Garcia should be transferred out to D and has been medically cleared  Follow-up Information    None       Patient's Medications   Discharge Prescriptions    No medications on file     No discharge procedures on file         ED Provider  Electronically Signed by     Cinthya Kwong MD  07/05/22 4055

## 2022-07-05 NOTE — ED NOTES
Ne Suicide Risk Assessment deferred, as unable to assess while patient sleeping  Behavioral Health Assessment deferred as patient is sleeping and would benefit from additional rest   Vital signs deferred until patient awake, no signs or symptoms of respiratory distress at this time  Once patient is awake and able to participate, will complete assessments         Lamonte Alexander RN  07/05/22 0565

## 2022-07-05 NOTE — ED NOTES
Pt states she is anxious  Pt requesting Ativan  Pt was offered a drink/Barbara Mist  Attending and pt's nurse notified

## 2022-07-05 NOTE — ED NOTES
Ne Suicide Risk Assessment deferred, as unable to assess while patient sleeping  Behavioral Health Assessment deferred as patient is sleeping and would benefit from additional rest   Vital signs deferred until patient awake, no signs or symptoms of respiratory distress at this time  Once patient is awake and able to participate, will complete assessments         Drake Kim RN  07/05/22 0700

## 2022-07-05 NOTE — ED NOTES
[facility] - per Azam, no appropriate beds    [place] - per Suzanne Bauer, no beds    [facility] - per Pinky Almod[place], no appropriate beds    [facility] - per Tito Cruz, they have no beds    [facility] - per [place], no beds    [facility] - per Son, no beds      [facility] - per Dennys Johnstowns, possible discharge beds beds    [facility]-no beds per Kashmir Stapleton    [facility]-voice mail message left, requesting a return call    [place] -no beds per [employer] - Per Marielena Ashby, no beds    [facility] - no beds

## 2022-07-05 NOTE — ED NOTES
Spoke with patient and provided an update  Patient appears depressed, has poor eye contact, and responds with one word answers

## 2022-07-06 NOTE — CONSULTS
TeleConsultation - 222 Darius Bryant 57 y o  female MRN: [id number]  Unit/Bed#: Adrian Farrell 02 Encounter: [id number]        REQUIRED DOCUMENTATION:     1  This service was provided via Telemedicine  2  Provider located at [facility]   3  TeleMed provider: Travis Henderson MD   4  Identify all parties in room with patient during tele consult: Patient Only   5  Patient was then informed that this was a Telemedicine visit and that the exam was being conducted confidentially over secure lines  My office door was closed  No one else was in the room  Patient acknowledged consent and understanding of privacy and security of the Telemedicine visit, and gave us permission to have the assistant stay in the room in order to assist with the history and to conduct the exam   I informed the patient that I have reviewed their record in Epic and presented the opportunity for them to ask any questions regarding the visit today  The patient agreed to participate  Discussed with Dr Rachele Tautm       Assessment/Plan     Assessment:  Amina Curry is a 58 y/o female with PMH significant for COPD, Schizoaffective Disorder, Major Neurocognitive Impairment, and HLD that presented for SI  Patient with CMI denying any active SI/HI/AVH but with multiple documented endorsements of said symptoms as such recommend involuntary IP psychiatric admission for safety and stabilization as patient no longer agreeable to 201  Additionally recommend 1:1 for safety  Plan:   Risks, benefits and possible side effects of Medications:   Risks, benefits, and possible side effects of medications explained to patient and patient verbalizes understanding  Chief Complaint: " I am fine now"    History of Present Illness     Reason for Consult / Principal Problem: Psych Evaluation     Per Crisis Workers Note by Osmani Dalton: This writer met with the patient and introduced self and role   The patient presented to the ED today reporting auditory hallucinations, and suicidal ideations with a plan to jump in front of a moving vehicle  The patient was guarded during the assessment and appeared to be responding to internal stimuli  The patient was calm and cooperative and pleasant in responding  The patient recently moved to the area from the [address] [place] [address] and currently resides in a group home/nursing home ([facility]  The patient is having a hard time adapting, and has been feeling suicidal and hearing voices consistently for the past week  The voices are telling her that they want to hurt her and kill hurt, and they are also telling her to hurt herself  The patient stated that she is "often fearful and paranoid "  This writer inquired on if the patient would act out on her plan if she were to leave the hospital and the patient replied tearfully "yes " The patient denies visual hallucinations and delusions, but does experience auditory hallucinations and paranoia  The patient denies HI and SIB, but admits to current SI with a plan to jump in front of a moving vehicle  The patient has a history of inpatient hospital admissions for behavioral health, and has been diagnosed with Schizoaffective Disorder  The patient's previous psychiatrist when she lived in the [address] [place] [address] was Dr Lucas Chou  The patient's current residence takes care of her medications for her  The patient denies legal issues and substance abuse  The patient admits to smoking one pack of cigarettes per day  The patient stated that she is "depressed, anxious, scared, and fearful " The patient reports that both her appetite and sleep are fair  The patient admits to no past suicide attempts but does report a HX of fleeting SI  The patient is requesting to sign herself in voluntarily  Patient is a poor historian based on willingness to participate in clinical interview  Patient reports that she is "doing fine now" and that she was anxious and denied having endorsed any SI/AVH   Patient denied any active SI/HI/AVH and then terminated the interview after several mins of questions  Inpatient consult to Psychiatry  Consult performed by: Mary Rehman MD  Consult ordered by: Lyndon Pulido DO          Psychiatric Review Of Systems:  sleep: no  appetite changes: no  weight changes: no  energy/anergy: no  interest/pleasure/anhedonia: no  somatic symptoms: no  anxiety/panic: no  magalys: no  guilty/hopeless: no  self injurious behavior/risky behavior: no    Historical Information   Past Psychiatric History: In Patient Multiple IP admissions   Currently in treatment with Outpatient psychiatrist   Past Suicide attempts: Yes, wrist cutting   Past Violent behavior: Denied   Past Psychiatric medication trial: Multiple per chart review     Substance Abuse History: Denied   Use of Alcohol: denied    Longest clean time: weeks  History of IP/OP rehabilitation program: None   Smoking history: Denied   Use of Caffeine: denies use    Family Psychiatric History: Denied     Social History  Education: high school diploma/GED  Learning Disabilities: None  Marital history: single  Living arrangement, social support: The patient [facility] [address] [place] [address]  Occupational History: on permanent disability  Functioning Relationships: poor support system    Other Pertinent History: None    Traumatic History:   Abuse: None  Other Traumatic Events: None    Past Medical History:   Diagnosis Date    Abnormal weight loss     Anxiety     Benzodiazepine abuse ([facility] [address] )     Depression     Facial cellulitis     Malaise     Nicotine dependence     Psychiatric disorder     Suicidal ideation        Medical Review Of Systems:  Review of Systems    Meds/Allergies   all current active meds have been reviewed  Allergies   Allergen Reactions    Quetiapine     Penicillins Rash       Objective   Vital signs in last 24 hours:  HR:  [39-61] 61  Resp:  [16-18] 16  BP: ()/(56-66) 100/66      Intake/Output Summary (Last 24 hours) at 7/6/2022 1907  Last data filed at 7/6/2022 0250  Gross per 24 hour   Intake 2500 ml   Output --   Net 2500 ml       Mental Status Evaluation:  Appearance:  age appropriate   Behavior:  guarded and uncooperative   Speech:  normal pitch and normal volume   Mood:  irritable   Affect:  labile and mood-congruent   Language: naming objects   Thought Process:  normal   Thought Content:  normal   Perceptual Disturbances: None   Risk Potential: None   Sensorium:  person and place   Cognition:  recent and remote memory grossly intact   Consciousness:  alert    Attention: attention span and concentration were age appropriate   Intellect: within normal limits   Fund of Knowledge: awareness of current events: President   Insight:  limited   Judgment: limited   Muscle Strength and Tone: NFT   Gait/Station: normal gait/station   Motor Activity: no abnormal movements     Lab Results: Reviewed  Imaging Studies: Reviewed  EKG, Pathology, and Other Studies: Reviewed    Code Status: No Order  Advance Directive and Living Will:      Power of :    POLST:      Counseling / Coordination of Care  Total floor / unit time spent today 30 minutes  Greater than 50% of total time was spent with the patient and / or family counseling and / or coordination of care  A description of the counseling / coordination of care: Direct Patient care, Chart Review, and Documentation

## 2022-07-06 NOTE — ED NOTES
Spoke with Dr Farzad Tobias from [facility], inpatient hospitalization is recommended at this time  Bed search ongoing

## 2022-07-06 NOTE — ED NOTES
Patient awake, alert  Denies SI/HI  Denies VH/AH  Patient requested return home if placement cannot be made  Crisis aware       Quiana Mesa, DASH  07/06/22 5931

## 2022-07-06 NOTE — ED NOTES
Ne Suicide Risk Assessment deferred, as unable to assess while patient sleeping  Behavioral Health Assessment deferred as patient is sleeping and would benefit from additional rest   Vital signs deferred until patient awake, no signs or symptoms of respiratory distress at this time  Once patient is awake and able to participate, will complete assessments         Colby Olsen RN  07/06/22 3002

## 2022-07-06 NOTE — ED NOTES
Patient remains awake and alert, reports feeling worried  Medicated as appropriate with routine clonazepam  Patient not reporting any complaints  No S/S of distress  Awaiting psych eval for possible discharge home       Aram Frankel, RN  07/06/22 1401

## 2022-07-06 NOTE — ED NOTES
Spoke with patient, provided update  Patient now requesting to go home, given there are no network beds and that she is feeling better  Case discussed with ER MD, psychiatric consult placed

## 2022-07-06 NOTE — ED NOTES
Vital signs obtained and BP 85/61  Obtained manual reading of 90/60  Pt also requesting trazodone   Provider aware of low BP and medication request       Giovanny Ramos RN  07/05/22 2129

## 2022-07-06 NOTE — ED NOTES
Call placed to [facility], consult logged  Awaiting call back for completion of psychiatric consult

## 2022-07-07 NOTE — ED NOTES
Ne Suicide Risk Assessment deferred, as unable to assess while patient sleeping  Behavioral Health Assessment deferred as patient is sleeping and would benefit from additional rest   Vital signs deferred until patient awake, no signs or symptoms of respiratory distress at this time  Once patient is awake and able to participate, will complete assessments       [facility]  07/06/22 4223

## 2022-07-07 NOTE — ED NOTES
Patient is resting comfortably  No distress noted  On continues observation  Rohini Meeks RN  07/07/22 3583

## 2022-07-07 NOTE — ED NOTES
Patient is resting comfortably  No distress noted  On continues observation  Rohini Chapman RN  07/07/22 5503

## 2022-07-07 NOTE — ED NOTES
Bed search: Intakes - no OA beds available      [address] - per admission/Dave[facility], no beds available    [place] - per admission/ruthie Leon referral for tomorrow review    [facility] - per admission, no beds available    [place] - per admission/no beds available    [facility] - per admission, at capacity    [facility] - per admission/Azeb,no beds available    [facility] - per admission, no beds available    [facility] - per admission, no beds available    [facility] - per admission, no beds available

## 2022-07-07 NOTE — ED NOTES
Received pt sleeping comfortably , on continues monitoring  Rohini Blanco, [facility]  07/07/22 0022

## 2022-07-07 NOTE — ED CARE HANDOFF
Emergency Department Sign Out Note        Sign out and transfer of care from Dr Kamaljit Sanders  See Separate Emergency Department note  The patient, Meliza Cohen, was evaluated by the previous provider for SI  Workup Completed:  Uds, covid, etoh, cbc, cmp    ED Course / Workup Pending (followup):                                      ED Course as of 07/07/22 0654   Diana Kirkland Jul 03, 2022   0009 Received in sign out:     Pt is 201 for SI    Medically cleared: cbc, cmp, uds, covid, ETOH done  No issues during last shift    Bed search in progress     Tue Jul 05, 2022   0028 Received in sign out:     Pt is 201 for SI    Medically cleared: cbc, cmp, uds, covid, ETOH done  No issues during last shift    Bed search in progress       Wed Jul 06, 2022   0015 Received in sign out:     Pt is 201 for SI    Medically cleared: cbc, cmp, uds, covid, ETOH done    Patient needed IV fluids during last shift for low blood pressure, which patient has had due to hypovolemia the past   Patient has not been eating or drinking  Delta troponin is due now    No other issues during last shift    Bed search in progress   u Jul 07, 2022   0000 Received in sign out:     Pt is 201 for SI    Medically cleared: cbc, cmp, uds, covid, ETOH done    Patient needed IV fluids during last shift for low blood pressure, which patient has had due to hypovolemia the past   Patient has not been eating or drinking    Delta troponin is due now    No other issues during last shift    Bed search in progress     Procedures  MDM        Disposition  Final diagnoses:   Suicidal ideations   Auditory hallucinations     Time reflects when diagnosis was documented in both MDM as applicable and the Disposition within this note     Time User Action Codes Description Comment    7/2/2022  1:10 PM Rohit Hawkins Suicidal ideations     7/2/2022  1:10 PM Agueda Yoon Add [R44 0] Auditory hallucinations       ED Disposition     ED Disposition   Transfer to Behavioral Health    Condition   --    Date/Time   Sat Jul 2, 2022  1:10 PM    Comment   Meryl Garcia should be transferred out to [facility] and has been medically cleared  Follow-up Information    None       Patient's Medications   Discharge Prescriptions    No medications on file     No discharge procedures on file         ED Provider  Electronically Signed by     Cinthya Kwong MD  07/07/22 6874

## 2022-07-07 NOTE — ED NOTES
Ne Suicide Risk Assessment deferred, as unable to assess while patient sleeping  Behavioral Health Assessment deferred as patient is sleeping and would benefit from additional rest   Vital signs deferred until patient awake, no signs or symptoms of respiratory distress at this time  Once patient is awake and able to participate, will complete assessments       [facility]  07/06/22 8563

## 2022-07-07 NOTE — ED NOTES
Ne Suicide Risk Assessment deferred, as unable to assess while patient sleeping  Behavioral Health Assessment deferred as patient is sleeping and would benefit from additional rest   Vital signs deferred until patient awake, no signs or symptoms of respiratory distress at this time  Once patient is awake and able to participate, will complete assessments       DASH Harris  07/07/22 0989

## 2022-07-08 NOTE — TELEMEDICINE
TeleConsultation - 222 Darius Bryant 57 y o  female MRN: [id number]  Unit/Bed#: Adrian Farrell 02 Encounter: [id number]        REQUIRED DOCUMENTATION:     1  This service was provided via Telemedicine  2  Provider located at [facility]   3  TeleMed provider: Natalie Conn MD   4  Identify all parties in room with patient during tele consult:  pt  5  Patient was then informed that this was a Telemedicine visit and that the exam was being conducted confidentially over secure lines  My office door was closed  No one else was in the room  Patient acknowledged consent and understanding of privacy and security of the Telemedicine visit, and gave us permission to have the assistant stay in the room in order to assist with the history and to conduct the exam   I informed the patient that I have reviewed their record in Epic and presented the opportunity for them to ask any questions regarding the visit today  The patient agreed to participate  Assessment/Plan     Assessment:  William Lowe is a 56 y/o female with PMH significant for COPD, Schizoaffective Disorder, Major Neurocognitive Impairment, and HLD that presented for SI  Plan:   Risks, benefits and possible side effects of Medications:   Risks, benefits, and possible side effects of medications explained to patient and patient verbalizes understanding  The patient is without any further suicidal ideation as well as any hallucinations and is appropriately stabilized for return to nursing home at this time  There is no psychiatric contraindication for her return to the nursing facility  The patient is in agreement this plan  Recommend no changes in the current psychiatric medication regimen  The patient should have outpatient psychiatric follow-up for further diagnostic evaluation and medication management through the nursing home facility      Chief Complaint: "I feel fine    History of Present Illness     Reason for Consult / Nat Christiansen Problem:  Psychiatric evaluation    This is a follow-up psychiatry consult to initial consult provider July 6, 2022  Please see that consult for additional details  In summary from that consult, Josie Lala is a 56 y/o female with PMH significant for COPD, Schizoaffective Disorder, Major Neurocognitive Impairment, and HLD that presented for SI  The patient presented to the ED today reporting auditory hallucinations, and suicidal ideations with a plan to jump in front of a moving vehicle  The patient was guarded during the assessment and appeared to be responding to internal stimuli   The patient was calm and cooperative and pleasant in responding   The patient recently moved to the area from the [address] [place] [address] and currently resides in a group home/nursing home ([facility])   The patient is having a hard time adapting, and has been feeling suicidal and hearing voices consistently for the past week  The voices are telling her that they want to hurt her and kill hurt, and they are also telling her to hurt herself   The patient stated that she is "often fearful and paranoid "  This writer inquired on if the patient would act out on her plan if she were to leave the hospital and the patient replied tearfully "yes " The patient denies visual hallucinations and delusions, but does experience auditory hallucinations and paranoia  The patient denies HI and SIB, but admits to current SI with a plan to jump in front of a moving vehicle  The patient has a history of inpatient hospital admissions for behavioral health, and has been diagnosed with Schizoaffective Disorder  The patient's previous psychiatrist when she lived in the [address] [place] [address] was Dr Rosetta Daniels patient's current residence takes care of her medications for her  The patient denies legal issues and substance abuse   The patient admits to smoking one pack of cigarettes per day   The patient stated that she is "depressed, anxious, scared, and fearful " The patient reports that both her appetite and sleep are fair  The patient admits to no past suicide attempts but does report a HX of fleeting SI  The patient is requesting to sign herself in voluntarily  Patient is a poor historian based on willingness to participate in clinical interview  Patient reports that she is "doing fine now" and that she was anxious and denied having endorsed any SI/AVH  Patient denied any active SI/HI/AVH and then terminated the interview after several mins of questions  Emergency department course since last consult:  Patient has consistently denied any suicidal homicidal ideation as well as any hallucinations  She is very pleasant cooperative and has presented as calm and relaxed  She has not appeared to be attending to internal stimuli  She has been compliant with care  Mental status examination:  The patient presents alert and [facility] to, person and situation  She volunteered she is feeling much better  She reports euthymic mood which was congruent with affect sensorium is clear  Speech was unremarkable  She is very pleasant cooperative  She denies any further suicidal ideation  She denies homicidal ideation  She denies any further hallucinations  She states she feels very call returned to the nursing home which served as our this time  She states that if she were to experience any recurrence of suicidal ideation or hallucinations that she will immediately alert the staff requesting assistance  Insight and judgment appear to be a baseline at this time      Consult to Psychiatry  Consult performed by: Anusha Phelps MD  Consult ordered by: Thedore Bamberger, DO          Past Medical History:   Diagnosis Date    Abnormal weight loss     Anxiety     Benzodiazepine abuse ([facility] [address] [facility]     Depression     Facial cellulitis     Malaise     Nicotine dependence     Psychiatric disorder     Suicidal ideation        Medical Review Of Systems:  Review of Systems    Meds/Allergies   all current active meds have been reviewed  Allergies   Allergen Reactions    Quetiapine     Penicillins Rash       Objective   Vital signs in last 24 hours:  Temp:  [97 3 °F (36 3 °C)] 97 3 °F (36 3 °C)  HR:  [72-78] 72  Resp:  [18] 18  BP: (109-122)/(75-86) 109/75    No intake or output data in the 24 hours ending 07/08/22 9377      Lab Results:  Reviewed  Imaging Studies:  Reviewed  EKG, Pathology, and Other Studies:  Reviewed    Code Status: No Order  Advance Directive and Living Will:      Power of :    POLST:      Counseling / Coordination of Care  Total floor / unit time spent today 30 minutes  Greater than 50% of total time was spent with the patient and / or family counseling and / or coordination of care   A description of the counseling / coordination of care:  Chart review, patient evaluation, coordination communication with staff, nursing and provider

## 2022-07-08 NOTE — ED NOTES
Ne Suicide Risk Assessment deferred, as unable to assess while patient sleeping  Behavioral Health Assessment deferred as patient is sleeping and would benefit from additional rest   Vital signs deferred until patient awake, no signs or symptoms of respiratory distress at this time  Once patient is awake and able to participate, will complete assessments       [address]  07/08/22 0813

## 2022-07-08 NOTE — ED CARE HANDOFF
Emergency Department Sign Out Note        Sign out and transfer of care from Dr Mari Craw  See Separate Emergency Department note  The patient, Meliza Cohen, was evaluated by the previous provider for SI  Workup Completed:  Cbc, cmp, uds, covid, etoh    ED Course / Workup Pending (followup):                                      ED Course as of 07/08/22 0639   Diana Kirkland Jul 03, 2022   0009 Received in sign out:     Pt is 201 for SI    Medically cleared: cbc, cmp, uds, covid, ETOH done  No issues during last shift    Bed search in progress     Tue Jul 05, 2022   0028 Received in sign out:     Pt is 201 for SI    Medically cleared: cbc, cmp, uds, covid, ETOH done  No issues during last shift    Bed search in progress       Wed Jul 06, 2022   0015 Received in sign out:     Pt is 201 for SI    Medically cleared: cbc, cmp, uds, covid, ETOH done    Patient needed IV fluids during last shift for low blood pressure, which patient has had due to hypovolemia the past   Patient has not been eating or drinking  Delta troponin is due now    No other issues during last shift    Bed search in progress   Thu Jul 07, 2022   0000 Received in sign out:     Pt is 201 for SI    Medically cleared: cbc, cmp, uds, covid, ETOH done    Patient needed IV fluids during last shift for low blood pressure, which patient has had due to hypovolemia the past   Patient has not been eating or drinking  Delta troponin is due now    No other issues during last shift    Bed search in progress   Fri Jul 08, 2022   0030 Received in sign out:     Pt is 201 for SI    Medically cleared: cbc, cmp, uds, covid, ETOH done    Patient needed IV fluids during last shift for low blood pressure, which patient has had due to hypovolemia the past   Patient has not been eating or drinking    Delta troponin is due now    No other issues during last shift    Bed search in progress     Procedures  MDM        Disposition  Final diagnoses:   Suicidal ideations   Auditory hallucinations     Time reflects when diagnosis was documented in both MDM as applicable and the Disposition within this note     Time User Action Codes Description Comment    7/2/2022  1:10 PM William Ramirez Add [F37 983] Suicidal ideations     7/2/2022  1:10 PM William Ramirez Add [R44 0] Auditory hallucinations       ED Disposition     ED Disposition   Transfer to [address] [place] [address]   --    Date/Time   Sat Jul 2, 2022  1:10 PM    Comment   Zhanna Houser should be transferred out to [facility] and has been medically cleared  Follow-up Information    None       Patient's Medications   Discharge Prescriptions    No medications on file     No discharge procedures on file         ED Provider  Electronically Signed by     Rosa De Anda MD  07/08/22 2018

## 2022-07-08 NOTE — ED NOTES
Patient is accepted at [address]  Patient is accepted by Dr Maximino Rudolph per Holly Najera  Transportation is arranged with [facility]  Transportation is scheduled for **  Patient may go to the floor after 7pm           Nurse report is to be called to ** prior to patient transfer

## 2022-07-08 NOTE — ED NOTES
Crisis contacted Balj[facility] and cancelled transport scheduled [email or web address] to [facility]

## 2022-07-08 NOTE — ED PROVIDER NOTES
Patient remains cooperative in stable this point  Pleasantly confused  History of depression  She has now been here in extensive amount of time  The initial admission to the psych unit in [facility] had been canceled due to Julián, and then her transfer to [facility] delayed due to the inability to discharge the patient from [facility]   At this point the patient has been here over 100 hours  She is no longer suicidal or homicidal   She is seen by the psychiatrist and field safe for her to return back to the nursing home  She is in agreement with this  Her exam remains unremarkable with heart of that is regular lungs are clear and vital signs have been stable  Will proceed with discharge  Discharge will will be dependent on potential for transfer back to the her nursing home       Xiao Su DO  07/09/22 0106

## 2022-07-08 NOTE — ED NOTES
Crisis contacted [facility] and placed psychiatric consult requested by attending Carlton CASTANEDA prepared  Awaiting on call psychiatrist to call in

## 2022-07-08 NOTE — ED NOTES
Ne Suicide Risk Assessment deferred, as unable to assess while patient sleeping  Behavioral Health Assessment deferred as patient is sleeping and would benefit from additional rest   Vital signs deferred until patient awake, no signs or symptoms of respiratory distress at this time  Once patient is awake and able to participate, will complete assessments       [address]  07/08/22 2476

## 2022-07-08 NOTE — DISCHARGE INSTRUCTIONS
Continue chronic medications  Be sure she follows up with Psychiatry or primary care to adjust medications as needed

## 2022-07-09 NOTE — ED NOTES
Crisis contacted [employer] [facility], [address] informing staff about pt's discharge per pt request  No transport can be arranged at this hour  Crisis informed staff transportation will be arranged from ED

## 2022-07-09 NOTE — ED NOTES
Ne Suicide Risk Assessment deferred, as unable to assess while patient sleeping  Behavioral Health Assessment deferred as patient is sleeping and would benefit from additional rest   Vital signs deferred until patient awake, no signs or symptoms of respiratory distress at this time  Once patient is awake and able to participate, will complete assessments       [address]  07/08/22 5575

## 2022-07-09 NOTE — ED NOTES
"[facility] Suicide Risk Assessment deferred, as unable to assess while patient sleeping  Behavioral Health Assessment deferred as patient is sleeping and would benefit from additional rest   Vital signs deferred until patient awake, no signs or symptoms of respiratory distress at this time  Once patient is awake and able to participate, will complete assessments         Opal Delaney RN  37/82/58 0657

## 2022-08-22 PROBLEM — G93.40 ENCEPHALOPATHY ACUTE: Status: ACTIVE | Noted: 2022-01-01

## 2022-08-22 PROBLEM — R93.5 ABNORMAL CT OF THE ABDOMEN: Status: ACTIVE | Noted: 2022-01-01

## 2022-08-22 PROBLEM — R65.21 SEPTIC SHOCK (HCC): Status: ACTIVE | Noted: 2022-01-01

## 2022-08-22 PROBLEM — R65.10 SIRS (SYSTEMIC INFLAMMATORY RESPONSE SYNDROME) (HCC): Status: ACTIVE | Noted: 2022-01-01

## 2022-08-22 PROBLEM — E87.2 LACTIC ACIDOSIS: Status: ACTIVE | Noted: 2022-01-01

## 2022-08-22 PROBLEM — N17.9 AKI (ACUTE KIDNEY INJURY) (HCC): Status: ACTIVE | Noted: 2022-01-01

## 2022-08-22 PROBLEM — S22.49XA FRACTURE OF MULTIPLE RIBS: Status: ACTIVE | Noted: 2022-01-01

## 2022-08-22 PROBLEM — E87.20 LACTIC ACIDOSIS: Status: ACTIVE | Noted: 2022-01-01

## 2022-08-22 PROBLEM — R73.9 HYPERGLYCEMIA: Status: ACTIVE | Noted: 2022-01-01

## 2022-08-22 PROBLEM — A41.9 SEPTIC SHOCK (HCC): Status: ACTIVE | Noted: 2022-01-01

## 2022-08-22 PROBLEM — S22.20XA STERNAL FRACTURE: Status: ACTIVE | Noted: 2022-01-01

## 2022-08-22 PROBLEM — T14.8XXA HEMATOMA: Status: ACTIVE | Noted: 2022-01-01

## 2022-08-22 PROBLEM — I46.9 CARDIAC ARREST (HCC): Status: ACTIVE | Noted: 2022-01-01

## 2022-08-22 PROBLEM — J96.01 ACUTE RESPIRATORY FAILURE WITH HYPOXIA (HCC): Status: ACTIVE | Noted: 2022-01-01

## 2022-08-22 NOTE — ED NOTES
Pt arrived via EMS with marissa on  PT received 3 epi prehospital  EMS reports pt aspirated at the nursing facility and started coding while they were there  Upon arrival to facility pt converted to sinus tachycardia on the monitor       Simon Juarez RN  08/22/22 1749

## 2022-08-22 NOTE — ASSESSMENT & PLAN NOTE
· Likely in the setting of cardiac arrest  · Baseline Cr around 0 82  · Continue IVF resuscitation   · Monitor I/O and renal indices

## 2022-08-22 NOTE — PROGRESS NOTES
Vancomycin Assessment    Jennifer Georges is a 60 y o  female who is currently ordered on  vancomycin 750mg IV Q12hrs for Pneumonia     Relevant clinical data and objective history reviewed:  Creatinine   Date Value Ref Range Status   08/22/2022 1 11 0 60 - 1 30 mg/dL Final     Comment:     Standardized to IDMS reference method   07/05/2022 0 85 0 60 - 1 30 mg/dL Final     Comment:     Standardized to IDMS reference method   07/02/2022 0 77 0 60 - 1 30 mg/dL Final     Comment:     Standardized to IDMS reference method   12/22/2015 0 78 0 60 - 1 30 mg/dL Final     Comment:     Standardized to IDMS reference method   08/27/2015 0 6 0 6 - 1 0 mg/dL Final     Comment:     Standardized to IDMS reference method   04/09/2015 0 6 0 6 - 1 0 mg/dL Final     Comment:     Standardized to IDMS reference method     BP (!) 178/88   Pulse 85   Temp (!) 93 02 °F (33 9 °C)   Resp (!) 23   SpO2 99%   No intake/output data recorded  Lab Results   Component Value Date/Time    BUN 8 08/22/2022 01:17 PM    BUN 17 12/22/2015 02:18 PM    WBC 9 34 08/22/2022 01:17 PM    WBC 8 98 12/22/2015 02:18 PM    HGB 12 4 08/22/2022 01:17 PM    HGB 13 1 12/22/2015 02:18 PM    HCT 43 4 08/22/2022 01:17 PM    HCT 39 7 12/22/2015 02:18 PM     (H) 08/22/2022 01:17 PM    MCV 84 12/22/2015 02:18 PM     08/22/2022 01:17 PM     12/22/2015 02:18 PM     Temp Readings from Last 3 Encounters:   08/22/22 (!) 93 02 °F (33 9 °C)   07/08/22 (!) 97 3 °F (36 3 °C)   02/22/22 (!) 97 2 °F (36 2 °C) (Temporal)     Vancomycin Days of Therapy: 1    Assessment/Plan  The patient is currently on vancomycin utilizing scheduled dosing based on actual body weight  Baseline risks associated with therapy include: concomitant nephrotoxic medications and dehydration  The patient is currently ordered on 750mg IV Q12hrs and after clinical evaluation will be changed to 1000mg IV Q24hrs    Pharmacy will also follow closely for s/sx of nephrotoxicity, infusion reactions, and appropriateness of therapy  BMP and CBC will be ordered per protocol  Plan for trough as patient approaches steady state, prior to the 4th  dose at approximately 1430 on 8/25/22  Due to infection severity, will target a trough of 15-20 (appropriate for most indications)   Pharmacy will continue to follow the patients culture results and clinical progress daily      Rae Horner, Pharmacist

## 2022-08-22 NOTE — ED PROCEDURE NOTE
Procedure  Central Line    Date/Time: 8/22/2022 1:54 PM  Performed by: Kianna Wood PA-C  Authorized by: Kianna Wood PA-C     Patient location:  ED  Consent:     Consent obtained:  Emergent situation  Universal protocol:     Patient identity confirmed:  Arm band  Pre-procedure details:     Hand hygiene: Hand hygiene performed prior to insertion      Sterile barrier technique: All elements of maximal sterile technique followed      Skin preparation:  2% chlorhexidine    Skin preparation agent: Skin preparation agent completely dried prior to procedure    Indications:     Central line indications: medications requiring central line, hemodynamic monitoring and no peripheral vascular access    Anesthesia (see MAR for exact dosages): Anesthesia method:  None  Procedure details:     Location:  Right femoral    Vessel type: vein      Laterality:  Right    Patient position:  Flat    Catheter type:  Triple lumen    Catheter size:  7 Fr    Ultrasound guidance: yes      Ultrasound image availability:  Not obtained due to urgency    Sterile ultrasound techniques: Sterile gel and sterile probe covers were used      Number of attempts:  1    Successful placement: yes    Post-procedure details:     Post-procedure:  Dressing applied and line sutured    Assessment:  Blood return through all ports, no pneumothorax on x-ray and placement verified by x-ray    Patient tolerance of procedure:   Tolerated well, no immediate complications                     Kianna Wood PA-C  08/22/22 1356

## 2022-08-22 NOTE — ASSESSMENT & PLAN NOTE
· Concern for choking incident leading to hypoxic respiratory failure and cardiac arrest, now intubated post arrest for airway protection  · Continue ACVC 28/350/40/6, titrate FiO2 for SpO2>88  · Start abx cefepime and vanco with concern for aspiration   · Frequent suctioning for pulmonary hygiene, start vent bundle    · Hold sedation at this time given poor neuro exam

## 2022-08-22 NOTE — ASSESSMENT & PLAN NOTE
· Present on arrival to ED, tachycardia, hypothermia, lactic acidosis   · Suspect pulmonary source in the setting of aspiration  · Will r/o UTI with UA and intra abdominal source with CT AP   · Start BS abx cefepime, vanc  · Follow up on BC, send sputum culture, MRSA   · Monitor WBC, fever curve, procal

## 2022-08-22 NOTE — ASSESSMENT & PLAN NOTE
· Multifactorial in the setting of cardiac arrest and sepsis  · Ongoing IVF resuscitation  · Follow up on CT CAP   · Trend until clear

## 2022-08-22 NOTE — NURSING NOTE
[id number] Pt arrived in ICU 4, arctic sun initiated post code  Intubated no sedation running  Rt 3L fem patent  No purposeful response to stimuli  See flowsheets for full assessment details  Le patent and draining clear urine, 1500 ml noted in drainage bag  Pt hypertensive and overbreathing vent  Prop gtt started for vent synchrony  Basilio Marin at bedside  Art line placed  Per Yudy, Pt to rewarm 8/23 @ 1400

## 2022-08-22 NOTE — ED NOTES
Patient transported to [address] [place] [address] Drive Kameron Dupont RN  08/22/22 2813

## 2022-08-22 NOTE — ED PROVIDER NOTES
History  Chief Complaint   Patient presents with    Cardiac Arrest     Patient is a 40-year-old female with history of anxiety, psychiatric disease that presents for evaluation of cardiac arrest   Patient be and apparently feel weak was not acting normally while eating  EMS arrived proximally 10 minutes after non 1 call and found the patient had regular respirations and was in cardiac arrest   CPR was started that time and patient was successfully intubated with the 6 0 ET tube  Patient was given 3 rounds of epinephrine pre-hospital   She was in PE a rest   During intubation EMS reports that she had a large food bolus in her airway raising concern for possible choking episode  Here, we followed ACLS protocol  Airway was intubated  Patient had bilateral breath sounds  Intact pulses with CPR, otherwise pulseless  GCS 3 T  Patient exposed  She was in [place] arrest and after 1 round of epinephrine she had return of spontaneous circulation  Patient was immediately started on Levophed and subsequently vasopressin secondary to post Rosc hypotension  Patient was packed in ice to help with post cardiac arrest hypothermia  After [place]i, patient did receive 1 bicarbonate to aid with acidosis related to cardiac arrest   A nonsterile femoral line was placed by ALLY Wray  Patient also started on antibiotics  Discussed with critical care who accepted patient for admission  There was no family here during the cardiac arrest     Advised respiratory to turn up respiratory rate to help with acidosis  Prior to Admission Medications   Prescriptions Last Dose Informant Patient Reported? Taking?    Aspirin 81 MG CAPS   No No   Sig: Take 81 mg by mouth daily   Nutritional Supplements (Ensure Active) LIQD   Yes No   Sig: Take 0 8 g by mouth   SM Aspirin Adult Low Strength 81 MG EC tablet  Outside Facility (Specify) No No   Si TAB ORALLY EVERY MORNING DX: STROKE PREVENTION   alclomethasone (ACLOVATE) 0 05 % cream No No   Sig: Apply topically 2 to 3 times a day to affected areas of the ears for up to 2 weeks     Patient not taking: No sig reported   artificial tear (LUBRIFRESH P M ) 83-15 % ophthalmic ointment  Outside Facility (Specify) Yes No   Sig: daily at bedtime   atorvastatin (LIPITOR) 10 mg tablet   No No   Sig: Take 1 tablet (10 mg total) by mouth daily at bedtime Hyperlipidemia   benztropine (COGENTIN) 1 mg tablet  Outside Facility (Specify) Yes No   clonazePAM (KlonoPIN) 1 mg tablet  Outside Facility (Specify) Yes No   Sig: Take 1 mg by mouth 4 (four) times a day   docusate sodium (COLACE) 100 mg capsule   No No   Sig: Take 1 capsule (100 mg total) by mouth 2 (two) times a day as needed for constipation   ferrous sulfate 324 (65 Fe) mg   No No   Si TAB ORALLY EVERY MORNING BEFORE BREAKFAST DX:ANEMIA   hydrOXYzine HCL (ATARAX) 25 mg tablet  Outside Facility (Specify) No No   Sig: Take 1 tablet (25 mg total) by mouth every 6 (six) hours as needed for itching   magnesium oxide (MAG-OX) 400 mg tablet   No No   Sig: Take one tab orally twice daily   melatonin 3 mg  Outside Facility (Specify) Yes No   Sig: Take 6 mg by mouth   oxybutynin (DITROPAN) 5 mg tablet   No No   Sig: Take one tab orally twice daily   pantoprazole (PROTONIX) 40 mg tablet   No No   Sig: Take 1 tablet (40 mg total) by mouth every morning GERD   risperiDONE (RisperDAL) 1 mg tablet   Yes No   Sig: Take 1 mg by mouth   sertraline (ZOLOFT) 100 mg tablet  Outside Facility (Specify) Yes No   Sig: Take 150 mg by mouth 2 (two) times a day    tolnaftate (TINACTIN) 1 % cream  Outside Facility (Specify) Yes No   Sig: Apply topically 2 (two) times a day     Patient not taking: Reported on 2022   traZODone (DESYREL) 150 mg tablet  Outside Facility (Specify) Yes No   Sig: Take 150 mg by mouth daily at bedtime    ziprasidone (GEODON) 40 mg capsule  Outside Facility (Specify) Yes No   Sig: Take 80 mg by mouth 2 (two) times a day 20mg in morning 60mg at bedtime    Patient not taking: Reported on 7/2/2022      Facility-Administered Medications: None       Past Medical History:   Diagnosis Date    Abnormal weight loss     Anxiety     Benzodiazepine abuse ([address] )     Depression     Facial cellulitis     Malaise     Nicotine dependence     Psychiatric disorder     Suicidal ideation        Past Surgical History:   Procedure Laterality Date    HIP SURGERY Right        Family History   Problem Relation Age of Onset    Diabetes Mother     Stroke Mother         Syndrome    Heart attack Father     Other Brother         Intellectual Disabilities    No Known Problems Sister     No Known Problems Maternal Grandmother     Breast cancer Paternal Grandmother 68    Breast cancer Maternal Aunt 39    No Known Problems Maternal Aunt     No Known Problems Maternal Aunt     Colon cancer Neg Hx     Ovarian cancer Neg Hx     Cervical cancer Neg Hx     Uterine cancer Neg Hx      I have reviewed and agree with the history as documented  E-Cigarette/Vaping    E-Cigarette Use Never User      E-Cigarette/Vaping Substances    Nicotine No     THC No     CBD No     Flavoring No     Other No     Unknown No      Social History     Tobacco Use    Smoking status: Current Every Day Smoker     Packs/day: 1 00     Years: 20 00     Pack years: 20 00     Start date: 1977    Smokeless tobacco: Never Used    Tobacco comment: current every day smoker per [employer]   Vaping Use    Vaping Use: Never used   Substance Use Topics    Alcohol use: No    Drug use: No       Review of Systems   Unable to perform ROS: Intubated       Physical Exam  Physical Exam  Constitutional:       Comments: Unresponsive   HENT:      Head: Atraumatic     Eyes:      Comments: 5 mm fixed and dilated   Neck:      Comments: Full range of motion no rigidity  Cardiovascular:      Comments: No murmur rub or gallop after Rosc  Pulmonary:      Comments: Lungs are clear bilaterally  Abdominal: Comments: No ecchymosis or tenderness noted   Musculoskeletal:      Comments: Full range of motion of all 4 extremities without evidence of trauma   Skin:     Comments: Cool mottled   Neurological:      Comments: GCS 3 T   Psychiatric:      Comments: Unresponsive         Vital Signs  ED Triage Vitals   Temperature Pulse Respirations Blood Pressure SpO2   08/22/22 1345 08/22/22 1318 08/22/22 1318 08/22/22 1315 08/22/22 1318   (!) 94 64 °F (34 8 °C) (!) 111 18 92/50 99 %      Temp src Heart Rate Source Patient Position - Orthostatic VS BP Location FiO2 (%)   -- -- -- -- 08/22/22 1501       50      Pain Score       --                  Vitals:    08/22/22 1420 08/22/22 1425 08/22/22 1500 08/22/22 1515   BP: (!) 178/88  (!) 206/98 155/82   Pulse: 88 85 69 65         Visual Acuity      ED Medications  Medications   sodium chloride 0 9 % bolus 1,000 mL (0 mL Intravenous Hold 8/22/22 1348)   NOREPINEPHRINE 4 MG  ML NSS (CMPD ORDER) infusion (15 mcg/min Intravenous Rate/Dose Change 8/22/22 1409)   magnesium sulfate 2 g/50 mL IVPB (premix) 2 g (2 g Intravenous New Bag 8/22/22 1340)   vasopressin (PITRESSIN) 20 Units in sodium chloride 0 9 % 100 mL infusion (0 04 Units/min Intravenous New Bag 8/22/22 1326)   sodium chloride 0 9 % bolus 1,000 mL (0 mL Intravenous Hold 8/22/22 1348)   sodium chloride 0 9 % bolus 1,000 mL (1,000 mL Intravenous New Bag 8/22/22 1410)   multi-electrolyte (ISOLYTE-S PH 7 4) bolus 1,000 mL (has no administration in time range)   enoxaparin (LOVENOX) subcutaneous injection 40 mg (has no administration in time range)   chlorhexidine (PERIDEX) 0 12 % oral rinse 15 mL (has no administration in time range)   cefepime (MAXIPIME) IVPB (premix in dextrose) 2,000 mg 50 mL (has no administration in time range)   busPIRone (BUSPAR) tablet 15 mg (has no administration in time range)   fentanyl citrate (PF) 100 MCG/2ML 50 mcg (has no administration in time range)   acetaminophen (TYLENOL) tablet 975 mg (has no administration in time range)   insulin regular (HumuLIN R,NovoLIN R) 1 Units/mL in sodium chloride 0 9 % 100 mL infusion (has no administration in time range)   vancomycin (VANCOCIN) IVPB (premix in dextrose) 1,000 mg 200 mL (has no administration in time range)   multi-electrolyte (PLASMALYTE-A/ISOLYTE-S PH 7 4) IV solution (has no administration in time range)   pantoprazole (PROTONIX) injection 40 mg (has no administration in time range)   norepinephrine (LEVOPHED) 4 mg in sodium chloride 0 9 % 250 mL infusion (10 mcg/min Intravenous New Bag 8/22/22 1315)   norepinephrine (LEVOPHED) 4 mg in sodium chloride 0 9 % 250 mL infusion (20 mcg/min Intravenous New Bag 8/22/22 1317)   vasopressin (PITRESSIN) 20 UNIT/ML injection **ADS Override Pull** (  Override Pull 8/22/22 1349)   sodium bicarbonate 8 4 % injection (50 mEq Intravenous Given 8/22/22 1321)   sodium chloride 0 9 % bolus (0 mL  Stopped 8/22/22 1347)   sodium chloride 0 9 % bolus (0 mL  Stopped 8/22/22 1347)   norepinephrine (LEVOPHED) 4 mg in sodium chloride 0 9 % 250 mL infusion (30 mcg/min Intravenous New Bag 8/22/22 1322)   EPINEPHrine (ADRENALIN) injection (1 mg Intravenous Given 8/22/22 1323)   piperacillin-tazobactam (ZOSYN) IVPB 3 375 g (0 g Intravenous Stopped 8/22/22 1413)   iohexol (OMNIPAQUE) 350 MG/ML injection (SINGLE-DOSE) 80 mL (80 mL Intravenous Given 8/22/22 1441)       Diagnostic Studies  Results Reviewed     Procedure Component Value Units Date/Time    HS Troponin I 4hr [[id number]]     Lab Status: No result Specimen: Blood     Lactic acid 2 Hours [[id number]] Collected: 08/22/22 1505    Lab Status: In process Specimen: Blood from Central Venous Line Updated: 08/22/22 1509    MRSA culture [[id number]] Collected: 08/22/22 1505    Lab Status: In process Specimen: Nares from Nose Updated: 08/22/22 1508    HS Troponin I 2hr [[id number]] Collected: 08/22/22 1505    Lab Status:  In process Specimen: Blood from Central Venous Line Updated: 08/22/22 1508    Procalcitonin [[id number]]  (Normal) Collected: 08/22/22 1317    Lab Status: Final result Specimen: Blood from Arm, Right Updated: 08/22/22 1455     Procalcitonin <0 05 ng/ml     Rapid drug screen, urine [[id number]]     Lab Status: No result Specimen: Urine     Protime-INR [[id number]]  (Abnormal) Collected: 08/22/22 1317    Lab Status: Final result Specimen: Blood from Arm, Right Updated: 08/22/22 1422     Protime 17 7 seconds      INR 1 46    Sputum culture and Gram stain [[id number]]     Lab Status: No result Specimen: Sputum     Rapid drug screen, urine [[id number]]  (Normal) Collected: 08/22/22 1344    Lab Status: Final result Specimen: Urine, Catheter Updated: 08/22/22 1419     Amph/Meth UR Negative     Barbiturate Ur Negative     Benzodiazepine Urine Negative     Cocaine Urine Negative     Methadone Urine Negative     Opiate Urine Negative     PCP Ur Negative     THC Urine Negative     Oxycodone Urine Negative    Narrative:      FOR MEDICAL PURPOSES ONLY  IF CONFIRMATION NEEDED PLEASE CONTACT THE LAB WITHIN 5 DAYS  Drug Screen Cutoff Levels:  AMPHETAMINE/METHAMPHETAMINES  1000 ng/mL  BARBITURATES     200 ng/mL  BENZODIAZEPINES     200 ng/mL  COCAINE      300 ng/mL  METHADONE      300 ng/mL  OPIATES      300 ng/mL  PHENCYCLIDINE     25 ng/mL  THC       50 ng/mL  OXYCODONE      100 ng/mL    Blood culture #1 [[id number]] Collected: 08/22/22 1403    Lab Status: In process Specimen: Blood from Arm, Left Updated: 08/22/22 1411    Lactic acid [[id number]]  (Abnormal) Collected: 08/22/22 1317    Lab Status: Final result Specimen: Blood from Arm, Right Updated: 08/22/22 1408     LACTIC ACID 13 2 mmol/L     Narrative:      Result may be elevated if tourniquet was used during collection      Manual Differential(PHLEBS Do Not Order) [[id number]]  (Abnormal) Collected: 08/22/22 1317    Lab Status: Final result Specimen: Blood from Arm, Right Updated: 08/22/22 1404     Segmented % 43 %      Bands % 2 % Lymphocytes % 50 %      Monocytes % 5 %      Eosinophils, % 0 %      Basophils % 0 %      Absolute Neutrophils 4 20 Thousand/uL      Lymphocytes Absolute 4 67 Thousand/uL      Monocytes Absolute 0 47 Thousand/uL      Eosinophils Absolute 0 00 Thousand/uL      Basophils Absolute 0 00 Thousand/uL      Total Counted --     RBC Morphology Normal     Platelet Estimate Adequate    CBC and differential [[id number]]  (Abnormal) Collected: 08/22/22 1317    Lab Status: Final result Specimen: Blood from Arm, Right Updated: 08/22/22 1404     WBC 9 34 Thousand/uL      RBC 4 35 Million/uL      Hemoglobin 12 4 g/dL      Hematocrit 43 4 %       fL      MCH 28 5 pg      MCHC 28 6 g/dL      RDW 15 7 %      MPV 11 0 fL      Platelets 576 Thousands/uL     Narrative: This is an appended report  These results have been appended to a previously verified report  Ethanol [[id number]]  (Normal) Collected: 08/22/22 1317    Lab Status: Final result Specimen: Blood from Arm, Right Updated: 08/22/22 1402     Ethanol Lvl <10 mg/dL     HS Troponin 0hr (reflex protocol) [[id number]]  (Normal) Collected: 08/22/22 1317    Lab Status: Final result Specimen: Blood from Arm, Right Updated: 08/22/22 1401     hs TnI 0hr 41 ng/L     FLU/RSV/COVID - if FLU/RSV clinically relevant [[id number]] Collected: 08/22/22 1356    Lab Status:  In process Specimen: Nares from Nose Updated: 08/22/22 1400    Blood gas, arterial [[id number]]  (Abnormal) Collected: 08/22/22 1343    Lab Status: Final result Specimen: Blood, Arterial from Line, Arterial Updated: 08/22/22 1400     pH, Arterial 7 059     pCO2, Arterial 45 2 mm Hg      pO2, Arterial 572 0 mm Hg      HCO3, Arterial 12 5 mmol/L      Base Excess, Arterial -17 4 mmol/L      O2 Content, Arterial 18 7 mL/dL      O2 HGB,Arterial  97 1 %      SOURCE Line, Arterial    B-Type Natriuretic Peptide(BNP) AN, CA, EA Campuses Only [[id number]]  (Abnormal) Collected: 08/22/22 1317    Lab Status: Final result Specimen: Blood from Arm, Right Updated: 08/22/22 1400      pg/mL     UA w Reflex to Microscopic w Reflex to Culture [[id number]] Collected: 08/22/22 1357    Lab Status: No result Specimen: Urine, Clean Catch     Ammonia [[id number]]  (Abnormal) Collected: 08/22/22 1317    Lab Status: Final result Specimen: Blood from Arm, Right Updated: 08/22/22 1352     Ammonia 81 umol/L     Comprehensive metabolic panel [[id number]]  (Abnormal) Collected: 08/22/22 1317    Lab Status: Final result Specimen: Blood from Arm, Right Updated: 08/22/22 1352     Sodium 137 mmol/L      Potassium 4 2 mmol/L      Chloride 101 mmol/L      CO2 15 mmol/L      ANION GAP 21 mmol/L      BUN 8 mg/dL      Creatinine 1 11 mg/dL      Glucose 329 mg/dL      Calcium 9 0 mg/dL      AST 24 U/L      ALT 20 U/L      Alkaline Phosphatase 64 U/L      Total Protein 5 7 g/dL      Albumin 3 5 g/dL      Total Bilirubin 0 28 mg/dL      eGFR 52 ml/min/1 73sq m     Narrative:      [place] guidelines for Chronic Kidney Disease (CKD):     Stage 1 with normal or high GFR (GFR > 90 mL/min/1 73 square meters)    Stage 2 Mild CKD (GFR = 60-89 mL/min/1 73 square meters)    Stage 3A Moderate CKD (GFR = 45-59 mL/min/1 73 square meters)    Stage 3B Moderate CKD (GFR = 30-44 mL/min/1 73 square meters)    Stage 4 Severe CKD (GFR = 15-29 mL/min/1 73 square meters)    Stage 5 End Stage CKD (GFR <15 mL/min/1 73 square meters)  Note: GFR calculation is accurate only with a steady state creatinine    Phosphorus [[id number]]  (Abnormal) Collected: 08/22/22 1317    Lab Status: Final result Specimen: Blood from Arm, Right Updated: 08/22/22 1352     Phosphorus 8 7 mg/dL     Magnesium [[id number]]  (Normal) Collected: 08/22/22 1317    Lab Status: Final result Specimen: Blood from Arm, Right Updated: 08/22/22 1352     Magnesium 2 1 mg/dL     Blood culture #2 [[id number]] Collected: 08/22/22 1317    Lab Status:  In process Specimen: Blood from Arm, Right Updated: 08/22/22 1332                 CT head wo contrast   Final Result by Gay Villeda MD (08/22 1500)      Findings suspicious for diffuse cerebral edema concerning for early anoxic encephalopathy               I personally discussed this study with Juanjo Farooq on 8/22/2022 at 3:00 PM                            Workstation performed: [id number]         XR chest 1 view portable   ED Interpretation by Louise Galvan MD (08/22 1411)   ED wet read:  Patient with approximately T2, secondary to the ET tube size unable to the advanced further      CT chest abdomen pelvis w contrast    (Results Pending)              Procedures  ECG 12 Lead Documentation Only    Date/Time: 8/22/2022 3:27 PM  Performed by: Louise Galvan MD  Authorized by: Louise Galvan MD     ECG reviewed by me, the ED Provider: yes    Patient location:  ED  Previous ECG:     Previous ECG:  Unavailable    Comparison to cardiac monitor: Yes    Interpretation:     Interpretation: abnormal    Rate:     ECG rate assessment: normal    Rhythm:     Rhythm: sinus rhythm    Ectopy:     Ectopy: none    QRS:     QRS axis:  Normal    QRS intervals:  Normal  Conduction:     Conduction: normal    ST segments:     ST segments:  Abnormal    Depression:  V2, V3, V4, V5, V6, III, II and aVF  T waves:     T waves: normal    CriticalCare Time  Performed by: Louise Galvan MD  Authorized by: Louise Galvan MD     Critical care provider statement:     Critical care time (minutes):  95    Critical care time was exclusive of:  Separately billable procedures and treating other patients and teaching time    Critical care was necessary to treat or prevent imminent or life-threatening deterioration of the following conditions:  Cardiac failure, circulatory failure, dehydration, shock and respiratory failure    Critical care was time spent personally by me on the following activities:  Blood draw for specimens, obtaining history from patient or surrogate, development of treatment plan with patient or surrogate, discussions with consultants, discussions with primary provider, evaluation of patient's response to treatment, examination of patient, ventilator management, review of old charts, re-evaluation of patient's condition, ordering and performing treatments and interventions, ordering and review of laboratory studies and ordering and review of radiographic studies  Comments:      Cardiac arrest, respiratory failure, post Rosc hypotension             ED Course  ED Course as of 08/22/22 1531   Mon Aug 22, 2022   1402 Ice removed from the patient   1411 Unable to advanced the ET tube further   1411 Third L crystalloid started, levo is downtrending                                             [facility]  Number of Diagnoses or Management Options  Cardiac arrest ([facility] )  Choking, initial encounter  Diagnosis management comments: Patient is a 79-year-old female presents for evaluation of cardiac arrest after a choking episode  Likely respiratory arrest based on history  We did have quick return of spontaneous circulation here  Patient did require vasopressors to maintain adequate blood pressure  Patient to be admitted to the ICU for further supportive measures  Patient did not have any meaningful neurologic activity while in the emergency department        Disposition  Final diagnoses:   Card[facility] arrest ([facility]   Choking, initial encounter     Time reflects when diagnosis was documented in both MDM as applicable and the Disposition within this note     Time User Action Codes Description Comment    8/22/2022  2:20 PM Doe Nogueira [I46 9] Cardiac arrest ([facility] )     8/22/2022  2:23 PM Karlene Repress Modify [I46 9] Cardiac arrest ([facility] )     8/22/2022  2:23 PM [place] Repress Add [[id number]] Choking, initial encounter     8/22/2022  2:35 PM Doe Nogueira [A41 9,  R65 21] Septic shock ([facility] )     8/22/2022  3:14 PM Jean Paul Alston Add [J96 01] Acute respiratory failure with hypoxia [facility])       ED Disposition     ED Disposition   Admit    Condition   Stable    Date/Time   Mon Aug 22, 2022  2:23 PM    Comment   Case was discussed with KELLY and the patient's admission status was agreed to be Admission Status: observation status to the service of Dr Mohsen Lemus   Follow-up Information    None         Current Discharge Medication List      CONTINUE these medications which have NOT CHANGED    Details   alclomethasone (ACLOVATE) 0 05 % cream Apply topically 2 to 3 times a day to affected areas of the ears for up to 2 weeks    Qty: 15 g, Refills: 0    Associated Diagnoses: Lesion of skin of left ear; Chondrodermatitis nodularis helicis of both ears      artificial tear (LUBRIFRESH P M ) 83-15 % ophthalmic ointment daily at bedtime      Aspirin 81 MG CAPS Take 81 mg by mouth daily  Qty: 30 capsule, Refills: 6    Associated Diagnoses: Hypotension due to hypovolemia      atorvastatin (LIPITOR) 10 mg tablet Take 1 tablet (10 mg total) by mouth daily at bedtime Hyperlipidemia  Qty: 28 tablet, Refills: 4    Associated Diagnoses: Hyperlipidemia, unspecified hyperlipidemia type      benztropine (COGENTIN) 1 mg tablet       clonazePAM (KlonoPIN) 1 mg tablet Take 1 mg by mouth 4 (four) times a day      docusate sodium (COLACE) 100 mg capsule Take 1 capsule (100 mg total) by mouth 2 (two) times a day as needed for constipation  Qty: 28 capsule, Refills: 4    Associated Diagnoses: Constipation, unspecified constipation type      ferrous sulfate 324 (65 Fe) mg 1 TAB ORALLY EVERY MORNING BEFORE BREAKFAST DX:ANEMIA  Qty: 28 tablet, Refills: 4    Associated Diagnoses: Iron deficiency anemia secondary to inadequate dietary iron intake      hydrOXYzine HCL (ATARAX) 25 mg tablet Take 1 tablet (25 mg total) by mouth every 6 (six) hours as needed for itching  Refills: 0    Associated Diagnoses: Anxiety      magnesium oxide (MAG-OX) 400 mg tablet Take one tab orally twice daily  Qty: 56 tablet, Refills: 4    Associated Diagnoses: Constipation, unspecified constipation type      melatonin 3 mg Take 6 mg by mouth      Nutritional Supplements (Ensure Active) LIQD Take 0 8 g by mouth      oxybutynin (DITROPAN) 5 mg tablet Take one tab orally twice daily  Qty: 56 tablet, Refills: 4    Associated Diagnoses: Urinary incontinence, unspecified type      pantoprazole (PROTONIX) 40 mg tablet Take 1 tablet (40 mg total) by mouth every morning GERD  Qty: 28 tablet, Refills: 4    Associated Diagnoses: Gastroesophageal reflux disease      risperiDONE (RisperDAL) 1 mg tablet Take 1 mg by mouth      sertraline (ZOLOFT) 100 mg tablet Take 150 mg by mouth 2 (two) times a day       SM Aspirin Adult Low Strength 81 MG EC tablet 1 TAB ORALLY EVERY MORNING DX: STROKE PREVENTION  Qty: 28 tablet, Refills: 4    Associated Diagnoses: Hyperlipidemia, unspecified hyperlipidemia type      tolnaftate (TINACTIN) 1 % cream Apply topically 2 (two) times a day        traZODone (DESYREL) 150 mg tablet Take 150 mg by mouth daily at bedtime       ziprasidone (GEODON) 40 mg capsule Take 80 mg by mouth 2 (two) times a day 20mg in morning 60mg at bedtime              No discharge procedures on file      PDMP Review       Value Time User    PDMP Reviewed  Yes 7/8/2022 12:32 PM Donavon Granados MD          ED Provider  Electronically Signed by           Anne Marie Uribe MD  08/22/22 426 Justice Coppola MD  08/22/22 [id number]

## 2022-08-22 NOTE — PROCEDURES
Intubation    Date/Time: 8/22/2022 4:00 PM  Performed by: KENNETH Tang  Authorized by: KENNETH Tang     Patient location:  Bedside  Other Assisting Provider: Yes (comment) (Dr Cast Persons )    Consent:     Consent obtained:  Emergent situation  Universal protocol:     Relevant documents present and verified: yes      Test results available and properly labeled: yes      Radiology Images displayed and confirmed  If images not available, report reviewed: yes      Required blood products, implants, devices, and special equipment available: yes      Site/side marked: yes      Immediately prior to procedure, a time out was called: yes      Patient identity confirmed: Anonymous protocol, patient vented/unresponsive  Pre-procedure details:     Patient status:  Unresponsive  Indications:     Indications for intubation: respiratory distress, respiratory failure, airway protection and hypoxemia    Procedure details:     CPR in progress: no      Intubation method:  Oral    Tube size (mm):  7 5    Tube type:  Hi-lo    Number of attempts:  1  Placement assessment:     ETT to teeth:  24    Tube secured with:  ETT reeder    Breath sounds:  Equal and absent over the epigastrium    Placement verification: condensation, equal breath sounds and ETCO2 detector    Comments:      6 0 ETT exchanged over bougie for 7 5 ETT without difficulty  Placement verified by bronchoscopy performed by Dr Serene Cody

## 2022-08-22 NOTE — RESPIRATORY THERAPY NOTE
respiratory care   Bedside bronch performed at bedside by Dr Rafi Smith  Used Glidescope  Two samples sent to lab  Pt placed on 100% O2 for procedure  No complications

## 2022-08-22 NOTE — PLAN OF CARE
Problem: NEUROSENSORY - ADULT  Goal: Achieves stable or improved neurological status  Description: INTERVENTIONS  - Monitor and report changes in neurological status  - Monitor vital signs such as temperature, blood pressure, glucose, and any other labs ordered   - Initiate measures to prevent increased intracranial pressure  - Monitor for seizure activity and implement precautions if appropriate      Outcome: Not Progressing

## 2022-08-22 NOTE — ASSESSMENT & PLAN NOTE
· In the setting of cardiac arrest, CT with concern for early anoxic injury  · Continue TTM as above, goal temp 36C  · Frequent neuro exam  · Monitor off sedation for now, can initiate if neuro exam improves

## 2022-08-22 NOTE — INCIDENTAL FINDINGS
The following findings require follow up:  Radiographic finding   Finding: Narrowing of ascending colon with concern for colon malignancy   Follow up required: colonoscopy   Follow up should be done within 1 month(s)    Please notify the following clinician to assist with the follow up:   Dr Abhijit Martin (PCP)

## 2022-08-22 NOTE — ASSESSMENT & PLAN NOTE
· Reported PEA cardiac arrest after patient became unresponsive at nursing home, concern for choking incident and hypoxic respiratory failure as cause for cardiac arrest given large food bolus found on intubation  · Received bystander CPR until EMS arrived, CPR and Epi x3 from EMS, and Epi x2 in ED with eventual ROSC  · Continue levo and vasopressin for HD support, goal MAP>65   · Given poor neuro exam, start TTM, goal 36C  · Scheduled buspar and tylenol with PRN fentanyl for shivering  · Monitor electrolytes and coags closely  · Monitor neuro exam closely

## 2022-08-22 NOTE — PLAN OF CARE
Problem: Prexisting or High Potential for Compromised Skin Integrity  Goal: Skin integrity is maintained or improved  Description: INTERVENTIONS:  - Identify patients at risk for skin breakdown  - Assess and monitor skin integrity  - Assess and monitor nutrition and hydration status  - Monitor labs   - Assess for incontinence   - Turn and reposition patient  - Assist with mobility/ambulation  - Relieve pressure over bony prominences  - Avoid friction and shearing  - Provide appropriate hygiene as needed including keeping skin clean and dry  - Evaluate need for skin moisturizer/barrier cream  - Collaborate with interdisciplinary team   - Patient/family teaching  - Consider wound care consult   Outcome: Progressing     Problem: Potential for Falls  Goal: Patient will remain free of falls  Description: INTERVENTIONS:  - Educate patient/family on patient safety including physical limitations  - Instruct patient to call for assistance with activity   - Consult OT/PT to assist with strengthening/mobility   - Keep Call bell within reach  - Keep bed low and locked with side rails adjusted as appropriate  - Keep care items and personal belongings within reach  - Initiate and maintain comfort rounds  - Make Fall Risk Sign visible to staff  - Offer Toileting every 2 Hours, in advance of need  - Initiate/Maintain fall alarm  - Obtain necessary fall risk management equipment: yellow socks  Problem: PAIN - ADULT  Goal: Verbalizes/displays adequate comfort level or baseline comfort level  Description: Interventions:  - Encourage patient to monitor pain and request assistance  - Assess pain using appropriate pain scale  - Administer analgesics based on type and severity of pain and evaluate response  - Implement non-pharmacological measures as appropriate and evaluate response  - Consider cultural and social influences on pain and pain management  - Notify physician/advanced practitioner if interventions unsuccessful or patient reports new pain  Outcome: Progressing     Problem: INFECTION - ADULT  Goal: Absence or prevention of progression during hospitalization  Description: INTERVENTIONS:  - Assess and monitor for signs and symptoms of infection  - Monitor lab/diagnostic results  - Monitor all insertion sites, i e  indwelling lines, tubes, and drains  - Monitor endotracheal if appropriate and nasal secretions for changes in amount and color  - [place] appropriate cooling/warming therapies per order  - Administer medications as ordered  - Instruct and encourage patient and family to use good hand hygiene technique  - Identify and instruct in appropriate isolation precautions for identified infection/condition  Outcome: Progressing  Goal: Absence of fever/infection during neutropenic period  Description: INTERVENTIONS:  - Monitor WBC    Outcome: Progressing     Problem: Knowledge Deficit  Goal: Patient/family/caregiver demonstrates understanding of disease process, treatment plan, medications, and discharge instructions  Description: Complete learning assessment and assess knowledge base    Interventions:  - Provide teaching at level of understanding  - Provide teaching via preferred learning methods  Outcome: Progressing     Problem: DISCHARGE PLANNING  Goal: Discharge to home or other facility with appropriate resources  Description: INTERVENTIONS:  - Identify barriers to discharge w/patient and caregiver  - Arrange for needed discharge resources and transportation as appropriate  - Identify discharge learning needs (meds, wound care, etc )  - Arrange for interpretive services to assist at discharge as needed  - Refer to Case Management Department for coordinating discharge planning if the patient needs post-hospital services based on physician/advanced practitioner order or complex needs related to functional status, cognitive ability, or social support system  Outcome: Progressing     Problem: RESPIRATORY - ADULT  Goal: Achieves optimal ventilation and oxygenation  Description: INTERVENTIONS:  - Assess for changes in respiratory status  - Assess for changes in mentation and behavior  - Position to facilitate oxygenation and minimize respiratory effort  - Oxygen administered by appropriate delivery if ordered  - Initiate smoking cessation education as indicated  - Encourage broncho-pulmonary hygiene including cough, deep breathe, Incentive Spirometry  - Assess the need for suctioning and aspirate as needed  - Assess and instruct to report SOB or any respiratory difficulty  - Respiratory Therapy support as indicated  Outcome: Progressing     Problem: METABOLIC, FLUID AND ELECTROLYTES - ADULT  Goal: Electrolytes maintained within normal limits  Description: INTERVENTIONS:  - Monitor labs and assess patient for signs and symptoms of electrolyte imbalances  - Administer electrolyte replacement as ordered  - Monitor response to electrolyte replacements, including repeat lab results as appropriate  - Instruct patient on fluid and nutrition as appropriate  Outcome: Progressing  Goal: Fluid balance maintained  Description: INTERVENTIONS:  - Monitor labs   - Monitor I/O and WT  - Instruct patient on fluid and nutrition as appropriate  - Assess for signs & symptoms of volume excess or deficit  Outcome: Progressing  Goal: Glucose maintained within target range  Description: INTERVENTIONS:  - Monitor Blood Glucose as ordered  - Assess for signs and symptoms of hyperglycemia and hypoglycemia  - Administer ordered medications to maintain glucose within target range  - Assess nutritional intake and initiate nutrition service referral as needed  Outcome: Progressing     - Apply yellow socks and bracelet for high fall risk patients  - Consider moving patient to room near nurses station  Outcome: Progressing

## 2022-08-22 NOTE — H&P
[facility] 1960, 60 y o  female MRN: [id number]  Unit/Bed#: ICU 04-01 Encounter: [id number]  Primary Care Provider: Eudora Heimlich, DO   Date and time admitted to hospital: 8/22/2022  1:08 PM    * Cardiac arrest [facility])  Assessment & Plan  · Reported PEA cardiac arrest after patient became unresponsive at nursing home, concern for choking incident and hypoxic respiratory failure as cause for cardiac arrest given large food bolus found on intubation  · Received bystander CPR until EMS arrived, CPR and Epi x3 from EMS, and Epi x2 in ED with eventual ROSC  · Continue levo and vasopressin for HD support, goal MAP>65   · Given poor neuro exam, start TTM, goal 36C  · Scheduled buspar and tylenol with PRN fentanyl for shivering  · Monitor electrolytes and coags closely  · Monitor neuro exam closely     Acute respiratory failure with hypoxia ([address] )  Assessment & Plan  · Concern for choking incident leading to hypoxic respiratory failure and cardiac arrest, now intubated post arrest for airway protection  · Continue ACVC 28/350/40/6, titrate FiO2 for SpO2>88  · Start abx cefepime and vanco with concern for aspiration   · Frequent suctioning for pulmonary hygiene, start vent bundle    · Hold sedation at this time given poor neuro exam     Encephalopathy acute  Assessment & Plan  · In the setting of cardiac arrest, CT with concern for early anoxic injury  · Continue TTM as above, goal temp 36C  · Frequent neuro exam  · Monitor off sedation for now, can initiate if neuro exam improves     Septic shock (HCC)  Assessment & Plan  · Present on arrival to ED, tachycardia, hypothermia, lactic acidosis   · Suspect pulmonary source in the setting of aspiration  · Will r/o UTI with UA and intra abdominal source with CT AP   · Start BS abx cefepime, vanc  · Follow up on BC, send sputum culture, MRSA   · Monitor WBC, fever curve, procal    Lactic acidosis  Assessment & Plan  · Multifactorial in the setting of cardiac arrest and sepsis  · Ongoing IVF resuscitation  · Follow up on CT CAP   · Trend until clear     JYOTI (acute kidney injury) ([address] )  Assessment & Plan  · Likely in the setting of cardiac arrest  · Baseline Cr around 0 82  · Continue IVF resuscitation   · Monitor I/O and renal indices     Hyperglycemia  Assessment & Plan  · Without hx DM, likely in the setting of cardiac arrest  · Will start insulin infusion, goal glucose 140-180    Early onset Alzheimer's dementia without behavioral disturbance (HCC)  Assessment & Plan  · Hold home medications, avoid sedating medications     Chronic obstructive pulmonary disease (HCC)  Assessment & Plan  · Documented in history, without acute exacerbation  · Not on and inhaled OP regimen    Schizoaffective disorder ([address] )  Assessment & Plan  · Hold home medications and sedating medications for now       -------------------------------------------------------------------------------------------------------------  Chief Complaint: cardiac arrest    History of Present Illness   HX and PE limited by: unresponsive patient   Gayathri Dawn is a 60 y o  female who presents with PEA cardiac arrest  She has PMH dementia, schizophrenia, and COPD  Per chart review, patient was eating lunch at her assisted living facility and began to feel unwell, she then became unresponsive and CPR was started by staff  EMS arrived and she received Epi x3 and was intubated in the field  She was brought to the ED where she received Epi x2 with ROSC  She was immediately started on levophed and vasopressin for hypotension  Post arrest, she remained unresponsive to painful stimuli with no cough/gag reflexes  She will be admitted to ICU for for further monitoring and management  History obtained from chart review  -------------------------------------------------------------------------------------------------------------  Dispo:  Admit to Critical Care     Code Status: Level 1 - Full Code  --------------------------------------------------------------------------------------------------------------  Review of Systems    Review of systems was unable to be performed secondary to intubation     Physical Exam  Constitutional:       General: She is not in acute distress  Appearance: She is ill-appearing  Interventions: She is intubated  HENT:      Head: Normocephalic and atraumatic  Mouth/Throat:      Mouth: Mucous membranes are moist    Eyes:      Comments: Fixed and dilated    Cardiovascular:      Rate and Rhythm: Normal rate and regular rhythm  Pulses: Normal pulses  Heart sounds: Normal heart sounds  No murmur heard  No friction rub  No gallop  Pulmonary:      Effort: Pulmonary effort is normal  No respiratory distress  She is intubated  Breath sounds: No wheezing, rhonchi or rales  Comments: Diminished   Abdominal:      General: There is no distension  Palpations: Abdomen is soft  Tenderness: There is no abdominal tenderness  Musculoskeletal:         General: No swelling  Skin:     General: Skin is warm and dry  Capillary Refill: Capillary refill takes less than 2 seconds  Coloration: Skin is pale  Neurological:      Comments: Pupils fixed and dilated, no cough or gag, unresponsive to painful stimuli in all extremities  --------------------------------------------------------------------------------------------------------------  Vitals:   Vitals:    08/22/22 1515 08/22/22 1530 08/22/22 1545 08/22/22 1600   BP: 155/82 155/94 (!) 192/90 (!) 200/95   Pulse: 65 66 63 66   Resp: (!) 28 (!) 31 18 (!) 28   Temp:       TempSrc:       SpO2:  99% 99% 99%     Temp  Min: 91 4 °F (33 °C)  Max: 94 64 °F (34 8 °C)        There is no height or weight on file to calculate BMI      Laboratory and Diagnostics:  Results from last 7 days   Lab Units 08/22/22  1317   WBC Thousand/uL 9 34   HEMOGLOBIN g/dL 12 4 HEMATOCRIT % 43 4   PLATELETS Thousands/uL 245   BANDS PCT % 2   MONO PCT % 5     Results from last 7 days   Lab Units 08/22/22  1317   SODIUM mmol/L 137   POTASSIUM mmol/L 4 2   CHLORIDE mmol/L 101   CO2 mmol/L 15*   ANION GAP mmol/L 21*   BUN mg/dL 8   CREATININE mg/dL 1 11   CALCIUM mg/dL 9 0   GLUCOSE RANDOM mg/dL 329*   ALT U/L 20   AST U/L 24   ALK PHOS U/L 64   ALBUMIN g/dL 3 5   TOTAL BILIRUBIN mg/dL 0 28     Results from last 7 days   Lab Units 08/22/22  1317   MAGNESIUM mg/dL 2 1   PHOSPHORUS mg/dL 8 7*      Results from last 7 days   Lab Units 08/22/22  1317   INR  1 46*          Results from last 7 days   Lab Units 08/22/22  1505 08/22/22  1317   LACTIC ACID mmol/L 6 0* 13 2*     ABG:  Results from last 7 days   Lab Units 08/22/22  1343   PH ART  7 059*   PCO2 ART mm Hg 45 2*   PO2 ART mm Hg 572 0*   HCO3 ART mmol/L 12 5*   BASE EXC ART mmol/L -17 4   ABG SOURCE  Line, Arterial     VBG:  Results from last 7 days   Lab Units 08/22/22  1343   ABG SOURCE  Line, Arterial     Results from last 7 days   Lab Units 08/22/22  1317   PROCALCITONIN ng/ml <0 05       Micro:        EKG: NSR rate 70  Imaging: I have personally reviewed pertinent reports     and I have personally reviewed pertinent films in PACS      Historical Information   Past Medical History:   Diagnosis Date    Abnormal weight loss     Anxiety     Benzodiazepine abuse ([address] )     Depression     Facial cellulitis     Malaise     Nicotine dependence     Psychiatric disorder     Suicidal ideation      Past Surgical History:   Procedure Laterality Date    HIP SURGERY Right      Social History   Social History     Substance and Sexual Activity   Alcohol Use No     Social History     Substance and Sexual Activity   Drug Use No     Social History     Tobacco Use   Smoking Status Current Every Day Smoker    Packs/day: 1 00    Years: 20 00    Pack years: 20 00    Start date: 1977   Smokeless Tobacco Never Used   Tobacco Comment    current every day smoker per [employer]     Family History:   Family History   Problem Relation Age of Onset    Diabetes Mother     Stroke Mother         Syndrome    Heart attack Father     Other Brother         Intellectual Disabilities    No Known Problems Sister     No Known Problems Maternal Grandmother     Breast cancer Paternal Grandmother 68    Breast cancer Maternal Aunt 39    No Known Problems Maternal Aunt     No Known Problems Maternal Aunt     Colon cancer Neg Hx     Ovarian cancer Neg Hx     Cervical cancer Neg Hx     Uterine cancer Neg Hx          Medications:  Current Facility-Administered Medications   Medication Dose Route Frequency    acetaminophen (TYLENOL) tablet 975 mg  975 mg Oral Q6H    busPIRone (BUSPAR) tablet 15 mg  15 mg Oral Q8H    cefepime (MAXIPIME) IVPB (premix in dextrose) 2,000 mg 50 mL  2,000 mg Intravenous Q12H    chlorhexidine (PERIDEX) 0 12 % oral rinse 15 mL  15 mL Mouth/Throat Q12H [facility]    enoxaparin (LOVENOX) subcutaneous injection 40 mg  40 mg Subcutaneous Daily    fentanyl citrate (PF) 100 MCG/2ML 50 mcg  50 mcg Intravenous Q2H PRN    insulin regular (HumuLIN R,NovoLIN R) 1 Units/mL in sodium chloride 0 9 % 100 mL infusion  0 3-21 Units/hr Intravenous Titrated    multi-electrolyte (ISOLYTE-S PH 7 4) bolus 1,000 mL  1,000 mL Intravenous Once    multi-electrolyte (PLASMALYTE-A/ISOLYTE-S PH 7 4) IV solution  125 mL/hr Intravenous Continuous    NOREPINEPHRINE 4 MG  ML NSS (CMPD ORDER) infusion  1-30 mcg/min Intravenous Titrated    pantoprazole (PROTONIX) injection 40 mg  40 mg Intravenous Q24H HARINDER    sodium chloride 0 9 % bolus 1,000 mL  1,000 mL Intravenous Once    sodium chloride 0 9 % bolus 1,000 mL  1,000 mL Intravenous Once    sodium chloride 0 9 % bolus 1,000 mL  1,000 mL Intravenous Once    vancomycin (VANCOCIN) IVPB (premix in dextrose) 1,000 mg 200 mL  20 mg/kg (Adjusted) Intravenous Q24H    vasopressin (PITRESSIN) 20 Units in sodium chloride 0 9 % 100 mL infusion  0 03 Units/min Intravenous Continuous     Home medications:  Prior to Admission Medications   Prescriptions Last Dose Informant Patient Reported? Taking? Aspirin 81 MG CAPS   No No   Sig: Take 81 mg by mouth daily   Nutritional Supplements (Ensure Active) LIQD   Yes No   Sig: Take 0 8 g by mouth   SM Aspirin Adult Low Strength 81 MG EC tablet  Outside Facility (Specify) No No   Si TAB ORALLY EVERY MORNING DX: STROKE PREVENTION   alclomethasone (ACLOVATE) 0 05 % cream   No No   Sig: Apply topically 2 to 3 times a day to affected areas of the ears for up to 2 weeks     Patient not taking: No sig reported   artificial tear (LUBRIFRESH P M ) 83-15 % ophthalmic ointment  Outside Facility (Specify) Yes No   Sig: daily at bedtime   atorvastatin (LIPITOR) 10 mg tablet   No No   Sig: Take 1 tablet (10 mg total) by mouth daily at bedtime Hyperlipidemia   benztropine (COGENTIN) 1 mg tablet  Outside Facility (Specify) Yes No   clonazePAM (KlonoPIN) 1 mg tablet  Outside Facility (Specify) Yes No   Sig: Take 1 mg by mouth 4 (four) times a day   docusate sodium (COLACE) 100 mg capsule   No No   Sig: Take 1 capsule (100 mg total) by mouth 2 (two) times a day as needed for constipation   ferrous sulfate 324 (65 Fe) mg   No No   Si TAB ORALLY EVERY MORNING BEFORE BREAKFAST DX:ANEMIA   hydrOXYzine HCL (ATARAX) 25 mg tablet  Outside Facility (Specify) No No   Sig: Take 1 tablet (25 mg total) by mouth every 6 (six) hours as needed for itching   magnesium oxide (MAG-OX) 400 mg tablet   No No   Sig: Take one tab orally twice daily   melatonin 3 mg  Outside Facility (Specify) Yes No   Sig: Take 6 mg by mouth   oxybutynin (DITROPAN) 5 mg tablet   No No   Sig: Take one tab orally twice daily   pantoprazole (PROTONIX) 40 mg tablet   No No   Sig: Take 1 tablet (40 mg total) by mouth every morning GERD   risperiDONE (RisperDAL) 1 mg tablet   Yes No   Sig: Take 1 mg by mouth   sertraline (ZOLOFT) 100 mg tablet Outside Facility (Specify) Yes No   Sig: Take 150 mg by mouth 2 (two) times a day    tolnaftate (TINACTIN) 1 % cream  Outside Facility (Specify) Yes No   Sig: Apply topically 2 (two) times a day     Patient not taking: Reported on 7/2/2022   traZODone (DESYREL) 150 mg tablet  Outside Facility (Specify) Yes No   Sig: Take 150 mg by mouth daily at bedtime    ziprasidone (GEODON) 40 mg capsule  Outside Facility (Specify) Yes No   Sig: Take 80 mg by mouth 2 (two) times a day 20mg in morning 60mg at bedtime    Patient not taking: Reported on 7/2/2022      Facility-Administered Medications: None     Allergies: Allergies   Allergen Reactions    Quetiapine     Penicillins Rash       ------------------------------------------------------------------------------------------------------------  Advance Directive and Living Will:      Power of :    POLST:    ------------------------------------------------------------------------------------------------------------  Anticipated Length of Stay is > 2 midnights    Care Time Delivered:   Upon my evaluation, this patient had a high probability of imminent or life-threatening deterioration due to shock, cardiac arrest, resp failure, which required my direct attention, intervention, and personal management  I have personally provided 50 minutes (1430 to 1520) of critical care time, exclusive of procedures, teaching, family meetings, and any prior time recorded by providers other than myself  KENNETH Rodríguez        Portions of the record may have been created with voice recognition software  Occasional wrong word or "sound a like" substitutions may have occurred due to the inherent limitations of voice recognition software    Read the chart carefully and recognize, using context, where substitutions have occurred

## 2022-08-22 NOTE — ASSESSMENT & PLAN NOTE
· Without hx DM, likely in the setting of cardiac arrest  · Will start insulin infusion, goal glucose 140-180

## 2022-08-23 PROBLEM — E87.20 LACTIC ACIDOSIS: Status: RESOLVED | Noted: 2022-01-01 | Resolved: 2022-01-01

## 2022-08-23 PROBLEM — N17.9 AKI (ACUTE KIDNEY INJURY) (HCC): Status: RESOLVED | Noted: 2022-01-01 | Resolved: 2022-01-01

## 2022-08-23 PROBLEM — E87.2 LACTIC ACIDOSIS: Status: RESOLVED | Noted: 2022-01-01 | Resolved: 2022-01-01

## 2022-08-23 NOTE — ASSESSMENT & PLAN NOTE
· Likely in the setting of cardiac arrest  · Baseline Cr around 0 8    Plan:  · Continue IVF resuscitation   · Monitor I/O and renal indices

## 2022-08-23 NOTE — RESPIRATORY THERAPY NOTE
RT Ventilator Management Note  Edgar Brandon 60 y o  female MRN: [id number]  Unit/Bed#: ICU 04-01 Encounter: [id number]      Break-the-Glass       Encounters that might contain data have been deemed confidential and restricted               Daily Screen         8/23/2022  0440             Patient safety screen outcome[de-identified] Failed (P)     Not Ready for Weaning due to[de-identified] Underline problem not resolved (P)               Physical Exam:   Assessment Type: Assess only  General Appearance: Sedated  Respiratory Pattern: Assisted  Chest Assessment: Chest expansion symmetrical  Bilateral Breath Sounds: Diminished  Cough: None      Resp Comments: (P) pt switched to spont mode this shift and tolerating well, no other changes made

## 2022-08-23 NOTE — ASSESSMENT & PLAN NOTE
· Concern for choking incident leading to hypoxic respiratory failure and cardiac arrest, now intubated post arrest for airway protection    Plan:  · Continue PSV and titrate FiO2 for SpO2 >88%  · Continue cefepime and vanco with concern for aspiration   · Awaiting cultures- narrow ABX when able  · Frequent suctioning for pulmonary hygiene, VAP bundle    · Propofol for sedation given respiratory alkalosis and large TVs

## 2022-08-23 NOTE — PROGRESS NOTES
[facility] [address]  Progress Note Aurora Thayer 1960, 60 y o  female MRN: [id number]  Unit/Bed#: ICU 04-01 Encounter: [id number]  Primary Care Provider: Willow Tinoco DO   Date and time admitted to hospital: 8/22/2022  1:08 PM    * Cardiac arrest [facility])  Assessment & Plan  · Reported PEA cardiac arrest after patient became unresponsive at nursing home, concern for choking incident and hypoxic respiratory failure as cause for cardiac arrest given large food bolus found on intubation  · Received bystander CPR until EMS arrived, CPR and Epi x3 from EMS, and Epi x2 in ED with eventual ROSC    Plan:  · Pressors weaned to off, restart as needed for goal MAP >65   · Given poor neuro exam, start TTM, goal 36C- will rewarm to 37C today at 1400  · Scheduled buspar and tylenol with PRN fentanyl for shivering  · Monitor electrolytes and coags closely  · Monitor neuro exam closely     Acute respiratory failure with hypoxia (HCC)  Assessment & Plan  · Concern for choking incident leading to hypoxic respiratory failure and cardiac arrest, now intubated post arrest for airway protection    Plan:  · Continue PSV and titrate FiO2 for SpO2 >88%  · Continue cefepime and vanco with concern for aspiration   · Awaiting cultures- narrow ABX when able  · Frequent suctioning for pulmonary hygiene, VAP bundle    · Propofol for sedation given respiratory alkalosis and large TVs    Encephalopathy acute  Assessment & Plan  · In the setting of cardiac arrest, CT with concern for early anoxic injury  · 8/22 CTH - Findings suspicious for diffuse cerebral edema concerning for early anoxic encephalopathy       Plan:   · Continue TTM as above, goal temp 36C  · Frequent neuro exams  · Guarded prognosis- GOL notified    Septic shock ([facility] [address] )  Assessment & Plan  · Present on arrival to ED, tachycardia, hypothermia, lactic acidosis   · Suspect pulmonary source in the setting of aspiration  · 8/22 CT CAP - Nondisplaced fractures right 3rd, 4th, and 5th ribs  Nondisplaced fracture of the sternum without significant retrosternal hematoma   Likely from CPR  Endotracheal tube in good position above alvaro  Small volumes of aspirated material in the bilateral lower lobe branch bronchioles  Scattered scarring and emphysematous changes  No pleural effusion or pneumothorax  Known liver hemangioma   Diffuse periportal edema likely related to fluid resuscitation   Small amounts of gas in the portal triad of the medial segment left hepatic lobe obscured by motion artifact is most likely pneumobilia based on the distribution  No other mesenteric or portal venous gas is found   No bowel wall pneumatosis or other evidence for bowel infarct   Questionable short segment small bowel-small bowel intussusception near the ligament of Treitz, of no clinical relevance  Cholelithiasis without CT evidence of cholecystitis  NG tube tip is at the GE junction and should be advanced into the stomach  Distention of the cecum with fluid and partially formed stool, apparently the result of a palpable core lesion in the ascending colon as detailed above concerning for colonic malignancy   Colonoscopy advised when/if feasible  No complete bowel obstruction or perforation  Right femoral line tip in the right external iliac vein  Mildly distended urinary bladder with Le catheter in place       Plan:  · Continue cefepime and vancomycin  · Follow up on BC, sputum culture, MRSA   · Monitor WBC, fever curve, procal    JYOTI (acute kidney injury) ([facility] [address] )  Assessment & Plan  · Likely in the setting of cardiac arrest  · Baseline Cr around 0 8    Plan:  · Continue IVF resuscitation   · Monitor I/O and renal indices     Lactic acidosis  Assessment & Plan  · Multifactorial in the setting of cardiac arrest and sepsis  · Resolved after resuscitation     Abnormal CT of the abdomen  Assessment & Plan  · Narrowing of ascending colon concerning for malignancy  · Will require OP colonoscopy    Sternal fracture  Assessment & Plan  · Pain management and supportive care    Fracture of multiple ribs  Assessment & Plan  · Pain management and supportive care    Hyperglycemia  Assessment & Plan  · Without hx DM, likely in the setting of cardiac arrest  · Insulin infusion  · HgbA1C pending    Severe protein-calorie malnutrition (HCC)  Assessment & Plan  Body mass index is 20 93 kg/m²  · Consider starting TF this afternoon    Schizoaffective disorder ([facility] [address] )  Assessment & Plan  · Hold home medications for now     Chronic obstructive pulmonary disease ([facility] [address] )  Assessment & Plan  · Documented in history, without acute exacerbation  · Not on and inhaled OP regimen  · No hypoxia    Early onset Alzheimer's dementia without behavioral disturbance ([facility] [address] )  Assessment & Plan  · Hold home medications    ----------------------------------------------------------------------------------------  HPI/24hr events: Levophed and vasopressin weaned off  Patient with continued poor neuro exam  Large TVs resulting in respiratory alkalosis  Lactic cleared  UOP improved       Patient appropriate for transfer out of the ICU today?: No  Disposition: Continue Critical Care   Code Status: Level 1 - Full Code  ---------------------------------------------------------------------------------------  SUBJECTIVE  n/a    Review of Systems   Unable to perform ROS: Intubated     ---------------------------------------------------------------------------------------  OBJECTIVE    Vitals   Vitals:    22 0300 22 0400 22 0456 22 0500   BP: 128/62 120/64  155/70   BP Location: Left arm Left arm  Left arm   Pulse: (!) 45 (!) 46  (!) 46   Resp: (!) 23 (!) 25  (!) 27   Temp: (!) 96 3 °F (35 7 °C) 97 6 °F (36 4 °C)  (!) 96 5 °F (35 8 °C)   TempSrc: Bladder Bladder  Bladder   SpO2: 100% 100%  98%   Weight:   54 kg (119 lb 0 8 oz)    Height:         Temp (24hrs), Av 2 °F (35 1 °C), Min:91 4 °F (33 °C), Max:97 6 °F (36 4 °C)  Current: Temperature: (!) 96 5 °F (35 8 °C)     Arterial Line BP: 161/65  Arterial Line MAP (mmHg): 98 mmHg    Respiratory:  SpO2: SpO2: 98 %, SpO2 Activity: SpO2 Activity: At Rest, SpO2 Device: O2 Device: Ventilator  6/6 40%    Invasive/non-invasive ventilation settings   Respiratory  Report   Lab Data (Last 4 hours)      08/23 0442            pH, Arterial       7 537             pCO2, Arterial       23 4             pO2, Arterial       206 3             HCO3, Arterial       19 4             Base Excess, Arterial       -1 3                  O2/Vent Data     None              Physical Exam  Vitals reviewed  Constitutional:       Appearance: She is cachectic  She is ill-appearing  Interventions: She is sedated and intubated  Eyes:      Pupils:      Right eye: Pupil is not reactive  Left eye: Pupil is not reactive  Cardiovascular:      Rate and Rhythm: Regular rhythm  Bradycardia present  Heart sounds: No murmur heard  No friction rub  Pulmonary:      Effort: Pulmonary effort is normal  She is intubated  Breath sounds: Rhonchi present  No wheezing or rales  Genitourinary:     Comments: +Le  Skin:     General: Skin is warm and dry  Neurological:      Comments: Does not follow commands  No gag or corenal reflex            Laboratory and Diagnostics:  Results from last 7 days   Lab Units 08/23/22  0441 08/22/22  1317   WBC Thousand/uL 19 08* 9 34   HEMOGLOBIN g/dL 12 9 12 4   HEMATOCRIT % 39 1 43 4   PLATELETS Thousands/uL 248 245   BANDS PCT %  --  2   MONO PCT %  --  5     Results from last 7 days   Lab Units 08/23/22  0441 08/22/22  2149 08/22/22  1621 08/22/22  1317   SODIUM mmol/L 139 139 138 137   POTASSIUM mmol/L 4 0 2 8* 4 2 4 2   CHLORIDE mmol/L 110* 108 104 101   CO2 mmol/L 17* 19* 18* 15*   ANION GAP mmol/L 12 12 16* 21*   BUN mg/dL 11 11 9 8   CREATININE mg/dL 0 61 0 62 0 83 1 11   CALCIUM mg/dL 8 0* 7 2* 7 5* 9 0   GLUCOSE RANDOM mg/dL 113 111 285* 329*   ALT U/L 44  --   --  20   AST U/L 76*  --   --  24   ALK PHOS U/L 76  --   --  64   ALBUMIN g/dL 3 3*  --   --  3 5   TOTAL BILIRUBIN mg/dL 0 40  --   --  0 28     Results from last 7 days   Lab Units 08/23/22  0441 08/22/22  1621 08/22/22  1317   MAGNESIUM mg/dL 2 1 2 3 2 1   PHOSPHORUS mg/dL 1 3* 4 1 8 7*      Results from last 7 days   Lab Units 08/23/22  0441 08/22/22  1317   INR  1 29* 1 46*   PTT seconds 30  --           Results from last 7 days   Lab Units 08/22/22  2149 08/22/22  1505 08/22/22  1317   LACTIC ACID mmol/L 1 1 6 0* 13 2*     ABG:  Results from last 7 days   Lab Units 08/23/22  0442   PH ART  7 537*   PCO2 ART mm Hg 23 4*   PO2 ART mm Hg 206 3*   HCO3 ART mmol/L 19 4*   BASE EXC ART mmol/L -1 3   ABG SOURCE  Line, Arterial     VBG:  Results from last 7 days   Lab Units 08/23/22  0442   ABG SOURCE  Line, Arterial     Results from last 7 days   Lab Units 08/22/22  1317   PROCALCITONIN ng/ml <0 05       Micro  Results from last 7 days   Lab Units 08/22/22  1403 08/22/22  1317   BLOOD CULTURE  Received in Microbiology Lab  Culture in Progress  Received in Microbiology Lab  Culture in Progress  Intake and Output  I/O       08/21 0701  08/22 0700 08/22 0701  08/23 0700    I V  (mL/kg)  4199 2 (80 9)    IV Piggyback  4410    Total Intake(mL/kg)  8609 2 (165 9)    Urine (mL/kg/hr)  2895    Emesis/NG output  300    Total Output  3195    Net  +5414 2              Height and Weights   Height: 5' 2" (157 5 cm)     Body mass index is 21 77 kg/m²  Weight (last 2 days)     Date/Time Weight    08/23/22 0456 54 (119 05)    08/22/22 1900 51 9 (114 42)        Nutrition       Diet Orders   (From admission, onward)             Start     Ordered    08/22/22 1422  Diet NPO  Diet effective now        References:    Nutrtion Support Algorithm Enteral vs  Parenteral   Question Answer Comment   Diet Type NPO    RD to adjust diet per protocol?  Yes        08/22/22 1420              Active Medications  Scheduled Meds:  Current Facility-Administered Medications   Medication Dose Route Frequency Provider Last Rate    acetaminophen  975 mg Oral Q6H KENNETH Johnson      busPIRone  15 mg Oral Q8H Yudyshashi Mohr, SUSANNP      calcium gluconate  2 g Intravenous Once Juliana Goode PA-C      cefepime  2,000 mg Intravenous Q12H Yudyshashi Mohr, CRNP Stopped (08/23/22 0300)    chlorhexidine  15 mL Mouth/Throat Q12H [address] YudySUSAN MajanoNP      enoxaparin  30 mg Subcutaneous Q24H [address] Juliana Goode PA-C      fentanyl citrate (PF)  50 mcg Intravenous Q2H PRN Anjel Helms, CRNP      insulin regular (HumuLIN R,NovoLIN R) infusion  0 3-21 Units/hr Intravenous Titrated Anjel Helms, CRNP 0 5 Units/hr (08/22/22 2058)    multi-electrolyte  125 mL/hr Intravenous Continuous Yudy NEENA Mohr CRNP 125 mL/hr (08/22/22 1626)    norepinephrine  1-30 mcg/min Intravenous Titrated Yudy L Ryanbert, CRNP Stopped (08/22/22 1530)    pantoprazole  40 mg Intravenous Q24H [address] SUSAN JohnsonNP      potassium phosphate  30 mmol Intravenous Once Juliana Goode PA-C      propofol  5-50 mcg/kg/min Intravenous Titrated Anjel Helms, CRNP 10 mcg/kg/min (08/23/22 0510)    sodium chloride  1,000 mL Intravenous Once Yudyshashi Mohr, CRNP Stopped (08/22/22 1348)    sodium chloride  1,000 mL Intravenous Once Yudyluly Mohr, CRNP Stopped (08/22/22 1348)    vancomycin  20 mg/kg (Adjusted) Intravenous Q24H Yudy L Ryanbert, CRNP       Continuous Infusions:  insulin regular (HumuLIN R,NovoLIN R) infusion, 0 3-21 Units/hr, Last Rate: 0 5 Units/hr (08/22/22 2058)  multi-electrolyte, 125 mL/hr, Last Rate: 125 mL/hr (08/22/22 1626)  norepinephrine, 1-30 mcg/min, Last Rate: Stopped (08/22/22 1530)  propofol, 5-50 mcg/kg/min, Last Rate: 10 mcg/kg/min (08/23/22 7255)      PRN Meds:   fentanyl citrate (PF), 50 mcg, Q2H PRN      Invasive Devices Review  Invasive Devices  Report    Central Venous Catheter Line  Duration           CVC Central Lines 08/22/22 Triple <1 day          Peripheral Intravenous Line  Duration           Peripheral IV 08/22/22 Left Antecubital <1 day    Peripheral IV 08/22/22 Right Antecubital <1 day          Arterial Line  Duration           Arterial Line 08/22/22 Right Radial <1 day          Drain  Duration           NG/OG/Enteral Tube Orogastric 14 Fr Right mouth 1 day    Urethral Catheter Temperature probe 16 Fr  1 day          Airway  Duration           ETT  Cuffed; Inflated; Hi-Lo; Oral 7 5 mm <1 day              Rationale for remaining devices: Consider replacing femoral TLC for RIJ  ---------------------------------------------------------------------------------------  Care Time Delivered:   Upon my evaluation, this patient had a high probability of imminent or life-threatening deterioration due to cardiac arrest, TTM, respiratory failure, which required my direct attention, intervention, and personal management  I have personally provided 39 minutes (0100 to [phone]) of critical care time, exclusive of procedures, teaching, family meetings, and any prior time recorded by providers other than myself  Nerissa Addison PA-C    Portions of the record may have been created with voice recognition software  Occasional wrong word or "sound a like" substitutions may have occurred due to the inherent limitations of voice recognition software    Read the chart carefully and recognize, using context, where substitutions have occurred

## 2022-08-23 NOTE — NURSING NOTE
[id number] Received report from nightshift RN  RN assessment performed and documented in flowsheets  See MAR for medication administration  Pt vented and receiving arctic sun, bradycardic on monitor to low to mid 40s  Pupils fixed and non-reactive  Pt does not respond to painful stimuli  Le patent and draining clear urine  Maintaining BP without pressor support  0820 Discussed pt's bradycardia with Aristides, Epi gtt to be started  1400 Rewarming initiated  1600 Pt tolerating rewarming  No additional interventions required

## 2022-08-23 NOTE — ASSESSMENT & PLAN NOTE
· Present on arrival to ED, tachycardia, hypothermia, lactic acidosis   · Suspect pulmonary source in the setting of aspiration  · 8/22 CT CAP - Nondisplaced fractures right 3rd, 4th, and 5th ribs  Nondisplaced fracture of the sternum without significant retrosternal hematoma   Likely from CPR  Endotracheal tube in good position above alvaro  Small volumes of aspirated material in the bilateral lower lobe branch bronchioles  Scattered scarring and emphysematous changes  No pleural effusion or pneumothorax  Known liver hemangioma   Diffuse periportal edema likely related to fluid resuscitation   Small amounts of gas in the portal triad of the medial segment left hepatic lobe obscured by motion artifact is most likely pneumobilia based on the distribution  No other mesenteric or portal venous gas is found   No bowel wall pneumatosis or other evidence for bowel infarct   Questionable short segment small bowel-small bowel intussusception near the ligament of Treitz, of no clinical relevance  Cholelithiasis without CT evidence of cholecystitis  NG tube tip is at the GE junction and should be advanced into the stomach  Distention of the cecum with fluid and partially formed stool, apparently the result of a palpable core lesion in the ascending colon as detailed above concerning for colonic malignancy   Colonoscopy advised when/if feasible  No complete bowel obstruction or perforation  Right femoral line tip in the right external iliac vein  Mildly distended urinary bladder with Le catheter in place       Plan:  · Continue cefepime and vancomycin  · Follow up on BC, sputum culture, MRSA   · Monitor WBC, fever curve, procal

## 2022-08-23 NOTE — PROGRESS NOTES
Vancomycin Assessment    Manual Forest is a 60 y o  female who is currently receiving vancomycin 1g q24 for Pneumonia     Relevant clinical data and objective history reviewed:  Creatinine   Date Value Ref Range Status   08/23/2022 0 61 0 60 - 1 30 mg/dL Final     Comment:     Standardized to IDMS reference method   08/22/2022 0 62 0 60 - 1 30 mg/dL Final     Comment:     Standardized to IDMS reference method   08/22/2022 0 83 0 60 - 1 30 mg/dL Final     Comment:     Standardized to IDMS reference method   12/22/2015 0 78 0 60 - 1 30 mg/dL Final     Comment:     Standardized to IDMS reference method   08/27/2015 0 6 0 6 - 1 0 mg/dL Final     Comment:     Standardized to IDMS reference method   04/09/2015 0 6 0 6 - 1 0 mg/dL Final     Comment:     Standardized to IDMS reference method     /77   Pulse 55   Temp (!) 96 62 °F (35 9 °C)   Resp 19   Ht 5' 2" (1 575 m)   Wt 54 kg (119 lb)   SpO2 100%   BMI 21 77 kg/m²   I/O last 3 completed shifts: In: 9563 1 [I V :5003 1; IV Piggyback:4560]  Out: 8036 [Urine:3450; Emesis/NG output:300]  Lab Results   Component Value Date/Time    BUN 11 08/23/2022 04:41 AM    BUN 17 12/22/2015 02:18 PM    WBC 19 08 (H) 08/23/2022 04:41 AM    WBC 8 98 12/22/2015 02:18 PM    HGB 12 9 08/23/2022 04:41 AM    HGB 13 1 12/22/2015 02:18 PM    HCT 39 1 08/23/2022 04:41 AM    HCT 39 7 12/22/2015 02:18 PM    MCV 86 08/23/2022 04:41 AM    MCV 84 12/22/2015 02:18 PM     08/23/2022 04:41 AM     12/22/2015 02:18 PM     Temp Readings from Last 3 Encounters:   08/23/22 (!) 96 62 °F (35 9 °C)   07/08/22 (!) 97 3 °F (36 3 °C)   02/22/22 (!) 97 2 °F (36 2 °C) (Temporal)     Vancomycin Days of Therapy: 1    Assessment/Plan  The patient is currently on vancomycin utilizing scheduled dosing    The patient is receiving 1g q24 but did not receive the first dose of vanco yesterday based on a goal of 15-20 (appropriate for most indications) ; after clinical evaluation will schedule patient to receive a 1 gram load stat followed by 750 mg q12   Pharmacy will continue to follow closely for s/sx of nephrotoxicity, infusion reactions, and appropriateness of therapy  BMP and CBC will be ordered per protocol  Plan for trough as patient approaches steady state, prior to the 4th  dose at approximately 2230 8/24  Pharmacy will continue to follow the patients culture results and clinical progress daily      Ismael Liang, Pharmacist

## 2022-08-23 NOTE — ASSESSMENT & PLAN NOTE
· Reported PEA cardiac arrest after patient became unresponsive at nursing home, concern for choking incident and hypoxic respiratory failure as cause for cardiac arrest given large food bolus found on intubation  · Received bystander CPR until EMS arrived, CPR and Epi x3 from EMS, and Epi x2 in ED with eventual ROSC    Plan:  · Pressors weaned to off, restart as needed for goal MAP >65   · Given poor neuro exam, start TTM, goal 36C- will rewarm to 37C today at 1400  · Scheduled buspar and tylenol with PRN fentanyl for shivering  · Monitor electrolytes and coags closely  · Monitor neuro exam closely

## 2022-08-23 NOTE — ASSESSMENT & PLAN NOTE
· In the setting of cardiac arrest, CT with concern for early anoxic injury  · 8/22 CTH - Findings suspicious for diffuse cerebral edema concerning for early anoxic encephalopathy       Plan:   · Continue TTM as above, goal temp 36C  · Frequent neuro exams  · Guarded prognosis- GOL notified

## 2022-08-23 NOTE — PLAN OF CARE
Pt post code, on artic sun    Problem: NEUROSENSORY - ADULT  Goal: Achieves stable or improved neurological status  Description: INTERVENTIONS  - Monitor and report changes in neurological status  - Monitor vital signs such as temperature, blood pressure, glucose, and any other labs ordered   - Initiate measures to prevent increased intracranial pressure  - Monitor for seizure activity and implement precautions if appropriate      Outcome: Not Progressing     Problem: METABOLIC, FLUID AND ELECTROLYTES - ADULT  Goal: Electrolytes maintained within normal limits  Description: INTERVENTIONS:  - Monitor labs and assess patient for signs and symptoms of electrolyte imbalances  - Administer electrolyte replacement as ordered  - Monitor response to electrolyte replacements, including repeat lab results as appropriate  - Instruct patient on fluid and nutrition as appropriate  Outcome: Progressing

## 2022-08-23 NOTE — RESPIRATORY THERAPY NOTE
08/23/22 0719   Respiratory Assessment   Assessment Type Assess only   General Appearance Sedated   Respiratory Pattern Spontaneous;Assisted   Chest Assessment Chest expansion symmetrical   Bilateral Breath Sounds Diminished; Other (Comment)  (slightly coarse)   Vent Information   Vent type Hobbs C3   Hobbs C3/G5 Vent Mode SPONT   $ Pulse Oximetry Spot Check Charge Completed   SpO2 100 %   SPONT Settings   FIO2 (%) 40 %   PEEP (cmH2O) 6 cmH2O   Pressure Support (cmH2O) 6 cmH20   Flow Trigger (LPM) 5 LPM   P-ramp (ms) 100 ms   ETS  (%) 25 %   Humidification Heater   Heater Temp 95 °F (35 °C)   SPONT Actuals   Resp Rate (BPM) 20 BPM   VT (mL) 328 mL   MV (Obs) 9 8   MAP (cmH2O) 8 9 cmH2O   Peak Pressure (cmH2O) 12 cmH2O   I:E Ratio (Obs) 1:3 1   RSBI (f/vt) 25 f/Vt   Heater Temperature (Obs) 95 °F (35 °C)   SPONT Alarms   High Peak Pressure (cmH20) 50 cmH2O   High Resp Rate (BPM) 40 BPM   High MV (L/min) 20 L/min   Low MV (L/min) 4 L/min   High Spont VTE (mL) 850 mL   Low Spont VTE (mL) 150 mL   Apnea Time (S) 20 S   SPONT Apnea Settings   Resp Rate (BPM) 15 BPM   FIO2 (%) 350 %   %TI (%) 0 8 %   Apnea Time (S) 20 S   Maintenance   Resuscitation bag with peep valve at bedside Yes   Water bag changed No   Circuit changed No   Daily Screen   Patient safety screen outcome: Passed   IHI Ventilator Associated Pneumonia Bundle   Head of Bed Elevated HOB 30   ETT  Cuffed; Inflated; Hi-Lo; Oral 7 5 mm   Placement Date/Time: 08/22/22 1600   Previously placed by?: EMS  Mask Ventilation: Mask ventilation not attempted (0)  Preoxygenated: Yes  Technique: bougie  Type: Cuffed; Inflated; Hi-Lo; Oral  Tube Size: 7 5 mm  Location: Oral  Insertion attempts: 1  P    Secured at (cm) 22   Measured from Teeth   Secured Location Right   Repositioned Right to [address] [place] [address] by [employer] tube reeder   Site Condition Dry   Cuff Pressure (cm H2O) 28 cm H2O   HI-LO Suction  Continuous low suction   HI-LO Secretions Small; Thin  (Brownish)

## 2022-08-24 NOTE — ASSESSMENT & PLAN NOTE
· In the setting of cardiac arrest, CT with concern for early anoxic injury  · 8/22 CTH - Findings suspicious for diffuse cerebral edema concerning for early anoxic encephalopathy       Plan:   · Continue TTM for normothermia  · Frequent neuro exams  · Guarded prognosis- GOL notified

## 2022-08-24 NOTE — RESPIRATORY THERAPY NOTE
RT Ventilator Management Note  Sherren Kitchens 60 y o  female MRN: [id number]  Unit/Bed#: ICU 04-01 Encounter: [id number]      Break-the-Glass       Encounters that might contain data have been deemed confidential and restricted               Daily Screen         8/23/2022  0719 8/24/2022  0307          Patient safety screen outcome[de-identified] Passed Passed (P)                 Physical Exam:   Assessment Type: Assess only  General Appearance: Sedated  Respiratory Pattern: Spontaneous, Assisted  Chest Assessment: Chest expansion symmetrical  Bilateral Breath Sounds: Diminished  Cough: None      Resp Comments: (P) no changes made this shift, pt remains stable on current settings

## 2022-08-24 NOTE — RESPIRATORY THERAPY NOTE
08/24/22 0715   Respiratory Assessment   Assessment Type Assess only   General Appearance Unresponsive  (As per RN Patient on no sedation)   Respiratory Pattern Spontaneous;Assisted   Chest Assessment Chest expansion symmetrical   Bilateral Breath Sounds Diminished; Other (Comment)  (Slightly coarse)   Cough None   Suction ET Tube   Vent Information   Vent ID [id number]   Vent type Gonzalez C3   Gonzalez C3/G5 Vent Mode SPONT   $ Pulse Oximetry Spot Check Charge Completed   SpO2 98 %   SPONT Settings   FIO2 (%) 40 %   PEEP (cmH2O) 6 cmH2O   Pressure Support (cmH2O) 6 cmH20   Flow Trigger (LPM) 5 LPM   P-ramp (ms) 100 ms   ETS  (%) 25 %   Humidification Heater   Heater Temp 95 °F (35 °C)   SPONT Actuals   Resp Rate (BPM) 22 BPM   VT (mL) 362 mL   MV (Obs) 8 9   MAP (cmH2O) 8 4 cmH2O   Peak Pressure (cmH2O) 12 cmH2O   I:E Ratio (Obs) 1:3 5   RSBI (f/vt) 54 f/Vt   Heater Temperature (Obs) 95 °F (35 °C)   SPONT Alarms   High Peak Pressure (cmH20) 50 cmH2O   High Resp Rate (BPM) 5 BPM   High MV (L/min) 20 L/min   Low MV (L/min) 4 L/min   High Spont VTE (mL) 850 mL   Low Spont VTE (mL) 150 mL   Apnea Time (S) 20 S   SPONT Apnea Settings   Resp Rate (BPM) 15 BPM   FIO2 (%) 100 %   %TI (%) 0 8 %   Apnea Time (S) 20 S   Maintenance   Resuscitation bag with peep valve at bedside Yes   Daily Screen   Patient safety screen outcome: Passed   IHI Ventilator Associated Pneumonia Bundle   Head of Bed Elevated HOB 30   ETT  Cuffed; Inflated; Hi-Lo; Oral 7 5 mm   Placement Date/Time: 08/22/22 1600   Previously placed by?: EMS  Mask Ventilation: Mask ventilation not attempted (0)  Preoxygenated: Yes  Technique: bougie  Type: Cuffed; Inflated; Hi-Lo; Oral  Tube Size: 7 5 mm  Location: Oral  Insertion attempts: 1  P       Secured at (cm) 22   Measured from Teeth   Secured Location Right   Repositioned Right to [address] [place] [address] by Commercial tube reeder   Site Condition Dry   Cuff Pressure (cm H2O) 26 cm H2O   HI-LO Suction  Continuous low suction   HI-LO Secretions Small;Thin;Clear; Tan   HI-LO Intervention Patent

## 2022-08-24 NOTE — PROGRESS NOTES
Panda U  65   Progress Note Hoff Shells 1960, 60 y o  female MRN: [id number]  Unit/Bed#: ICU 04-01 Encounter: [id number]  Primary Care Provider: Rafiq Tapia DO   Date and time admitted to hospital: 8/22/2022  1:08 PM    * Cardiac arrest [facility])  Assessment & Plan  · Reported PEA cardiac arrest after patient became unresponsive at nursing home, concern for choking incident and hypoxic respiratory failure as cause for cardiac arrest given large food bolus found on intubation  · Received bystander CPR until EMS arrived, CPR and Epi x3 from EMS, and Epi x2 in ED with eventual ROSC    Plan:  · Pressors weaned to off, restart as needed for goal MAP >65   · Given poor neuro exam, completed 24 hours of TTM at [facility]- now rewarmed to 37C  · Scheduled tylenol with PRN fentanyl for shivering  · Monitor electrolytes and coags closely  · Monitor neuro exam closely     Acute respiratory failure with hypoxia ([facility] [address] )  Assessment & Plan  · Concern for choking incident leading to hypoxic respiratory failure and cardiac arrest, now intubated post arrest for airway protection    Plan:  · Continue PSV and titrate FiO2 for SpO2 >88%  · Continue cefepime and vanco with concern for aspiration   · Awaiting cultures- narrow ABX when able  · Frequent suctioning for pulmonary hygiene, VAP bundle    · Propofol for sedation given respiratory alkalosis and large TVs    Encephalopathy acute  Assessment & Plan  · In the setting of cardiac arrest, CT with concern for early anoxic injury  · 8/22 CTH - Findings suspicious for diffuse cerebral edema concerning for early anoxic encephalopathy       Plan:   · Continue TTM for normothermia  · Frequent neuro exams  · Guarded prognosis- GOL notified    Septic shock ([facility] [address] )  Assessment & Plan  · Present on arrival to ED, tachycardia, hypothermia, lactic acidosis   · Suspect pulmonary source in the setting of aspiration  · 8/22 CT CAP - Nondisplaced fractures right 3rd, 4th, and 5th ribs  Nondisplaced fracture of the sternum without significant retrosternal hematoma   Likely from CPR  Endotracheal tube in good position above alvaro  Small volumes of aspirated material in the bilateral lower lobe branch bronchioles  Scattered scarring and emphysematous changes  No pleural effusion or pneumothorax  Known liver hemangioma   Diffuse periportal edema likely related to fluid resuscitation   Small amounts of gas in the portal triad of the medial segment left hepatic lobe obscured by motion artifact is most likely pneumobilia based on the distribution  No other mesenteric or portal venous gas is found   No bowel wall pneumatosis or other evidence for bowel infarct   Questionable short segment small bowel-small bowel intussusception near the ligament of Treitz, of no clinical relevance  Cholelithiasis without CT evidence of cholecystitis  NG tube tip is at the GE junction and should be advanced into the stomach  Distention of the cecum with fluid and partially formed stool, apparently the result of a palpable core lesion in the ascending colon as detailed above concerning for colonic malignancy   Colonoscopy advised when/if feasible  No complete bowel obstruction or perforation  Right femoral line tip in the right external iliac vein  Mildly distended urinary bladder with Le catheter in place       Plan:  · Continue cefepime and vancomycin  · Follow up on BC, sputum culture, MRSA   · Monitor WBC, fever curve, procal    Abnormal CT of the abdomen  Assessment & Plan  · Narrowing of ascending colon concerning for malignancy  · Will require OP colonoscopy    Sternal fracture  Assessment & Plan  · Pain management and supportive care    Fracture of multiple ribs  Assessment & Plan  · Pain management and supportive care    Hyperglycemia  Assessment & Plan  · Without hx DM, likely in the setting of cardiac arrest  · Insulin infusion  · HgbA1C 5 4    Severe protein-calorie malnutrition (HCC)  Assessment & Plan  Body mass index is 21 77 kg/m²  · Advance TF to goal  · Nutrition consult pending    Schizoaffective disorder ([facility] [address] )  Assessment & Plan  · Hold home medications for now     Chronic obstructive pulmonary disease ([facility] )  Assessment & Plan  · Documented in history, without acute exacerbation  · Not on and inhaled OP regimen  · No hypoxia    Early onset Alzheimer's dementia without behavioral disturbance ([facility] [address] )  Assessment & Plan  · Hold home medications    ----------------------------------------------------------------------------------------  HPI/24hr events: Bradycardia improved  Epi infusion off  Rewarmed to 37C  Propofol off  Patient appropriate for transfer out of the ICU today?: No  Disposition: Continue Critical Care   Code Status: Level 1 - Full Code  ---------------------------------------------------------------------------------------  SUBJECTIVE  n/a    Review of Systems   Unable to perform ROS: Intubated     ---------------------------------------------------------------------------------------  OBJECTIVE    Vitals   Vitals:    22 0000 22 0100 22 0200 22 0300   BP: 167/91 168/85 164/86 (!) 171/87   Pulse: 96 97 96 92   Resp: (!) 24 (!) 25 22 21   Temp: 98 6 °F (37 °C) 99 1 °F (37 3 °C) 99 °F (37 2 °C) 98 8 °F (37 1 °C)   TempSrc: Esophageal  Esophageal    SpO2: 99% 99% 99% 99%   Weight:       Height:         Temp (24hrs), Av 3 °F (36 3 °C), Min:96 4 °F (35 8 °C), Max:99 1 °F (37 3 °C)  Current: Temperature: 98 8 °F (37 1 °C)     Respiratory:  SpO2: SpO2: 99 %, SpO2 Activity: SpO2 Activity: At Rest, SpO2 Device: O2 Device: Ventilator  PSV 6/6 40%    Invasive/non-invasive ventilation settings   Respiratory  Report   Lab Data (Last 4 hours)    None         O2/Vent Data (Last 4 hours)       0307          Patient safety screen outcome: Passed                 Physical Exam  Vitals reviewed  Constitutional:       Appearance: She is cachectic  She is ill-appearing  Interventions: She is sedated and intubated  Eyes:      Pupils:      Right eye: Pupil is not reactive  Left eye: Pupil is not reactive  Cardiovascular:      Rate and Rhythm: Normal rate and regular rhythm  Heart sounds: No murmur heard  No friction rub  Pulmonary:      Effort: Pulmonary effort is normal  She is intubated  Breath sounds: Rhonchi present  No wheezing or rales  Genitourinary:     Comments: +Le  Skin:     General: Skin is warm and dry  Neurological:      Comments: Does not follow commands  Pupils fixed            Laboratory and Diagnostics:  Results from last 7 days   Lab Units 08/24/22  0436 08/23/22  0441 08/22/22  1317   WBC Thousand/uL 23 76* 19 08* 9 34   HEMOGLOBIN g/dL 14 2 12 9 12 4   HEMATOCRIT % 43 8 39 1 43 4   PLATELETS Thousands/uL 250 248 245   BANDS PCT %  --   --  2   MONO PCT %  --   --  5     Results from last 7 days   Lab Units 08/24/22  0436 08/23/22  1724 08/23/22  0441 08/22/22  2149 08/22/22  1621 08/22/22  1317   SODIUM mmol/L 133* 136 139 139 138 137   POTASSIUM mmol/L 3 8 4 3 4 0 2 8* 4 2 4 2   CHLORIDE mmol/L 101 106 110* 108 104 101   CO2 mmol/L 24 19* 17* 19* 18* 15*   ANION GAP mmol/L 8 11 12 12 16* 21*   BUN mg/dL 10 9 11 11 9 8   CREATININE mg/dL 0 56* 0 46* 0 61 0 62 0 83 1 11   CALCIUM mg/dL 8 4 7 9* 8 0* 7 2* 7 5* 9 0   GLUCOSE RANDOM mg/dL 176* 80 113 111 285* 329*   ALT U/L  --   --  44  --   --  20   AST U/L  --   --  76*  --   --  24   ALK PHOS U/L  --   --  76  --   --  64   ALBUMIN g/dL  --   --  3 3*  --   --  3 5   TOTAL BILIRUBIN mg/dL  --   --  0 40  --   --  0 28     Results from last 7 days   Lab Units 08/24/22  0436 08/23/22  1724 08/23/22  0441 08/22/22  1621 08/22/22  1317   MAGNESIUM mg/dL 1 9 1 9 2 1 2 3 2 1   PHOSPHORUS mg/dL 3 7 4 5* 1 3* 4 1 8 7*      Results from last 7 days   Lab Units 08/23/22  0441 08/22/22  1317   INR  1 29* 1 46*   PTT seconds 30  --           Results from last 7 days   Lab Units 08/22/22  8228 08/22/22  1505 08/22/22  1317   LACTIC ACID mmol/L 1 1 6 0* 13 2*     ABG:  Results from last 7 days   Lab Units 08/23/22  1101   PH ART  7 422   PCO2 ART mm Hg 25 5*   PO2 ART mm Hg 195 6*   HCO3 ART mmol/L 16 2*   BASE EXC ART mmol/L -6 5   ABG SOURCE  Line, Arterial     VBG:  Results from last 7 days   Lab Units 08/23/22  1101   ABG SOURCE  Line, Arterial     Results from last 7 days   Lab Units 08/23/22  0441 08/22/22  1317   PROCALCITONIN ng/ml 4 03* <0 05       Micro  Results from last 7 days   Lab Units 08/22/22  1616 08/22/22  1615 08/22/22  1403 08/22/22  1317   BLOOD CULTURE   --   --  No Growth at 24 hrs  No Growth at 24 hrs  GRAM STAIN RESULT  Rare Polys*  1+ Budding yeast*  Rare Gram positive cocci in pairs*  Rare Gram positive rods* No Polys or Bacteria seen  --   --      Intake and Output  I/O       08/22 0701  08/23 0700 08/23 0701  08/24 0700    P  O   30    I V  (mL/kg) 5003 1 (92 6) 1580 5 (29 3)    IV Piggyback 4560 399 3    Total Intake(mL/kg) 9563 1 (177 1) 2009 8 (37 2)    Urine (mL/kg/hr) 3450 1600 (1 2)    Emesis/NG output 300     Stool 0     Total Output 3750 1600    Net +5813 1 +409 8          Unmeasured Stool Occurrence 2 x         Height and Weights   Height: 5' 2" (157 5 cm)     Body mass index is 21 77 kg/m²    Weight (last 2 days)     Date/Time Weight    08/23/22 0730 54 (119)    08/23/22 0532 54 (119 05)    08/23/22 0456 54 (119 05)    08/22/22 1900 51 9 (114 42)        Nutrition       Diet Orders   (From admission, onward)             Start     Ordered    08/23/22 1559  Diet Enteral/Parenteral; Tube Feeding No Oral Diet; Jevity 1 5; Continuous; 20  Diet effective now        Comments: Advance as tolerated   References:    Nutrtion Support Algorithm Enteral vs  Parenteral   Question Answer Comment   Diet Type Enteral/Parenteral    Enteral/Parenteral Tube Feeding No Oral Diet    Tube Feeding Formula: Jevity 1 5    Bolus/Cyclic/Continuous Continuous    Tube Feeding Goal Rate (mL/hr): 20    RD to adjust diet per protocol? Yes        08/23/22 1559                Active Medications  Scheduled Meds:  Current Facility-Administered Medications   Medication Dose Route Frequency Provider Last Rate    acetaminophen  975 mg Oral [facility] Juliana Goode PA-C      cefepime  2,000 mg Intravenous Q12H KENNETH Napier 2,000 mg (08/24/22 0333)    chlorhexidine  15 mL Mouth/Throat Q12H [address] KENNETH Johnson      enoxaparin  30 mg Subcutaneous Q24H [address] Juliana Goode PA-C      fentanyl citrate (PF)  50 mcg Intravenous Q2H PRN KENNETH Napier      hydrALAZINE  10 mg Intravenous Q6H PRN Juliana Goode PA-C      insulin lispro  1-5 Units Subcutaneous Q6H [address] Juliana Goode PA-C      pantoprazole  40 mg Intravenous Q24H [address] KENNETH Johnson      propofol  5-50 mcg/kg/min Intravenous Titrated KENNETH Napier Stopped (08/24/22 0250)    vancomycin  15 mg/kg Intravenous Q12H KENNETH Johnson 750 mg (08/23/22 2255)     Continuous Infusions:  propofol, 5-50 mcg/kg/min, Last Rate: Stopped (08/24/22 0250)      PRN Meds:   fentanyl citrate (PF), 50 mcg, Q2H PRN  hydrALAZINE, 10 mg, Q6H PRN      Invasive Devices Review  Invasive Devices  Report    Central Venous Catheter Line  Duration           CVC Central Lines 08/22/22 Triple 1 day          Peripheral Intravenous Line  Duration           Peripheral IV 08/22/22 Left Antecubital 1 day    Peripheral IV 08/22/22 Right Antecubital 1 day          Drain  Duration           NG/OG/Enteral Tube Orogastric 14 Fr Right mouth 2 days    Urethral Catheter Temperature probe 16 Fr  2 days          Airway  Duration           ETT  Cuffed; Inflated; Hi-Lo; Oral 7 5 mm 1 day              Rationale for remaining devices: n/a  ---------------------------------------------------------------------------------------  Care Time Delivered:   No Critical Care time spent     Jennifer Smith PA-C    Portions of the record may have been created with voice recognition software  Occasional wrong word or "sound a like" substitutions may have occurred due to the inherent limitations of voice recognition software    Read the chart carefully and recognize, using context, where substitutions have occurred

## 2022-08-24 NOTE — PLAN OF CARE
Problem: Prexisting or High Potential for Compromised Skin Integrity  Goal: Skin integrity is maintained or improved  Description: INTERVENTIONS:  - Identify patients at risk for skin breakdown  - Assess and monitor skin integrity  - Assess and monitor nutrition and hydration status  - Monitor labs   - Assess for incontinence   - Turn and reposition patient  - Assist with mobility/ambulation  - Relieve pressure over bony prominences  - Avoid friction and shearing  - Provide appropriate hygiene as needed including keeping skin clean and dry  - Evaluate need for skin moisturizer/barrier cream  - Collaborate with interdisciplinary team   - Patient/family teaching  - Consider wound care consult   Outcome: Progressing     Problem: Potential for Falls  Goal: Patient will remain free of falls  Description: INTERVENTIONS:  - Educate patient/family on patient safety including physical limitations  - Instruct patient to call for assistance with activity   - Consult OT/PT to assist with strengthening/mobility   - Keep Call bell within reach  - Keep bed low and locked with side rails adjusted as appropriate  - Keep care items and personal belongings within reach  - Initiate and maintain comfort rounds  - Make Fall Risk Sign visible to staff  - Offer Toileting every 2 Hours, in advance of need  - Initiate/Maintain bed alarm  - Obtain necessary fall risk management equipment: bed alarm  Problem: PAIN - ADULT  Goal: Verbalizes/displays adequate comfort level or baseline comfort level  Description: Interventions:  - Encourage patient to monitor pain and request assistance  - Assess pain using appropriate pain scale  - Administer analgesics based on type and severity of pain and evaluate response  - Implement non-pharmacological measures as appropriate and evaluate response  - Consider cultural and social influences on pain and pain management  - Notify physician/advanced practitioner if interventions unsuccessful or patient reports new pain  Outcome: Progressing     Problem: INFECTION - ADULT  Goal: Absence of fever/infection during neutropenic period  Description: INTERVENTIONS:  - Monitor WBC    Outcome: Progressing     Problem: SAFETY ADULT  Goal: Patient will remain free of falls  Description: INTERVENTIONS:  - Educate patient/family on patient safety including physical limitations  - Instruct patient to call for assistance with activity   - Consult OT/PT to assist with strengthening/mobility   - Keep Call bell within reach  - Keep bed low and locked with side rails adjusted as appropriate  - Keep care items and personal belongings within reach  - Initiate and maintain comfort rounds  - Make Fall Risk Sign visible to staff  - Offer Toileting every 2 Hours, in advance of need  - Initiate/Maintain bed/chair   Problem: NEUROSENSORY - ADULT  Goal: Achieves stable or improved neurological status  Description: INTERVENTIONS  - Monitor and report changes in neurological status  - Monitor vital signs such as temperature, blood pressure, glucose, and any other labs ordered   - Initiate measures to prevent increased intracranial pressure  - Monitor for seizure activity and implement precautions if appropriate      Outcome: Progressing     Problem: NEUROSENSORY - ADULT  Goal: Remains free of injury related to seizures activity  Description: INTERVENTIONS  - Maintain airway, patient safety  and administer oxygen as ordered  - Monitor patient for seizure activity, document and report duration and description of seizure to physician/advanced practitioner  - If seizure occurs,  ensure patient safety during seizure  - Reorient patient post seizure  - Seizure pads on all 4 side rails  - Instruct patient/family to notify RN of any seizure activity including if an aura is experienced  - Instruct patient/family to call for assistance with activity based on nursing assessment  - Administer anti-seizure medications if ordered    Outcome: Progressing Problem: NEUROSENSORY - ADULT  Goal: Achieves maximal functionality and self care  Description: INTERVENTIONS  - Monitor swallowing and airway patency with patient fatigue and changes in neurological status  - Encourage and assist patient to increase activity and self care     - Encourage visually impaired, hearing impaired and aphasic patients to use assistive/communication devices  Outcome: Progressing     Problem: RESPIRATORY - ADULT  Goal: Achieves optimal ventilation and oxygenation  Description: INTERVENTIONS:  - Assess for changes in respiratory status  - Assess for changes in mentation and behavior  - Position to facilitate oxygenation and minimize respiratory effort  - Oxygen administered by appropriate delivery if ordered  - Initiate smoking cessation education as indicated  - Encourage broncho-pulmonary hygiene including cough, deep breathe, Incentive Spirometry  - Assess the need for suctioning and aspirate as needed  - Assess and instruct to report SOB or any respiratory difficulty  - Respiratory Therapy support as indicated  Outcome: Progressing     Problem: METABOLIC, FLUID AND ELECTROLYTES - ADULT  Goal: Electrolytes maintained within normal limits  Description: INTERVENTIONS:  - Monitor labs and assess patient for signs and symptoms of electrolyte imbalances  - Administer electrolyte replacement as ordered  - Monitor response to electrolyte replacements, including repeat lab results as appropriate  - Instruct patient on fluid and nutrition as appropriate  Outcome: Progressing     Problem: METABOLIC, FLUID AND ELECTROLYTES - ADULT  Goal: Fluid balance maintained  Description: INTERVENTIONS:  - Monitor labs   - Monitor I/O and WT  - Instruct patient on fluid and nutrition as appropriate  - Assess for signs & symptoms of volume excess or deficit  Outcome: Progressing     Problem: METABOLIC, FLUID AND ELECTROLYTES - ADULT  Goal: Glucose maintained within target range  Description: INTERVENTIONS:  - Monitor Blood Glucose as ordered  - Assess for signs and symptoms of hyperglycemia and hypoglycemia  - Administer ordered medications to maintain glucose within target range  - Assess nutritional intake and initiate nutrition service referral as needed  Outcome: Progressing     Problem: SKIN/TISSUE INTEGRITY - ADULT  Goal: Skin Integrity remains intact(Skin Breakdown Prevention)  Description: Assess:  -Perform Jerad assessment every day  -Clean and moisturize skin every shift  -Inspect skin when repositioning, toileting, and assisting with ADLS  -Assess extremities for adequate circulation and sensation     Bed Management:  -Have minimal linens on bed & keep smooth, unwrinkled  -Change linens as needed when moist or perspiring  -Keep HOB at 30 degrees     Toileting:  -Offer bedside commode  -Assess for incontinence every 2 hours  -Use incontinent care products after each incontinent episode     Activity  -Encourage or provide ROM exercises   -Turn and reposition patient every 2 Hours  -Use appropriate equipment to lift or move patient in bed    Skin Care:  -Avoid use of baby powder, tape, friction and shearing, hot water or constrictive clothing  -Relieve pressure over bony prominences   -Do not massage red bony areas  Outcome: Progressing     Problem: SKIN/TISSUE INTEGRITY - ADULT  Goal: Pressure injury heals and does not worsen  Description: Interventions:  - Implement low air loss mattress or specialty surface (Criteria met)  - Apply silicone foam dressing  -Reposition every 2 hours  - Apply fecal or urinary incontinence containment device   - Perform passive or active ROM every 2 hours  - Turn and reposition patient & offload bony prominences every 2 hours   - Utilize friction reducing device or surface for transfers   - Use incontinent care products after each incontinent episode   - Consider nutrition services referral as needed  Outcome: Progressing   alarm  - Obtain necessary fall risk management equipment: bed/chair  - Apply yellow socks and bracelet for high fall risk patients  - Consider moving patient to room near nurses station  Outcome: Progressing     - Apply yellow socks and bracelet for high fall risk patients  - Consider moving patient to room near nurses station  Outcome: Progressing

## 2022-08-24 NOTE — PROGRESS NOTES
Consult for Enteral nutrition recs  Pt is cachexic and malnourished  Visible fat and muscle wasting throughout  There is a question of her hospital wt accuracy however there is no doubt that visiblly she is below IBW  Per MD records, she was 128# on 9/28/2021  Jevity 1 5 is running at 30ml/hr currently  Jevity 1 5 @ 30ml/hr will meet her est needs  Due to inadequate po for sometime, she is a refeeding risk  Post extubation, would suggest consult for anorexia with psychiatry if appropriate r/t mentation, and con't enteral nutrition via peg feedings  1)  EN should remain at jevity 1 5 @ 30 ml/hr w/ 80 ml H2O flush q 6 hrs - do not advance further 2) Recheck P, Mg, K+ tomorrow     3) Con't daily wts    See RD note for additional detail

## 2022-08-24 NOTE — ASSESSMENT & PLAN NOTE
· Reported PEA cardiac arrest after patient became unresponsive at nursing home, concern for choking incident and hypoxic respiratory failure as cause for cardiac arrest given large food bolus found on intubation  · Received bystander CPR until EMS arrived, CPR and Epi x3 from EMS, and Epi x2 in ED with eventual ROSC    Plan:  · Pressors weaned to off, restart as needed for goal MAP >65   · Given poor neuro exam, completed 24 hours of TTM at [facility]- now rewarmed to 37C  · Scheduled tylenol with PRN fentanyl for shivering  · Monitor electrolytes and coags closely  · Monitor neuro exam closely

## 2022-08-24 NOTE — RESPIRATORY THERAPY NOTE
1500 Patient was transported to Cat scan on the MR 1 transport vent with DASH Grullon  Settings of Spont  Mode  , Pressure support of 6 , Peep + 6  And Fio2 40 %  1615 Patient returned to her room and placed back on the C3 Vent with same settings

## 2022-08-24 NOTE — MALNUTRITION/BMI
This medical record reflects one or more clinical indicators suggestive of malnutrition and/or morbid obesity  Malnutrition Findings:   Adult Malnutrition type: Social/enviromental  Adult Degree of Malnutrition: Other severe protein calorie malnutrition  Malnutrition Characteristics: Muscle loss, Fat loss, Inadequate energy, Weight loss                  360 Statement: Pt exhibits s/s of severe malnutrition r/t inadequate calorie per group home as evidenced by generalized fat and muscle wasting throughout  and 22% wt loss x 6 months  Will be treated with EN  BMI Findings:  Adult BMI Classifications: Underweight < 18 5        Body mass index is 13 99 kg/m²  See Nutrition note dated 8/24/2022 for additional details  Completed nutrition assessment is viewable in the nutrition documentation

## 2022-08-25 NOTE — CONSULTS
The patients Vancomycin therapy has been completed / discontinued  Thank you for this consult; Pharmacy will sign-off now      Collette Negro PharmD

## 2022-08-25 NOTE — ASSESSMENT & PLAN NOTE
· Reported PEA cardiac arrest after patient became unresponsive at nursing home, concern for choking incident and hypoxic respiratory failure as cause for cardiac arrest given large food bolus found on intubation  · Received bystander CPR until EMS arrived, CPR and Epi x3 from EMS, and Epi x2 in ED with eventual ROSC    Plan:  · Pressors weaned to off  · Given poor neuro exam, completed 24 hours of TTM at [facility]  · Monitor neuro exam closely   · MRI brain consistent with anoxic injury  · Poor prognosis - sister ultimately decided to transition patient to comfort measures today

## 2022-08-25 NOTE — PROGRESS NOTES
Received call from [facility], informed patient not candidate for cornea donation due to dementia per [facility] staff  Ok to release body to  home  Still no choice of  home by sister  Supervisor aware

## 2022-08-25 NOTE — ASSESSMENT & PLAN NOTE
· Narrowing of ascending colon concerning for malignancy  · OP colonoscopy  · Transitioned to comfort care today

## 2022-08-25 NOTE — DISCHARGE SUMMARY
Panda U  65   Discharge- Deloras Stain 1960, 60 y o  female MRN: [id number]  Unit/Bed#: ICU 04-01 Encounter: [id number]  Primary Care Provider: Janell David DO   Date and time admitted to hospital: 8/22/2022  1:08 PM    * Cardiac arrest [facility])  Assessment & Plan  · Reported PEA cardiac arrest after patient became unresponsive at nursing home, concern for choking incident and hypoxic respiratory failure as cause for cardiac arrest given large food bolus found on intubation  · Received bystander CPR until EMS arrived, CPR and Epi x3 from EMS, and Epi x2 in ED with eventual ROSC    Plan:  · Pressors weaned to off  · Given poor neuro exam, completed 24 hours of TTM at [facility]  · Monitor neuro exam closely   · MRI brain consistent with anoxic injury  · Poor prognosis - sister ultimately decided to transition patient to comfort measures today    Acute respiratory failure with hypoxia ([facility] [address] )  Assessment & Plan  · Concern for choking incident leading to hypoxic respiratory failure and cardiac arrest, now intubated post arrest for airway protection    Plan:  · PSV switched to VC 2/2 tachypnea and low TVs  · Continue cefepime and vanco with concern for aspiration   · Awaiting cultures- D/C vanoc given negative MRSA swab  · Frequent suctioning for pulmonary hygiene, VAP bundle    · Off sedation since 8/23 overnight  · Transitioned to comfort care today    Encephalopathy acute  Assessment & Plan  · In the setting of cardiac arrest, CT with concern for early anoxic injury  · 8/22 CTH - Findings suspicious for diffuse cerebral edema concerning for early anoxic encephalopathy     · 8/24 MRI brain: Extensive cytotoxic edema in the supra tentorial brain involving large portions of the cerebral cortex as well as bilaterally symmetric in the deep gray matter nuclei and patchy signal abnormality/cytotoxic edema in the central alonso, correlating to the provided clinical history of anoxic brain injury and the head CT performed yesterday  ·    Plan:   · Frequent neuro exams  · Guarded prognosis- GOL notified  · Transitioned to comfort care today    Septic shock [facility])  Assessment & Plan  · Present on arrival to ED, tachycardia, hypothermia, lactic acidosis   · Suspect pulmonary source in the setting of aspiration  · 8/22 CT CAP - Nondisplaced fractures right 3rd, 4th, and 5th ribs  Nondisplaced fracture of the sternum without significant retrosternal hematoma   Likely from CPR  Endotracheal tube in good position above alvaro  Small volumes of aspirated material in the bilateral lower lobe branch bronchioles  Scattered scarring and emphysematous changes  No pleural effusion or pneumothorax  Known liver hemangioma   Diffuse periportal edema likely related to fluid resuscitation   Small amounts of gas in the portal triad of the medial segment left hepatic lobe obscured by motion artifact is most likely pneumobilia based on the distribution  No other mesenteric or portal venous gas is found   No bowel wall pneumatosis or other evidence for bowel infarct   Questionable short segment small bowel-small bowel intussusception near the ligament of Treitz, of no clinical relevance  Cholelithiasis without CT evidence of cholecystitis  NG tube tip is at the GE junction and should be advanced into the stomach  Distention of the cecum with fluid and partially formed stool, apparently the result of a palpable core lesion in the ascending colon as detailed above concerning for colonic malignancy   Colonoscopy advised when/if feasible  No complete bowel obstruction or perforation  Right femoral line tip in the right external iliac vein  Mildly distended urinary bladder with Le catheter in place       Plan:  · Continue cefepime and consider d/c vancomycin  · Follow up on cultures  · Monitor WBC and fever curve  · Tylenol PRN for fever and cooling blanket in place  · TTM pads removed for MRI  · Transitioned to comfort care today    Abnormal CT of the abdomen  Assessment & Plan  · Narrowing of ascending colon concerning for malignancy  · OP colonoscopy  · Transitioned to comfort care today    Sternal fracture  Assessment & Plan  · Pain management and supportive care  · Transitioned to comfort care today    Fracture of multiple ribs  Assessment & Plan  · Pain management and supportive care  · Transitioned to comfort care today    Hyperglycemia  Assessment & Plan  · Without hx DM, likely in the setting of cardiac arrest  · SSI  · HgbA1C 5 4  · Transitioned to comfort care today    Severe protein-calorie malnutrition ([facility] [address] )  Assessment & Plan  Body mass index is 20 kg/m²  · TF to goal     Schizoaffective disorder [facility])  Assessment & Plan  · Hold home medications for now     Chronic obstructive pulmonary disease ([facility] )  Assessment & Plan  · Documented in history, without acute exacerbation  · Not on an inhaled OP regimen    Early onset Alzheimer's dementia without behavioral disturbance [facility])  Assessment & Plan  · Hold home medications        Medical Problems             Resolved Problems  Date Reviewed: 8/25/2022          Resolved    JYOTI (acute kidney injury) ([place] [id number] ) 8/23/2022     Resolved by  Osiel Machado PA-C    Lactic acidosis 8/23/2022     Resolved by  Osiel Machado PA-C                Admission Date:   Admission Orders (From admission, onward)     Ordered        08/22/22 1420  Inpatient Admission  Once                        Admitting Diagnosis: Cardiac arrest ([facility] ) [I46 9]  Choking, initial encounter [P01 689B]  Septic shock ([facility] ) [A41 9, R65 21]    HPI:  As per KENNETH Peraza --     "Destin Cee is a 60 y o  female who presents with PEA cardiac arrest  She has PMH dementia, schizophrenia, and COPD  Per chart review, patient was eating lunch at her assisted living facility and began to feel unwell, she then became unresponsive and CPR was started by staff   EMS arrived and she received Epi x3 and was intubated in the field  She was brought to the ED where she received Epi x2 with ROSC  She was immediately started on levophed and vasopressin for hypotension  Post arrest, she remained unresponsive to painful stimuli with no cough/gag reflexes  She will be admitted to ICU for for further monitoring and management "    Procedures Performed:   Orders Placed This Encounter   Procedures   [facility] ED ECG Documentation Only    Intubation       Summary of Hospital Course: Patient initially admitted as noted above post PEA arrest  Bronchoscopy was performed and ETT was exchanged  TTM was initiated per protocol at [facility] x24 hours  During this time, patient was noted to have episodes of bradycardia, which resolved with Epinephrine gtt  Normothermia was achieved at 1400 on 08/23  After achieving normothermia, bradycardia noted to resolve and Epinephrine gtt was able to be weaned off  Given alkalosis and increased RR, patient was started on Propofol gtt for sedation  Unfortunately despite TTM and pausing of sedation, patient remained unresponsive and without gag/cough/corneal reflexes, however was noted to be overbreathing ventilator  Brain MRI was completed and consistent with severe anoxic brain injury  Unfortunately given imaging results and clinical exam, patient with overall grim prognosis and likely no meaningful recovery  Today patient's sister and primary contact, Arlyn, ultimately decided to transition patient to comfort care  Patient was terminally extubated and passed comfortably at 1128 this AM      Significant Findings, Care, Treatment and Services Provided:     08/22 ETT in field    08/22 CXR: No acute disease  NG tube at [facility]  Recommend advancing  08/22 Right Femoral TLC placed    08/22 CTH: Findings suspicious for diffuse cerebral edema concerning for early anoxic encephalopathy  08/22 CT C/A/P:   Nondisplaced fractures right 3rd, 4th, and 5th ribs    Nondisplaced fracture of the sternum without significant retrosternal hematoma  Likely from CPR  Endotracheal tube in good position above alvaro      Small volumes of aspirated material in the bilateral lower lobe branch bronchioles      Scattered scarring and emphysematous changes      No pleural effusion or pneumothorax      Known liver hemangioma  Diffuse periportal edema likely related to fluid resuscitation  Small amounts of gas in the portal triad of the medial segment left hepatic lobe obscured by motion artifact is most likely pneumobilia based on the distribution  No other mesenteric or portal venous gas is found  No bowel wall pneumatosis or other evidence for bowel infarct  Questionable short segment small bowel-small bowel intussusception near the ligament of Treitz, of no clinical relevance      Cholelithiasis without CT evidence of cholecystitis      NG tube tip is at the GE junction and should be advanced into the stomach      Distention of the cecum with fluid and partially formed stool, apparently the result of a palpable core lesion in the ascending colon as detailed above concerning for colonic malignancy  Colonoscopy advised when/if feasible  No complete bowel obstruction or perforation      Right femoral line tip in the right external iliac vein      Mildly distended urinary bladder with Le catheter in place      08/22 Bronchoscopy:  FINDINGS:  · Bronchoalveolar lavage was performed x3 in the RML with 50 mL of saline instilled and a total return of 60 mL; the fluid appeared cloudy and serosanguinous  · Moderate, thick and purulent secretions present in the left upper lobar bronchus, lingular lobar bronchus, left lower lobar bronchus, right middle lobar bronchus, right lower lobar bronchus, right superior segment (RB6), right medial basal segment (RB7), right anterior basal segment (RB8), right lateral basal segment (RB9) and right posterior basal segment (RB10); performed washing with a total return of 25 mL, sample sent for cytology and microbiology analysis; secretions were easily removed and the airway was cleared  · Moderate erythematous and friable mucosa in the main alvaro and right lung  · Noted very small fine materials at the dependent/lower lobes and right soup segment suction without significant difficulty         TTE:    Left Ventricle: Left ventricular cavity size is normal  Wall thickness is normal  The left ventricular ejection fraction is 50%  Systolic function is low normal  Wall motion is normal     Right Ventricle: Right ventricular cavity size is mildly dilated    Mitral Valve: There is moderate regurgitation   Brain MRI: Extensive cytotoxic edema in the supra tentorial brain involving large portions of the cerebral cortex as well as bilaterally symmetric in the deep gray matter nuclei and patchy signal abnormality/cytotoxic edema in the central alonso, correlating to the provided clinical history of anoxic brain injury and the head CT performed yesterday  Complications: N/A    Condition at Time of Death:     Final Diagnosis: PEA arrest resulting in anoxic brain injury    Date, Time and Cause of Death    Date of Death: 22  Time of Death: 11:28 AM  Preliminary Cause of Death: Cardiac arrest with pulseless electrical activity ([facility] [address] )  Entered by: KENNETH Salmon[BA1 1]     Attribution     BA1  [address] Eben Keto 22 11:33          PCP: Herve Barboza DO    Disposition:

## 2022-08-25 NOTE — ASSESSMENT & PLAN NOTE
· Concern for choking incident leading to hypoxic respiratory failure and cardiac arrest, now intubated post arrest for airway protection    Plan:  · PSV switched to VC 2/2 tachypnea and low TVs  · Continue cefepime and vanco with concern for aspiration   · Awaiting cultures- D/C vanoc given negative MRSA swab  · Frequent suctioning for pulmonary hygiene, VAP bundle    · Off sedation since 8/23 overnight  · Transitioned to comfort care today

## 2022-08-25 NOTE — NURSING NOTE
Patient is being transitioned to comfort care  Will medicate for air hunger at this time  Patient is going to be extubated after discussion with patients sister by the critical care AP

## 2022-08-25 NOTE — DEATH NOTE
INPATIENT DEATH NOTE  Dajuan Blackwood 60 y o  female MRN: [id number]  Unit/Bed#: ICU  Encounter: [id number]    Date, Time and Cause of Death    Date of Death: 22  Time of Death: 11:28 AM  Preliminary Cause of Death: Cardiac arrest with pulseless electrical activity ([facility] [address] )  Entered by: KENNETH Zepeda[BA1 1]     Attribution     BA1  [address] 22 11:33           Patient's Information  Pronounced by: KENNETH Zepeda  Did the patient's death occur in the ED?: No  Did the patient's death occur in the OR?: No  Did the patient's death occur less than 10 days post-op?: No  Did the patient's death occur within 24 hours of admission?: No  Was code status DNR at the time of death?: Yes    PHYSICAL EXAM:  Unresponsive to noxious stimuli, Spontaneous respirations absent, Breath sounds absent, Carotid pulse absent, Heart sounds absent, Pupillary light reflex absent and Corneal blink reflex absent    Medical Examiner notification criteria:  NONE APPLICABLE   Medical Examiner's office notified?:  No, does not meet ME notification criteria   Medical Examiner accepted case?:  No  Name of Medical Examiner: N/A    Family Notification  Was the family notified?: Yes  Date Notified: 22  Time Notified: 46  Notified by: Donnie Santiago  Name of Family Notified of Death: Viviana Swift   Relationship to Patient: Sister  Family Notification Route: Telephone  Was the family told to contact a  home?: Yes  Name of  Home[de-identified] Unknown at this time    Autopsy Options:  Post-mortem examination declined by next of kin    Primary Service Attending Physician notified?:  yes - Attending: Claudeen Lan, MD    Physician/Resident responsible for completing Discharge Summary:  Mike Olesn

## 2022-08-25 NOTE — PROGRESS NOTES
Critical Care Advanced Care Planning Note  Dajuan Blackwood 60 y o  female MRN: [id number]  Unit/Bed#: ICU 04-01 Encounter: [id number]    Dajuan Blackwood is a 60 y o  female requiring critical care evaluation and advanced care planning  The patient has chronic comorbidities, including but not limited to HLD, COPD, SHEREEN, malnutrition, GERD, vascular dementia, early onset Alzheimer's dementia, tobacco abuse, anxiety/depression, schizoaffective disorder, prior suicide attempt/ideation, which is now further complicated by the following acute conditions: post PEA arrest 2/2 ? aspiration/hypoxia/vasovagal event, anoxic brain injury, acute respiratory failure, septic shock, lactic acidosis, acute encephalopathy, mild JYOTI, aspiration pneumonia/pneumonitis, sternal/rib fractures, hyperglycemia, severe protein-calorie malnutrition  Due to the severity of the patient's acute condition and/or the extent of chronic conditions, additional conversations pertaining to advanced care planning were required  Today's discussion, which was held over the phone, included sister, Eduardo Vickers, and it was established that all stake holders understood the rationale for the advanced care planning  The patient was unable to participate in the discussion due to post PEA arrest, acute encephalopathy, acute respiratory failure requiring intubation, anoxic brain injury  Summary of Discussion:  I updated patient's sister and primary contact, Eduardo Vickers, via telephone regarding patient's overall clinical status and grim prognosis  We discussed the results of patient's Brain MRI, which were consistent with anoxic brain injury  We also discussed patient's clinical exam revealing (+) respiratory drive, however continued unresponsive state and (-) gag/cough/corneal reflexes  We discussed that it has now been greater than 72 hours since her arrest and that she has been off of sedation for over 24 hours at this point   Unfortunately given MRI results and her clinical exam, there does not appear to be nor will there likely be any meaningful recovery at this point despite our best efforts  Patient's sister states that her sister would not want to be in a vegetative state as she is now and would like to transition patient to comfort care  We discussed that this would mean patient will be terminally extubated and will ultimately result in patient's death, however patient will be kept comfortable with medications as necessary until that point  Celina Sessions understanding of this and would not like to be present when patient is terminally extubated  Dayana Matter has requested that we call her when patient has passed  Total time spent, (25) minutes (2870 to 4833)        CODE STATUS: COMFORT CARE - Level 4  POA:    POLST:        SIGNATURE: KENNETH Salmon  DATE: August 25, 2022  TIME: 10:45 AM

## 2022-08-25 NOTE — NURSING NOTE
Transported to [facility] only belongings sent with patient was pair of black shoes  Security aware patient is eligible for cornea donation and we don't have the name of a  yet  Waiting a return call from sister

## 2022-08-25 NOTE — NURSING NOTE
"[place]" from [address] [facility] called confirmed patient is eligible for cornea donation only and they will be reaching out to family

## 2022-08-25 NOTE — ASSESSMENT & PLAN NOTE
· Present on arrival to ED, tachycardia, hypothermia, lactic acidosis   · Suspect pulmonary source in the setting of aspiration  · 8/22 CT CAP - Nondisplaced fractures right 3rd, 4th, and 5th ribs  Nondisplaced fracture of the sternum without significant retrosternal hematoma   Likely from CPR  Endotracheal tube in good position above alvaro  Small volumes of aspirated material in the bilateral lower lobe branch bronchioles  Scattered scarring and emphysematous changes  No pleural effusion or pneumothorax  Known liver hemangioma   Diffuse periportal edema likely related to fluid resuscitation   Small amounts of gas in the portal triad of the medial segment left hepatic lobe obscured by motion artifact is most likely pneumobilia based on the distribution  No other mesenteric or portal venous gas is found   No bowel wall pneumatosis or other evidence for bowel infarct   Questionable short segment small bowel-small bowel intussusception near the ligament of Treitz, of no clinical relevance  Cholelithiasis without CT evidence of cholecystitis  NG tube tip is at the GE junction and should be advanced into the stomach  Distention of the cecum with fluid and partially formed stool, apparently the result of a palpable core lesion in the ascending colon as detailed above concerning for colonic malignancy   Colonoscopy advised when/if feasible  No complete bowel obstruction or perforation  Right femoral line tip in the right external iliac vein  Mildly distended urinary bladder with Le catheter in place       Plan:  · Continue cefepime and consider d/c vancomycin  · Follow up on cultures  · Monitor WBC and fever curve  · Tylenol PRN for fever and cooling blanket in place  · TTM pads removed for MRI  · Transitioned to comfort care today

## 2022-08-25 NOTE — ASSESSMENT & PLAN NOTE
· Reported PEA cardiac arrest after patient became unresponsive at nursing home, concern for choking incident and hypoxic respiratory failure as cause for cardiac arrest given large food bolus found on intubation  · Received bystander CPR until EMS arrived, CPR and Epi x3 from EMS, and Epi x2 in ED with eventual ROSC    Plan:  · Pressors weaned to off  · Given poor neuro exam, completed 24 hours of TTM at [facility]  · Monitor neuro exam closely   · MRI brain consistent with anoxic injury  · Poor prognosis- need family meeting today to discuss goals of care

## 2022-08-25 NOTE — ASSESSMENT & PLAN NOTE
· Without hx DM, likely in the setting of cardiac arrest  · SSI  · HgbA1C 5 4  · Transitioned to comfort care today

## 2022-08-25 NOTE — ASSESSMENT & PLAN NOTE
· Present on arrival to ED, tachycardia, hypothermia, lactic acidosis   · Suspect pulmonary source in the setting of aspiration  · 8/22 CT CAP - Nondisplaced fractures right 3rd, 4th, and 5th ribs  Nondisplaced fracture of the sternum without significant retrosternal hematoma   Likely from CPR  Endotracheal tube in good position above alvaro  Small volumes of aspirated material in the bilateral lower lobe branch bronchioles  Scattered scarring and emphysematous changes  No pleural effusion or pneumothorax  Known liver hemangioma   Diffuse periportal edema likely related to fluid resuscitation   Small amounts of gas in the portal triad of the medial segment left hepatic lobe obscured by motion artifact is most likely pneumobilia based on the distribution  No other mesenteric or portal venous gas is found   No bowel wall pneumatosis or other evidence for bowel infarct   Questionable short segment small bowel-small bowel intussusception near the ligament of Treitz, of no clinical relevance  Cholelithiasis without CT evidence of cholecystitis  NG tube tip is at the GE junction and should be advanced into the stomach  Distention of the cecum with fluid and partially formed stool, apparently the result of a palpable core lesion in the ascending colon as detailed above concerning for colonic malignancy   Colonoscopy advised when/if feasible  No complete bowel obstruction or perforation  Right femoral line tip in the right external iliac vein  Mildly distended urinary bladder with Le catheter in place       Plan:  · Continue cefepime and consider d/c vancomycin  · Follow up on cultures  · Monitor WBC and fever curve  · Tylenol PRN for fever and cooling blanket in place  · TTM pads removed for MRI

## 2022-08-25 NOTE — RESPIRATORY THERAPY NOTE
08/25/22 1104   Respiratory Assessment   Resp Comments Pt exubated to room air for comfort measures at this time

## 2022-08-25 NOTE — RESPIRATORY THERAPY NOTE
RT Ventilator Management Note  Josie Lala 60 y o  female MRN: [id number]  Unit/Bed#: ICU 04-01 Encounter: [id number]      Break-the-Glass       Encounters that might contain data have been deemed confidential and restricted  Daily Screen         8/24/2022  0715 8/25/2022  0758          Patient safety screen outcome[de-identified] Passed --      Not Ready for Weaning due to[de-identified] -- Underline problem not resolved                Physical Exam:   Assessment Type: Assess only  General Appearance: Unresponsive  Respiratory Pattern: Assisted  Chest Assessment: Chest expansion symmetrical  Suction: (P) ET Tube  O2 Device: vent      Resp Comments: (P) Pt unresponsive on vent  Plan is for a family meeting today about plan of care  No weaning performed

## 2022-08-25 NOTE — RESPIRATORY THERAPY NOTE
08/25/22 0151   Respiratory Assessment   Resp Comments   (pt  taken of cpap, and placed on cmv 20/350/40% peep 6 due increased rr, and decreased tidal volumes  PAC at bedside   will continue to monitor pt)   O2 Device vent   Vent Information   Vent ID [id number]   Vent type Gonzalez C3   Gonzalez C3/G5 Vent Mode (S)  (S)CMV   $ Vent Daily Charge-Subsequent Yes   $ Pulse Oximetry Spot Check Charge Completed   (S)CMV Settings   Resp Rate (BPM) 20 BPM   VT (mL) 350 mL   FIO2 (%) 40 %   PEEP (cmH2O) 6 cmH2O   I:E Ratio 1:4   Insp Time (%) 0 6 %   Flow Trigger (LPM) 5   Humidification Heater   Heater Temperature (Set) 95 °F (35 °C)   (S)CMV Actuals   Resp Rate (BPM) 25 BPM   VT (mL) 620   MV 8 8   MAP (cmH2O) 9 1 cmH2O   Peak Pressure (cmH2O) 25 cmH2O   I:E Ratio (Obs) 1:4   Insp Resistance 13   Heater Temperature (Obs) 95 °F (35 °C)   Static Compliance (mL/cmH20) 11 9 mL/cmH2O   (S)CMV Alarms   High Peak Pressure (cmH2O) 50   Low Pressure (cmH2O) 5 cm H2O   High Resp Rate (BPM) 40 BPM   Low Resp Rate (BPM) 4 BPM   High MV (L/min) 16 L/min   Low MV (L/min) 4 L/min   High VT (mL) 850 mL   Low VT (mL) 250 mL   Leak (%) 0 %   Apnea Time (s) 20 S   Maintenance   Alarm (pink) cable attached No   Resuscitation bag with peep valve at bedside Yes   Water bag changed No   Circuit changed No

## 2022-08-25 NOTE — PROGRESS NOTES
[address]  Progress Note Ingris Johnson 1960, 60 y o  female MRN: [id number]  Unit/Bed#: ICU 04-01 Encounter: [id number]  Primary Care Provider: Janell David DO   Date and time admitted to hospital: 8/22/2022  1:08 PM    * Cardiac arrest [facility])  Assessment & Plan  · Reported PEA cardiac arrest after patient became unresponsive at nursing home, concern for choking incident and hypoxic respiratory failure as cause for cardiac arrest given large food bolus found on intubation  · Received bystander CPR until EMS arrived, CPR and Epi x3 from EMS, and Epi x2 in ED with eventual ROSC    Plan:  · Pressors weaned to off  · Given poor neuro exam, completed 24 hours of TTM at [facility]  · Monitor neuro exam closely   · MRI brain consistent with anoxic injury  · Poor prognosis- need family meeting today to discuss goals of care    Acute respiratory failure with hypoxia ([facility] [address] )  Assessment & Plan  · Concern for choking incident leading to hypoxic respiratory failure and cardiac arrest, now intubated post arrest for airway protection    Plan:  · PSV switched to VC 2/2 tachypnea and low TVs  · Continue cefepime and vanco with concern for aspiration   · Awaiting cultures- D/C vanoc given negative MRSA swab  · Frequent suctioning for pulmonary hygiene, VAP bundle    · Off sedation since 8/23 overnight    Encephalopathy acute  Assessment & Plan  · In the setting of cardiac arrest, CT with concern for early anoxic injury  · 8/22 CTH - Findings suspicious for diffuse cerebral edema concerning for early anoxic encephalopathy  · 8/24 MRI brain: Extensive cytotoxic edema in the supra tentorial brain involving large portions of the cerebral cortex as well as bilaterally symmetric in the deep gray matter nuclei and patchy signal abnormality/cytotoxic edema in the central alonso, correlating to the provided clinical history of anoxic brain injury and the head CT performed yesterday    ·    Plan:   · Frequent neuro exams  · Guarded prognosis- GOL notified  · Family meeting planned today    Septic shock [facility])  Assessment & Plan  · Present on arrival to ED, tachycardia, hypothermia, lactic acidosis   · Suspect pulmonary source in the setting of aspiration  · 8/22 CT CAP - Nondisplaced fractures right 3rd, 4th, and 5th ribs  Nondisplaced fracture of the sternum without significant retrosternal hematoma   Likely from CPR  Endotracheal tube in good position above alvaro  Small volumes of aspirated material in the bilateral lower lobe branch bronchioles  Scattered scarring and emphysematous changes  No pleural effusion or pneumothorax  Known liver hemangioma   Diffuse periportal edema likely related to fluid resuscitation   Small amounts of gas in the portal triad of the medial segment left hepatic lobe obscured by motion artifact is most likely pneumobilia based on the distribution  No other mesenteric or portal venous gas is found   No bowel wall pneumatosis or other evidence for bowel infarct   Questionable short segment small bowel-small bowel intussusception near the ligament of Treitz, of no clinical relevance  Cholelithiasis without CT evidence of cholecystitis  NG tube tip is at the GE junction and should be advanced into the stomach  Distention of the cecum with fluid and partially formed stool, apparently the result of a palpable core lesion in the ascending colon as detailed above concerning for colonic malignancy   Colonoscopy advised when/if feasible  No complete bowel obstruction or perforation  Right femoral line tip in the right external iliac vein  Mildly distended urinary bladder with Le catheter in place       Plan:  · Continue cefepime and consider d/c vancomycin  · Follow up on cultures  · Monitor WBC and fever curve  · Tylenol PRN for fever and cooling blanket in place  · TTM pads removed for MRI    Abnormal CT of the abdomen  Assessment & Plan  · Narrowing of ascending colon concerning for malignancy  · OP colonoscopy    Sternal fracture  Assessment & Plan  · Pain management and supportive care    Fracture of multiple ribs  Assessment & Plan  · Pain management and supportive care    Hyperglycemia  Assessment & Plan  · Without hx DM, likely in the setting of cardiac arrest  · SSI  · HgbA1C 5 4    Severe protein-calorie malnutrition (HCC)  Assessment & Plan  Body mass index is 13 99 kg/m²  · TF to goal     Schizoaffective disorder ([facility] [address] )  Assessment & Plan  · Hold home medications for now     Chronic obstructive pulmonary disease ([facility] [address] )  Assessment & Plan  · Documented in history, without acute exacerbation  · Not on and inhaled OP regimen  · No hypoxia    Early onset Alzheimer's dementia without behavioral disturbance ([facility] [address] )  Assessment & Plan  · Hold home medications    ----------------------------------------------------------------------------------------  HPI/24hr events: Remains of propofol with GCS 3T  No cough or gag  +sponteanous respirations       Patient appropriate for transfer out of the ICU today?: No  Disposition: Continue Critical Care   Code Status: Level 1 - Full Code  ---------------------------------------------------------------------------------------  SUBJECTIVE  n/a    Review of Systems   Unable to perform ROS: Intubated     ---------------------------------------------------------------------------------------  OBJECTIVE    Vitals   Vitals:    22 0100 22 0300 22 0400 22 0501   BP: 143/76 161/83 146/76    Pulse: 76 80 89    Resp: (!) 42 (!) 34 (!) 29    Temp: (!) 96 98 °F (36 1 °C) 98 6 °F (37 °C) 99 32 °F (37 4 °C) 100 2 °F (37 9 °C)   TempSrc:    Esophageal   SpO2: 96% 96% 96%    Weight:       Height:         Temp (24hrs), Av 1 °F (37 8 °C), Min:96 98 °F (36 1 °C), Max:104 7 °F (40 4 °C)  Current: Temperature: 100 2 °F (37 9 °C)  Arterial Line BP: 126/112  Arterial Line MAP (mmHg): 118 mmHg    Respiratory:  SpO2: SpO2: 96 %, SpO2 Activity: SpO2 Activity: At Rest, SpO2 Device: O2 Device: Ventilator  PSV: 6/6 40%    Physical Exam  Vitals reviewed  Constitutional:       Appearance: She is cachectic  Interventions: She is intubated  Cardiovascular:      Rate and Rhythm: Normal rate and regular rhythm  Heart sounds: No murmur heard  No friction rub  Pulmonary:      Effort: Pulmonary effort is normal  She is intubated  Breath sounds: No wheezing, rhonchi or rales  Comments: Coarse breath sounds  Genitourinary:     Comments: +Le  Skin:     General: Skin is warm and dry  Neurological:      Comments: Off sedation >24 hours, does not move any extremities  No purposeful movement  No cough or gag  Psychiatric:      Comments: Cannot assess            Laboratory and Diagnostics:  Results from last 7 days   Lab Units 08/24/22 0436 08/23/22 0441 08/22/22  1317   WBC Thousand/uL 23 76* 19 08* 9 34   HEMOGLOBIN g/dL 14 2 12 9 12 4   HEMATOCRIT % 43 8 39 1 43 4   PLATELETS Thousands/uL 250 248 245   BANDS PCT %  --   --  2   MONO PCT %  --   --  5     Results from last 7 days   Lab Units 08/24/22 0436 08/23/22  1724 08/23/22 0441 08/22/22  2149 08/22/22  1621 08/22/22  1317   SODIUM mmol/L 133* 136 139 139 138 137   POTASSIUM mmol/L 3 8 4 3 4 0 2 8* 4 2 4 2   CHLORIDE mmol/L 101 106 110* 108 104 101   CO2 mmol/L 24 19* 17* 19* 18* 15*   ANION GAP mmol/L 8 11 12 12 16* 21*   BUN mg/dL 10 9 11 11 9 8   CREATININE mg/dL 0 56* 0 46* 0 61 0 62 0 83 1 11   CALCIUM mg/dL 8 4 7 9* 8 0* 7 2* 7 5* 9 0   GLUCOSE RANDOM mg/dL 176* 80 113 111 285* 329*   ALT U/L  --   --  44  --   --  20   AST U/L  --   --  76*  --   --  24   ALK PHOS U/L  --   --  76  --   --  64   ALBUMIN g/dL  --   --  3 3*  --   --  3 5   TOTAL BILIRUBIN mg/dL  --   --  0 40  --   --  0 28     Results from last 7 days   Lab Units 08/24/22 0436 08/23/22  1724 08/23/22  0441 08/22/22  1621 08/22/22  1317   MAGNESIUM mg/dL 1 9 1 9 2 1 2 3 2 1   PHOSPHORUS mg/dL 3 7 4 5* 1 3* 4 1 8 7*      Results from last 7 days   Lab Units 08/23/22  0441 08/22/22  1317   INR  1 29* 1 46*   PTT seconds 30  --           Results from last 7 days   Lab Units 08/22/22  2149 08/22/22  1505 08/22/22  1317   LACTIC ACID mmol/L 1 1 6 0* 13 2*     ABG:  Results from last 7 days   Lab Units 08/23/22  1101   PH ART  7 422   PCO2 ART mm Hg 25 5*   PO2 ART mm Hg 195 6*   HCO3 ART mmol/L 16 2*   BASE EXC ART mmol/L -6 5   ABG SOURCE  Line, Arterial     VBG:  Results from last 7 days   Lab Units 08/23/22  1101   ABG SOURCE  Line, Arterial     Results from last 7 days   Lab Units 08/23/22  0441 08/22/22  1317   PROCALCITONIN ng/ml 4 03* <0 05       Micro  Results from last 7 days   Lab Units 08/22/22  1616 08/22/22  1615 08/22/22  1505 08/22/22  1403 08/22/22  1317   BLOOD CULTURE   --   --   --  No Growth at 48 hrs  No Growth at 48 hrs  GRAM STAIN RESULT  Rare Polys*  1+ Budding yeast*  Rare Gram positive cocci in pairs*  Rare Gram positive rods* No Polys or Bacteria seen  --   --   --    MRSA CULTURE ONLY   --   --  No Methicillin Resistant Staphlyococcus aureus (MRSA) isolated  --   --      Intake and Output  I/O       08/23 0701  08/24 0700 08/24 0701  08/25 0700    P  O  30 0    I V  (mL/kg) 1643 6 (47 4)     NG/GT 50 0    IV Piggyback 599 3 300    Feedings 253 320    Total Intake(mL/kg) 2575 9 (74 2) 620 (17 9)    Urine (mL/kg/hr) 2550 (3 1) 1795 (2 2)    Total Output 2550 1795    Net +25 9 -1175              Height and Weights   Height: 5' 2" (157 5 cm)     Body mass index is 13 99 kg/m²    Weight (last 2 days)     Date/Time Weight    08/24/22 0553 34 7 (76 5)     Weight: weighed 3x at 08/24/22 0553    08/23/22 0730 54 (119)    08/23/22 0532 54 (119 05)    08/23/22 0456 54 (119 05)        Nutrition       Diet Orders   (From admission, onward)             Start     Ordered    08/24/22 2815  Diet Enteral/Parenteral; Tube Feeding No Oral Diet; Jevity 1 5; Continuous; 30; 80; Water; Every 6 hours  Diet effective now        Comments: Advance as tolerated   References:    Nutrtion Support Algorithm Enteral vs  Parenteral   Question Answer Comment   Diet Type Enteral/Parenteral    Enteral/Parenteral Tube Feeding No Oral Diet    Tube Feeding Formula: Jevity 1 5    Bolus/Cyclic/Continuous Continuous    Tube Feeding Goal Rate (mL/hr): 30    Tube Feeding flush (mL): 80    Water Flush type: Water    Water flush frequency: Every 6 hours    RD to adjust diet per protocol? Yes        08/24/22 5428              Active Medications  Scheduled Meds:  Current Facility-Administered Medications   Medication Dose Route Frequency Provider Last Rate    acetaminophen  975 mg Oral Q6H PRN Jennyfer Gupta PA-C      cefepime  2,000 mg Intravenous Q12H [facility] mg (08/25/22 0247)    chlorhexidine  15 mL Mouth/Throat Q12H [facility] KENNETH Johnson      enoxaparin  30 mg Subcutaneous Q24H [facility] Juliana Goode PA-C      insulin lispro  1-5 Units Subcutaneous Q6H [facility] Juliana Goode PA-C      labetalol  10 mg Intravenous Q6H PRN Abi Limon PA-C      pantoprazole  40 mg Intravenous Q24H [facility] KENNETH Barlow      vancomycin  15 mg/kg Intravenous Q12H [facility] 750 mg (08/24/22 2335)     Continuous Infusions:     PRN Meds:   acetaminophen, 975 mg, Q6H PRN  labetalol, 10 mg, Q6H PRN      Invasive Devices Review  Invasive Devices  Report    Peripheral Intravenous Line  Duration           Peripheral IV 08/22/22 Left Antecubital 2 days    Peripheral IV 08/22/22 Right Antecubital 2 days          Drain  Duration           NG/OG/Enteral Tube Orogastric 14 Fr Right mouth 3 days    Urethral Catheter Temperature probe 16 Fr  3 days          Airway  Duration           ETT  Cuffed; Inflated; Hi-Lo; Oral 7 5 mm 2 days              Rationale for remaining devices: n/a  ---------------------------------------------------------------------------------------  Care Time Delivered:   No Critical Care time spent     Jennyfer Gupta PA-C    Portions of the record may have been created with voice recognition software  Occasional wrong word or "sound a like" substitutions may have occurred due to the inherent limitations of voice recognition software    Read the chart carefully and recognize, using context, where substitutions have occurred

## 2022-08-25 NOTE — ASSESSMENT & PLAN NOTE
· Concern for choking incident leading to hypoxic respiratory failure and cardiac arrest, now intubated post arrest for airway protection    Plan:  · PSV switched to VC 2/2 tachypnea and low TVs  · Continue cefepime and vanco with concern for aspiration   · Awaiting cultures- D/C vanoc given negative MRSA swab  · Frequent suctioning for pulmonary hygiene, VAP bundle    · Off sedation since 8/23 overnight

## 2022-08-25 NOTE — ASSESSMENT & PLAN NOTE
· In the setting of cardiac arrest, CT with concern for early anoxic injury  · 8/22 CTH - Findings suspicious for diffuse cerebral edema concerning for early anoxic encephalopathy  · 8/24 MRI brain: Extensive cytotoxic edema in the supra tentorial brain involving large portions of the cerebral cortex as well as bilaterally symmetric in the deep gray matter nuclei and patchy signal abnormality/cytotoxic edema in the central alonso, correlating to the provided clinical history of anoxic brain injury and the head CT performed yesterday    ·    Plan:   · Frequent neuro exams  · Guarded prognosis- GOL notified  · Family meeting planned today

## 2022-08-25 NOTE — ASSESSMENT & PLAN NOTE
· In the setting of cardiac arrest, CT with concern for early anoxic injury  · 8/22 CTH - Findings suspicious for diffuse cerebral edema concerning for early anoxic encephalopathy  · 8/24 MRI brain: Extensive cytotoxic edema in the supra tentorial brain involving large portions of the cerebral cortex as well as bilaterally symmetric in the deep gray matter nuclei and patchy signal abnormality/cytotoxic edema in the central alonso, correlating to the provided clinical history of anoxic brain injury and the head CT performed yesterday    ·    Plan:   · Frequent neuro exams  · Guarded prognosis- GOL notified  · Transitioned to comfort care today

## 2022-08-27 LAB
BACTERIA BLD CULT: NORMAL
BACTERIA BLD CULT: NORMAL